# Patient Record
Sex: FEMALE | Race: WHITE | Employment: FULL TIME | URBAN - METROPOLITAN AREA
[De-identification: names, ages, dates, MRNs, and addresses within clinical notes are randomized per-mention and may not be internally consistent; named-entity substitution may affect disease eponyms.]

---

## 2017-03-01 ENCOUNTER — OFFICE VISIT (OUTPATIENT)
Dept: INTERNAL MEDICINE | Facility: CLINIC | Age: 62
End: 2017-03-01
Payer: COMMERCIAL

## 2017-03-01 VITALS
TEMPERATURE: 97.6 F | WEIGHT: 185 LBS | DIASTOLIC BLOOD PRESSURE: 62 MMHG | OXYGEN SATURATION: 94 % | HEART RATE: 68 BPM | RESPIRATION RATE: 12 BRPM | BODY MASS INDEX: 37.29 KG/M2 | SYSTOLIC BLOOD PRESSURE: 108 MMHG | HEIGHT: 59 IN

## 2017-03-01 DIAGNOSIS — E03.9 HYPOTHYROIDISM, UNSPECIFIED TYPE: ICD-10-CM

## 2017-03-01 DIAGNOSIS — E66.9 DIABETES MELLITUS TYPE 2 IN OBESE: Primary | ICD-10-CM

## 2017-03-01 DIAGNOSIS — E11.69 DIABETES MELLITUS TYPE 2 IN OBESE: Primary | ICD-10-CM

## 2017-03-01 DIAGNOSIS — Z12.11 SPECIAL SCREENING FOR MALIGNANT NEOPLASMS, COLON: ICD-10-CM

## 2017-03-01 DIAGNOSIS — Z13.6 CARDIOVASCULAR SCREENING; LDL GOAL LESS THAN 70: ICD-10-CM

## 2017-03-01 DIAGNOSIS — M54.9 UPPER BACK PAIN: ICD-10-CM

## 2017-03-01 DIAGNOSIS — D50.9 IRON DEFICIENCY ANEMIA, UNSPECIFIED IRON DEFICIENCY ANEMIA TYPE: ICD-10-CM

## 2017-03-01 DIAGNOSIS — Z11.59 NEED FOR HEPATITIS C SCREENING TEST: ICD-10-CM

## 2017-03-01 LAB
ALBUMIN SERPL-MCNC: 3.4 G/DL (ref 3.4–5)
ALP SERPL-CCNC: 95 U/L (ref 40–150)
ALT SERPL W P-5'-P-CCNC: 29 U/L (ref 0–50)
ANION GAP SERPL CALCULATED.3IONS-SCNC: 9 MMOL/L (ref 3–14)
AST SERPL W P-5'-P-CCNC: 24 U/L (ref 0–45)
BASOPHILS # BLD AUTO: 0 10E9/L (ref 0–0.2)
BASOPHILS NFR BLD AUTO: 0.2 %
BILIRUB SERPL-MCNC: 0.3 MG/DL (ref 0.2–1.3)
BUN SERPL-MCNC: 15 MG/DL (ref 7–30)
CALCIUM SERPL-MCNC: 8.5 MG/DL (ref 8.5–10.1)
CHLORIDE SERPL-SCNC: 106 MMOL/L (ref 94–109)
CHOLEST SERPL-MCNC: 165 MG/DL
CO2 SERPL-SCNC: 27 MMOL/L (ref 20–32)
CREAT SERPL-MCNC: 0.45 MG/DL (ref 0.52–1.04)
CREAT UR-MCNC: 93 MG/DL
DIFFERENTIAL METHOD BLD: ABNORMAL
EOSINOPHIL # BLD AUTO: 0.2 10E9/L (ref 0–0.7)
EOSINOPHIL NFR BLD AUTO: 2.6 %
ERYTHROCYTE [DISTWIDTH] IN BLOOD BY AUTOMATED COUNT: 15.2 % (ref 10–15)
FERRITIN SERPL-MCNC: 14 NG/ML (ref 8–252)
GFR SERPL CREATININE-BSD FRML MDRD: ABNORMAL ML/MIN/1.7M2
GLUCOSE SERPL-MCNC: 112 MG/DL (ref 70–99)
HBA1C MFR BLD: 5.9 % (ref 4.3–6)
HCT VFR BLD AUTO: 37.1 % (ref 35–47)
HCV AB SERPL QL IA: NORMAL
HDLC SERPL-MCNC: 42 MG/DL
HGB BLD-MCNC: 11.6 G/DL (ref 11.7–15.7)
IRON SATN MFR SERPL: 11 % (ref 15–46)
IRON SERPL-MCNC: 42 UG/DL (ref 35–180)
LDLC SERPL CALC-MCNC: 72 MG/DL
LYMPHOCYTES # BLD AUTO: 2.2 10E9/L (ref 0.8–5.3)
LYMPHOCYTES NFR BLD AUTO: 25.6 %
MCH RBC QN AUTO: 27.2 PG (ref 26.5–33)
MCHC RBC AUTO-ENTMCNC: 31.3 G/DL (ref 31.5–36.5)
MCV RBC AUTO: 87 FL (ref 78–100)
MICROALBUMIN UR-MCNC: 7 MG/L
MICROALBUMIN/CREAT UR: 7.32 MG/G CR (ref 0–25)
MONOCYTES # BLD AUTO: 0.7 10E9/L (ref 0–1.3)
MONOCYTES NFR BLD AUTO: 7.8 %
NEUTROPHILS # BLD AUTO: 5.5 10E9/L (ref 1.6–8.3)
NEUTROPHILS NFR BLD AUTO: 63.8 %
NONHDLC SERPL-MCNC: 123 MG/DL
PLATELET # BLD AUTO: 228 10E9/L (ref 150–450)
POTASSIUM SERPL-SCNC: 4 MMOL/L (ref 3.4–5.3)
PROT SERPL-MCNC: 7.7 G/DL (ref 6.8–8.8)
RBC # BLD AUTO: 4.27 10E12/L (ref 3.8–5.2)
SODIUM SERPL-SCNC: 142 MMOL/L (ref 133–144)
T4 FREE SERPL-MCNC: 1.27 NG/DL (ref 0.76–1.46)
TIBC SERPL-MCNC: 392 UG/DL (ref 240–430)
TRIGL SERPL-MCNC: 255 MG/DL
TSH SERPL DL<=0.005 MIU/L-ACNC: 7.59 MU/L (ref 0.4–4)
WBC # BLD AUTO: 8.6 10E9/L (ref 4–11)

## 2017-03-01 PROCEDURE — 86803 HEPATITIS C AB TEST: CPT | Performed by: NURSE PRACTITIONER

## 2017-03-01 PROCEDURE — 83540 ASSAY OF IRON: CPT | Performed by: NURSE PRACTITIONER

## 2017-03-01 PROCEDURE — 83036 HEMOGLOBIN GLYCOSYLATED A1C: CPT | Performed by: NURSE PRACTITIONER

## 2017-03-01 PROCEDURE — 83550 IRON BINDING TEST: CPT | Performed by: NURSE PRACTITIONER

## 2017-03-01 PROCEDURE — 80061 LIPID PANEL: CPT | Performed by: NURSE PRACTITIONER

## 2017-03-01 PROCEDURE — 80050 GENERAL HEALTH PANEL: CPT | Performed by: NURSE PRACTITIONER

## 2017-03-01 PROCEDURE — 99214 OFFICE O/P EST MOD 30 MIN: CPT | Performed by: NURSE PRACTITIONER

## 2017-03-01 PROCEDURE — 36415 COLL VENOUS BLD VENIPUNCTURE: CPT | Performed by: NURSE PRACTITIONER

## 2017-03-01 PROCEDURE — 82043 UR ALBUMIN QUANTITATIVE: CPT | Performed by: NURSE PRACTITIONER

## 2017-03-01 PROCEDURE — 82728 ASSAY OF FERRITIN: CPT | Performed by: NURSE PRACTITIONER

## 2017-03-01 PROCEDURE — 84439 ASSAY OF FREE THYROXINE: CPT | Performed by: NURSE PRACTITIONER

## 2017-03-01 RX ORDER — LEVOTHYROXINE SODIUM 100 UG/1
100 TABLET ORAL DAILY
Qty: 90 TABLET | Refills: 1 | Status: SHIPPED | OUTPATIENT
Start: 2017-03-01 | End: 2017-06-06

## 2017-03-01 RX ORDER — ATORVASTATIN CALCIUM 20 MG/1
20 TABLET, FILM COATED ORAL DAILY
Qty: 90 TABLET | Refills: 3 | Status: SHIPPED | OUTPATIENT
Start: 2017-03-01 | End: 2017-04-19

## 2017-03-01 RX ORDER — METFORMIN HCL 500 MG
500 TABLET, EXTENDED RELEASE 24 HR ORAL
Qty: 90 TABLET | Refills: 1 | Status: SHIPPED | OUTPATIENT
Start: 2017-03-01 | End: 2017-04-19

## 2017-03-01 RX ORDER — FERROUS SULFATE 325(65) MG
325 TABLET ORAL
Qty: 30 TABLET | Refills: 6 | Status: SHIPPED | OUTPATIENT
Start: 2017-03-01 | End: 2018-06-06

## 2017-03-01 RX ORDER — LISINOPRIL 2.5 MG/1
2.5 TABLET ORAL DAILY
Qty: 90 TABLET | Refills: 3 | Status: SHIPPED | OUTPATIENT
Start: 2017-03-01 | End: 2017-04-19

## 2017-03-01 RX ORDER — LEVOTHYROXINE SODIUM 88 UG/1
88 TABLET ORAL DAILY
Qty: 90 TABLET | Refills: 1 | Status: SHIPPED | OUTPATIENT
Start: 2017-03-01 | End: 2017-03-01

## 2017-03-01 NOTE — MR AVS SNAPSHOT
After Visit Summary   3/1/2017    Jennifer Fallon    MRN: [id number]           Patient Information     Date Of Birth          1955        Visit Information        Provider Department      3/1/2017 7:00 AM Kesha Rodas APRN CNP; [employer] [facility]        Today's Diagnoses     Diabetes mellitus type 2 in obese (H)    -  1    Hypothyroidism, unspecified type        CARDIOVASCULAR SCREENING; LDL GOAL LESS THAN 70        Iron deficiency anemia, unspecified iron deficiency anemia type        Upper back pain          Care Instructions    Labs in [address]    Refill on medication      May use WHEY powder to add to shakes       For back pain     Apply ice for 15-20 minutes intermittently as needed and especially after any offending activity; a bag of frozen vegetables works well for a cold pack.    May use heat alternating with cold if that helps it feel better    Warm moist heat, such as a shower or bath, may help relax muscles    Ibuprofen 400-600 mg up to every 8 hours  Take with food. Maximum dose 2400 mg a day.     Daily stretching as tolerated    As pain recedes, begin normal activities slowly as tolerated.      Consider Physical Therapy if symptoms not better with symptomatic care.    Follow up with worsening symptoms, failure to improve, or with any questions or concerns.               Follow-ups after your visit        Who to contact     If you have questions or need follow up information about today's clinic visit or your schedule please contact [facility] directly at [phone].  Normal or non-critical lab and imaging results will be communicated to you by MyChart, letter or phone within 4 business days after the clinic has received the results. If you do not hear from us within 7 days, please contact the clinic through MyChart or phone. If you have a critical or abnormal lab result, we will notify you by phone as soon as  "possible.  Submit refill requests through [employer] or call your pharmacy and they will forward the refill request to us. Please allow 3 business days for your refill to be completed.          Additional Information About Your Visit        [employer] Information     [employer] lets you send messages to your doctor, view your test results, renew your prescriptions, schedule appointments and more. To sign up, go to [email or web address] . Click on \"Log in\" on the left side of the screen, which will take you to the Welcome page. Then click on \"Sign up Now\" on the right side of the page.     You will be asked to enter the access code listed below, as well as some personal information. Please follow the directions to create your username and password.     Your access code is: [email or web address]  Expires: 2017  7:49 AM     Your access code will  in 90 days. If you need help or a new code, please call your [facility] clinic or [phone].        Care EveryWhere ID     This is your Care EveryWhere ID. This could be used by other organizations to access your [facility] medical records  [id number]        Your Vitals Were     Pulse Temperature Respirations Height Pulse Oximetry BMI (Body Mass Index)    68 97.6  F (36.4  C) (Oral) 12 4' 11\" (1.499 m) 94% 37.37 kg/m2       Blood Pressure from Last 3 Encounters:   17 108/62   16 96/62   16 102/70    Weight from Last 3 Encounters:   17 185 lb (83.9 kg)   16 179 lb 3.2 oz (81.3 kg)   16 197 lb (89.4 kg)              We Performed the Following     CBC with platelets differential     Comprehensive metabolic panel     Ferritin     Hemoglobin A1c     Iron and iron binding capacity     Lipid panel reflex to direct LDL     TSH with free T4 reflex          Where to get your medicines      These medications were sent to [facility] [address] [place], [place] - [address] AT [facility] [address]  [address], [facility] " [id number]     Phone:  [phone]     atorvastatin 20 MG tablet    blood glucose monitoring test strip    levothyroxine 88 MCG tablet    lisinopril 2.5 MG tablet    metFORMIN 500 MG 24 hr tablet    order for DME          Primary Care Provider Office Phone # Fax #    ARINA Dee [phone]       [facility] [address] [facility] [address]        Thank you!     Thank you for choosing [facility]  for your care. Our goal is always to provide you with excellent care. Hearing back from our patients is one way we can continue to improve our services. Please take a few minutes to complete the written survey that you may receive in the mail after your visit with us. Thank you!             Your Updated Medication List - Protect others around you: Learn how to safely use, store and throw away your medicines at www.disposemymeds.org.          This list is accurate as of: 3/1/17  7:49 AM.  Always use your most recent med list.                   Brand Name Dispense Instructions for use    atorvastatin 20 MG tablet    LIPITOR    90 tablet    Take 1 tablet (20 mg) by mouth daily       blood glucose monitoring lancets     1 Box    Use to test blood sugar 1 times daily or as directed.       blood glucose monitoring meter device kit     1 kit    Use to test blood sugars 1 times daily or as directed.       blood glucose monitoring test strip    ACCU-CHEK ARGENIS    3 Box    Use to test blood sugars 1 times daily or as directed.       Calcium + D3 600-200 MG-UNIT Tabs      Take by mouth 2 times daily       ferrous sulfate 325 (65 FE) MG tablet    IRON    30 tablet    Take 1 tablet (325 mg) by mouth daily (with breakfast)       levothyroxine 88 MCG tablet    SYNTHROID/LEVOTHROID    90 tablet    Take 1 tablet (88 mcg) by mouth daily       lisinopril 2.5 MG tablet    PRINIVIL/Zestril    90 tablet    Take 1 tablet (2.5 mg) by mouth daily       metFORMIN 500 MG 24 hr tablet     GLUCOPHAGE-XR    90 tablet    Take 1 tablet (500 mg) by mouth daily (with dinner)       order for DME     3 Month    Equipment being ordered: All DM testing supplies including test strips, lancets, for testing 2 times per day lambert mott

## 2017-03-01 NOTE — PATIENT INSTRUCTIONS
Labs in [address]    Refill on medication      May use WHEY powder to add to shakes       For back pain     Apply ice for 15-20 minutes intermittently as needed and especially after any offending activity; a bag of frozen vegetables works well for a cold pack.    May use heat alternating with cold if that helps it feel better    Warm moist heat, such as a shower or bath, may help relax muscles    Ibuprofen 400-600 mg up to every 8 hours  Take with food. Maximum dose 2400 mg a day.     Daily stretching as tolerated    As pain recedes, begin normal activities slowly as tolerated.      Consider Physical Therapy if symptoms not better with symptomatic care.    Follow up with worsening symptoms, failure to improve, or with any questions or concerns.

## 2017-03-01 NOTE — NURSING NOTE
"Chief Complaint   Patient presents with     Diabetes       Initial /62 (BP Location: Left arm, Patient Position: Chair, Cuff Size: Adult Large)  Pulse 68  Temp 97.6  F (36.4  C) (Oral)  Resp 12  Ht 4' 11\" (1.499 m)  Wt 185 lb (83.9 kg)  SpO2 94%  BMI 37.37 kg/m2 Estimated body mass index is 37.37 kg/(m^2) as calculated from the following:    Height as of this encounter: 4' 11\" (1.499 m).    Weight as of this encounter: 185 lb (83.9 kg).  Medication Reconciliation: complete     MILES Solano      "

## 2017-03-01 NOTE — PROGRESS NOTES
SUBJECTIVE:                                                    Jennifer Fallon is a 61 year old female who presents to clinic today for the following health issues:      Diabetes Follow-up    Patient is checking blood sugars: 1 or 2 times daily.    Blood sugar testing frequency justification: 1 time per day   Results are as follows:         am -     Diabetic concerns: None     Symptoms of hypoglycemia (low blood sugar): none     Paresthesias (numbness or burning in feet) or sores: No     Date of last diabetic eye exam: 2 years ago     Hypertension Follow-up      Outpatient blood pressures are not being checked.    Low Salt Diet: no added salt       Amount of exercise or physical activity: Active at work    Problems taking medications regularly: No    Medication side effects: none  Diet: low salt and diabetic      Upper back pain   Cleans office        Problem list and histories reviewed & adjusted, as indicated.  Additional history: as documented    Patient Active Problem List   Diagnosis     Advanced directives, counseling/discussion     CARDIOVASCULAR SCREENING; LDL GOAL LESS THAN 70     Postmenopausal status     Bilateral ankle pain     Dermatophytosis of nail     Diabetes mellitus type 2 in obese (H)     Hypothyroidism, unspecified type     Iron deficiency anemia, unspecified iron deficiency anemia type     Past Surgical History   Procedure Laterality Date     Tubal ligation  1987     Gallbladder surgery  2004       Social History   Substance Use Topics     Smoking status: Never Smoker     Smokeless tobacco: Never Used     Alcohol use 0.0 oz/week     0 Standard drinks or equivalent per week      Comment: Socially     Family History   Problem Relation Age of Onset     DIABETES Brother      DIABETES Brother      Coronary Artery Disease Mother      Breast Cancer No family hx of      Colon Cancer No family hx of      Prostate Cancer No family hx of      Other Cancer No family hx of            Reviewed and  "updated as needed this visit by clinical staff  Tobacco  Allergies  Meds  Soc Hx      Reviewed and updated as needed this visit by Provider  Allergies         ROS:  Constitutional, HEENT, cardiovascular, pulmonary, GI, , musculoskeletal, neuro, skin, endocrine and psych systems are negative, except as otherwise noted.    OBJECTIVE:                                                    /62 (BP Location: Left arm, Patient Position: Chair, Cuff Size: Adult Large)  Pulse 68  Temp 97.6  F (36.4  C) (Oral)  Resp 12  Ht 4' 11\" (1.499 m)  Wt 185 lb (83.9 kg)  SpO2 94%  BMI 37.37 kg/m2  Body mass index is 37.37 kg/(m^2).  GENERAL:  alert and no distress; seen with   RESP: lungs clear to auscultation - no rales, rhonchi or wheezes  CV: regular rate and rhythm, normal S1 S2, no S3 or S4, no murmur, click or rub, no peripheral edema   ABDOMEN: soft, nontender, no hepatosplenomegaly, no masses and bowel sounds normal  MS: no gross musculoskeletal defects noted, no edema  Has some tight trapezius muscle bilaterally   NEURO: Normal strength and tone, mentation intact and speech normal  PSYCH: mentation appears normal, affect normal/bright    Diagnostic Test Results:  Labs      ASSESSMENT/PLAN:                                                              ICD-10-CM    1. Diabetes mellitus type 2 in obese (H) E11.9 metFORMIN (GLUCOPHAGE-XR) 500 MG 24 hr tablet    E66.9 blood glucose monitoring (ACCU-CHEK ARGENIS) test strip     order for DME     lisinopril (PRINIVIL/ZESTRIL) 2.5 MG tablet     Lipid panel reflex to direct LDL     Hemoglobin A1c     TSH with free T4 reflex     CBC with platelets differential     Comprehensive metabolic panel     Albumin Random Urine Quantitative   2. Hypothyroidism, unspecified type E03.9 levothyroxine (SYNTHROID/LEVOTHROID) 88 MCG tablet     TSH with free T4 reflex   3. CARDIOVASCULAR SCREENING; LDL GOAL LESS THAN 70 Z13.6 atorvastatin (LIPITOR) 20 MG tablet     Lipid panel reflex to " direct LDL     Comprehensive metabolic panel   4. Iron deficiency anemia, unspecified iron deficiency anemia type D50.9 CBC with platelets differential     Iron and iron binding capacity     Ferritin   5. Upper back pain M54.9    6. Need for hepatitis C screening test Z11.59 **Hepatitis C Screen Reflex to RNA FUTURE anytime   7. Special screening for malignant neoplasms, colon Z12.11 Fecal colorectal cancer screen FIT       Patient Instructions   Labs in [address]    Refill on medication      May use WHEY powder to add to shakes       For back pain     Apply ice for 15-20 minutes intermittently as needed and especially after any offending activity; a bag of frozen vegetables works well for a cold pack.    May use heat alternating with cold if that helps it feel better    Warm moist heat, such as a shower or bath, may help relax muscles    Ibuprofen 400-600 mg up to every 8 hours  Take with food. Maximum dose 2400 mg a day.     Daily stretching as tolerated    As pain recedes, begin normal activities slowly as tolerated.      Consider Physical Therapy if symptoms not better with symptomatic care.    Follow up with worsening symptoms, failure to improve, or with any questions or concerns.             ARINA Dee [facility]

## 2017-03-01 NOTE — LETTER
[facility]  [address], [address]  [place] [address]  [phone]        March 16, 2017    Jennifer Fallon  [address]   [facility] [address]            Dear Ms. Jennifer Fallon:    Iron still a little low   Would continue the iron once daily with meals   Recheck labs in 4 months     Thyroid off   Will increase to 100 mcg daily   Recheck labs in 4 months       HGB A1C excellent!!    Sincerely,        [facility]

## 2017-03-02 DIAGNOSIS — Z12.11 SPECIAL SCREENING FOR MALIGNANT NEOPLASMS, COLON: ICD-10-CM

## 2017-03-02 PROCEDURE — 82274 ASSAY TEST FOR BLOOD FECAL: CPT | Performed by: NURSE PRACTITIONER

## 2017-03-03 LAB — HEMOCCULT STL QL IA: NEGATIVE

## 2017-04-19 ENCOUNTER — OFFICE VISIT (OUTPATIENT)
Dept: INTERNAL MEDICINE | Facility: CLINIC | Age: 62
End: 2017-04-19
Payer: COMMERCIAL

## 2017-04-19 VITALS
DIASTOLIC BLOOD PRESSURE: 70 MMHG | BODY MASS INDEX: 37.09 KG/M2 | RESPIRATION RATE: 12 BRPM | SYSTOLIC BLOOD PRESSURE: 124 MMHG | HEIGHT: 59 IN | WEIGHT: 184 LBS | OXYGEN SATURATION: 97 % | TEMPERATURE: 97.6 F | HEART RATE: 60 BPM

## 2017-04-19 DIAGNOSIS — E03.9 HYPOTHYROIDISM, UNSPECIFIED TYPE: ICD-10-CM

## 2017-04-19 DIAGNOSIS — R07.9 LEFT SIDED CHEST PAIN: ICD-10-CM

## 2017-04-19 DIAGNOSIS — E11.69 DIABETES MELLITUS TYPE 2 IN OBESE: ICD-10-CM

## 2017-04-19 DIAGNOSIS — Z13.6 CARDIOVASCULAR SCREENING; LDL GOAL LESS THAN 70: ICD-10-CM

## 2017-04-19 DIAGNOSIS — M54.9 UPPER BACK PAIN: Primary | ICD-10-CM

## 2017-04-19 DIAGNOSIS — D50.9 IRON DEFICIENCY ANEMIA, UNSPECIFIED IRON DEFICIENCY ANEMIA TYPE: ICD-10-CM

## 2017-04-19 DIAGNOSIS — E66.9 DIABETES MELLITUS TYPE 2 IN OBESE: ICD-10-CM

## 2017-04-19 PROCEDURE — 93000 ELECTROCARDIOGRAM COMPLETE: CPT | Performed by: NURSE PRACTITIONER

## 2017-04-19 PROCEDURE — 84439 ASSAY OF FREE THYROXINE: CPT | Performed by: NURSE PRACTITIONER

## 2017-04-19 PROCEDURE — 84443 ASSAY THYROID STIM HORMONE: CPT | Performed by: NURSE PRACTITIONER

## 2017-04-19 PROCEDURE — 36415 COLL VENOUS BLD VENIPUNCTURE: CPT | Performed by: NURSE PRACTITIONER

## 2017-04-19 PROCEDURE — 99215 OFFICE O/P EST HI 40 MIN: CPT | Performed by: NURSE PRACTITIONER

## 2017-04-19 RX ORDER — CYCLOBENZAPRINE HCL 10 MG
5-10 TABLET ORAL 3 TIMES DAILY PRN
Qty: 30 TABLET | Refills: 1 | Status: SHIPPED | OUTPATIENT
Start: 2017-04-19 | End: 2017-12-06

## 2017-04-19 RX ORDER — IBUPROFEN 600 MG/1
600 TABLET, FILM COATED ORAL 2 TIMES DAILY PRN
Qty: 60 TABLET | Refills: 1 | Status: SHIPPED | OUTPATIENT
Start: 2017-04-19 | End: 2021-06-01

## 2017-04-19 NOTE — NURSING NOTE
"Chief Complaint   Patient presents with     Pain     Bilateral shoulder and arm pain x over 2 weeks. Has been seen for this in the past. Also, pt c/o pain under left breast last week lasting only about 20 minutes. Still a feeling of soreness in the same area but not sharp pain like at first.      Medication Problem     Pt has not been taking any of her meds since January with the exception of her Thyroid med. Blood sugars have been pretety good. 99 yesterday.       Initial /70 (BP Location: Left arm, Patient Position: Chair, Cuff Size: Adult Large)  Pulse 60  Temp 97.6  F (36.4  C) (Oral)  Resp 12  Ht 4' 11\" (1.499 m)  Wt 184 lb (83.5 kg)  SpO2 97%  BMI 37.16 kg/m2 Estimated body mass index is 37.16 kg/(m^2) as calculated from the following:    Height as of this encounter: 4' 11\" (1.499 m).    Weight as of this encounter: 184 lb (83.5 kg).  Medication Reconciliation: complete     MILES Solano      "

## 2017-04-19 NOTE — PATIENT INSTRUCTIONS
Labs in [address]- thyroid     Labs fasting on 3 months time        May use WHEY powder to add to shakes       Rest the affected painful area as much as possible.     Apply ice for 15-20 minutes intermittently as needed and especially after any offending activity; a bag of frozen vegetables works well for a cold pack.    May use heat alternating with cold if that helps it feel better    Warm moist heat, such as a shower or bath, may help relax muscles    Ibuprofen 600 mg twice daily for 7-10 days. Take with food. Maximum dose 2400 mg a day. Then as needed for pain    Flexeril 1/2 -1 tab at bedtime for muscle relaxer      Daily stretching as tolerated  If not improving will do physical therapy     Follow up with worsening symptoms, failure to improve, or with any questions or concerns.

## 2017-04-19 NOTE — PROGRESS NOTES
SUBJECTIVE:                                                    Jennifer Fallon is a 61 year old female who presents to clinic today for the following health issues:    Chief Complaint   Patient presents with     Pain     Bilateral shoulder and arm pain x over 2 weeks. Has been seen for this in the past. Also, pt c/o pain under left breast last week lasting only about 20 minutes. Still a feeling of soreness in the same area but not sharp pain like at first.     Seen in past and RICE recommended and if not improving would consider physical therapy    Upper back shoulder and down arm to elbow   Not taking ibuprofen   Discomfort always there      Medication Problem     Pt has not been taking any of her meds since January with the exception of her Thyroid med. Blood sugars have been pretty good. 99 yesterday.  Not taking metformin   Trying to eat better with diet - lynette fang   Took metformin and her glucose went up - took 4 days and stopped     Since January not taking any medication including the blood pressure medication                  Problem list and histories reviewed & adjusted, as indicated.  Additional history: as documented    Patient Active Problem List   Diagnosis     Advanced directives, counseling/discussion     CARDIOVASCULAR SCREENING; LDL GOAL LESS THAN 70     Postmenopausal status     Bilateral ankle pain     Dermatophytosis of nail     Diabetes mellitus type 2 in obese (H)     Hypothyroidism, unspecified type     Iron deficiency anemia, unspecified iron deficiency anemia type     Past Surgical History:   Procedure Laterality Date     GALLBLADDER SURGERY  2004     TUBAL LIGATION  1987       Social History   Substance Use Topics     Smoking status: Never Smoker     Smokeless tobacco: Never Used     Alcohol use 0.0 oz/week     0 Standard drinks or equivalent per week      Comment: Socially     Family History   Problem Relation Age of Onset     DIABETES Brother      DIABETES Brother      Coronary  "Artery Disease Mother      Breast Cancer No family hx of      Colon Cancer No family hx of      Prostate Cancer No family hx of      Other Cancer No family hx of            Reviewed and updated as needed this visit by clinical staff  Tobacco  Allergies  Meds  Soc Hx      Reviewed and updated as needed this visit by Provider         ROS:  Constitutional, HEENT, cardiovascular, pulmonary, GI, , musculoskeletal, neuro, skin, endocrine and psych systems are negative, except as otherwise noted.    OBJECTIVE:                                                    /70 (BP Location: Left arm, Patient Position: Chair, Cuff Size: Adult Large)  Pulse 60  Temp 97.6  F (36.4  C) (Oral)  Resp 12  Ht 4' 11\" (1.499 m)  Wt 184 lb (83.5 kg)  SpO2 97%  BMI 37.16 kg/m2  Body mass index is 37.16 kg/(m^2).  GENERAL: alert and no distress; seen with   HENT: ear canals and TM's normal, nose and mouth without ulcers or lesions  RESP: lungs clear to auscultation - no rales, rhonchi or wheezes  CV: regular rate and rhythm, normal S1 S2, no S3 or S4, no murmur, click or rub, no peripheral edema   ABDOMEN: soft, nontender, and bowel sounds normal  MS: no gross musculoskeletal defects noted, tight muscle at base of neck and upper shoulder   NEURO: Normal strength and tone, mentation intact and speech normal  PSYCH: mentation appears normal, affect normal/bright    Diagnostic Test Results:  Labs   EKG: NSR Bradycardia      ASSESSMENT/PLAN:                                                            1. Upper back pain  She did not do the suggested things from last visit ; will do now   - ibuprofen (ADVIL/MOTRIN) 600 MG tablet; Take 1 tablet (600 mg) by mouth 2 times daily as needed for moderate pain  Dispense: 60 tablet; Refill: 1  - cyclobenzaprine (FLEXERIL) 10 MG tablet; Take 0.5-1 tablets (5-10 mg) by mouth 3 times daily as needed for muscle spasms  Dispense: 30 tablet; Refill: 1    2. Hypothyroidism, unspecified " type  Needs recheck as dose increased last time   - TSH with free T4 reflex  - cyclobenzaprine (FLEXERIL) 10 MG tablet; Take 0.5-1 tablets (5-10 mg) by mouth 3 times daily as needed for muscle spasms  Dispense: 30 tablet; Refill: 1    3. Left sided chest pain  Pinpoint pain under left breast - sharp pain - went away without any issues - no nausea sweating, or other issues with this pain   - EKG 12-lead complete w/read - Clinics    4. CARDIOVASCULAR SCREENING; LDL GOAL LESS THAN 70  Will repeat labs in 2-3 months   She has not been taking medication since January     5. Diabetes mellitus type 2 in obese (H)  She has not taken medication since January   Her glucoses have been around 100  Wants to do diet   Will recheck labs in 2-3 months       Patient Instructions   Labs in [address]- thyroid     Labs fasting on 3 months time        May use WHEY powder to add to shakes       Rest the affected painful area as much as possible.     Apply ice for 15-20 minutes intermittently as needed and especially after any offending activity; a bag of frozen vegetables works well for a cold pack.    May use heat alternating with cold if that helps it feel better    Warm moist heat, such as a shower or bath, may help relax muscles    Ibuprofen 600 mg twice daily for 7-10 days. Take with food. Maximum dose 2400 mg a day. Then as needed for pain    Flexeril 1/2 -1 tab at bedtime for muscle relaxer      Daily stretching as tolerated  If not improving will do physical therapy     Follow up with worsening symptoms, failure to improve, or with any questions or concerns.             ARINA Dee [facility]

## 2017-04-19 NOTE — MR AVS SNAPSHOT
After Visit Summary   4/19/2017    Jennifer Fallon    MRN: [id number]           Patient Information     Date Of Birth          1955        Visit Information        Provider Department      4/19/2017 8:15 AM Wilfredo Caba Lori Marie, APRN CNP [facility]        Today's Diagnoses     Upper back pain    -  1    Hypothyroidism, unspecified type          Care Instructions    Labs in [address]- thyroid        May use WHEY powder to add to shakes       Rest the affected painful area as much as possible.     Apply ice for 15-20 minutes intermittently as needed and especially after any offending activity; a bag of frozen vegetables works well for a cold pack.    May use heat alternating with cold if that helps it feel better    Warm moist heat, such as a shower or bath, may help relax muscles    Ibuprofen 600 mg twice daily for 7-10 days. Take with food. Maximum dose 2400 mg a day. Then as needed for pain    Flexeril 1/2 -1 tab at bedtime for muscle relaxer      Daily stretching as tolerated  If not improving will do physical therapy     Follow up with worsening symptoms, failure to improve, or with any questions or concerns.               Follow-ups after your visit        Who to contact     If you have questions or need follow up information about today's clinic visit or your schedule please contact [facility] directly at [phone].  Normal or non-critical lab and imaging results will be communicated to you by MyChart, letter or phone within 4 business days after the clinic has received the results. If you do not hear from us within 7 days, please contact the clinic through MyChart or phone. If you have a critical or abnormal lab result, we will notify you by phone as soon as possible.  Submit refill requests through [employer] or call your pharmacy and they will forward the refill request to us. Please allow 3 business days for your refill to be  "completed.          Additional Information About Your Visit        [employer] Information     [employer] lets you send messages to your doctor, view your test results, renew your prescriptions, schedule appointments and more. To sign up, go to [email or web address] . Click on \"Log in\" on the left side of the screen, which will take you to the Welcome page. Then click on \"Sign up Now\" on the right side of the page.     You will be asked to enter the access code listed below, as well as some personal information. Please follow the directions to create your username and password.     Your access code is: [email or web address]  Expires: 2017  8:49 AM     Your access code will  in 90 days. If you need help or a new code, please call your [place] clinic or [phone].        Care EveryWhere ID     This is your Care EveryWhere ID. This could be used by other organizations to access your [place] medical records  [id number]        Your Vitals Were     Pulse Temperature Respirations Height Pulse Oximetry BMI (Body Mass Index)    60 97.6  F (36.4  C) (Oral) 12 4' 11\" (1.499 m) 97% 37.16 kg/m2       Blood Pressure from Last 3 Encounters:   17 124/70   17 108/62   16 96/62    Weight from Last 3 Encounters:   17 184 lb (83.5 kg)   17 185 lb (83.9 kg)   16 179 lb 3.2 oz (81.3 kg)              We Performed the Following     TSH with free T4 reflex          Today's Medication Changes          These changes are accurate as of: 17  9:07 AM.  If you have any questions, ask your nurse or doctor.               Start taking these medicines.        Dose/Directions    cyclobenzaprine 10 MG tablet   Commonly known as:  FLEXERIL   Used for:  Hypothyroidism, unspecified type   Started by:  Kesha Rodas APRN CNP        Dose:  5-10 mg   Take 0.5-1 tablets (5-10 mg) by mouth 3 times daily as needed for muscle spasms   Quantity:  30 tablet   Refills:  1       ibuprofen 600 MG tablet   Commonly " known as:  ADVIL/MOTRIN   Used for:  Upper back pain   Started by:  Kesha Rodas APRN CNP        Dose:  600 mg   Take 1 tablet (600 mg) by mouth 2 times daily as needed for moderate pain   Quantity:  60 tablet   Refills:  1         Stop taking these medicines if you haven't already. Please contact your care team if you have questions.     atorvastatin 20 MG tablet   Commonly known as:  LIPITOR   Stopped by:  Kesha Rodas APRN CNP           lisinopril 2.5 MG tablet   Commonly known as:  PRINIVIL/Zestril   Stopped by:  Kesha Rodas APRN CNP           metFORMIN 500 MG 24 hr tablet   Commonly known as:  GLUCOPHAGE-XR   Stopped by:  Kesha Rodas APRN CNP                Where to get your medicines      These medications were sent to [facility] [address] [place] [address] [address] [facility] [address]     Phone:  [phone]     cyclobenzaprine 10 MG tablet    ibuprofen 600 MG tablet                Primary Care Provider Office Phone # Fax #    ARINA Dee [phone]       [facility] [address] [facility] [address]        Thank you!     Thank you for choosing [facility]  for your care. Our goal is always to provide you with excellent care. Hearing back from our patients is one way we can continue to improve our services. Please take a few minutes to complete the written survey that you may receive in the mail after your visit with us. Thank you!             Your Updated Medication List - Protect others around you: Learn how to safely use, store and throw away your medicines at www.disposemymeds.org.          This list is accurate as of: 4/19/17  9:07 AM.  Always use your most recent med list.                   Brand Name Dispense Instructions for use    blood glucose monitoring lancets     1 Box    Use to test blood sugar 1 times daily or as  directed.       blood glucose monitoring meter device kit     1 kit    Use to test blood sugars 1 times daily or as directed.       blood glucose monitoring test strip    ACCU-CHEK ARGEINS    3 Box    Use to test blood sugars 1 times daily or as directed.       Calcium + D3 600-200 MG-UNIT Tabs      Take by mouth 2 times daily       cyclobenzaprine 10 MG tablet    FLEXERIL    30 tablet    Take 0.5-1 tablets (5-10 mg) by mouth 3 times daily as needed for muscle spasms       ferrous sulfate 325 (65 FE) MG tablet    IRON    30 tablet    Take 1 tablet (325 mg) by mouth daily (with breakfast)       ibuprofen 600 MG tablet    ADVIL/MOTRIN    60 tablet    Take 1 tablet (600 mg) by mouth 2 times daily as needed for moderate pain       levothyroxine 100 MCG tablet    SYNTHROID/LEVOTHROID    90 tablet    Take 1 tablet (100 mcg) by mouth daily       order for DME     3 Month    Equipment being ordered: All DM testing supplies including test strips, lancets, for testing 2 times per day [employer]

## 2017-04-20 DIAGNOSIS — E03.9 HYPOTHYROIDISM, UNSPECIFIED TYPE: Primary | ICD-10-CM

## 2017-04-20 LAB
T4 FREE SERPL-MCNC: 1.4 NG/DL (ref 0.76–1.46)
TSH SERPL DL<=0.005 MIU/L-ACNC: 4.26 MU/L (ref 0.4–4)

## 2017-04-20 RX ORDER — LEVOTHYROXINE SODIUM 112 UG/1
112 TABLET ORAL DAILY
Qty: 90 TABLET | Refills: 1 | Status: SHIPPED | OUTPATIENT
Start: 2017-04-20 | End: 2017-10-31

## 2017-06-12 DIAGNOSIS — E03.9 HYPOTHYROIDISM, UNSPECIFIED TYPE: ICD-10-CM

## 2017-06-12 DIAGNOSIS — Z13.6 CARDIOVASCULAR SCREENING; LDL GOAL LESS THAN 70: ICD-10-CM

## 2017-06-12 DIAGNOSIS — E11.69 DIABETES MELLITUS TYPE 2 IN OBESE: ICD-10-CM

## 2017-06-12 DIAGNOSIS — R07.9 LEFT SIDED CHEST PAIN: ICD-10-CM

## 2017-06-12 DIAGNOSIS — D50.9 IRON DEFICIENCY ANEMIA, UNSPECIFIED IRON DEFICIENCY ANEMIA TYPE: ICD-10-CM

## 2017-06-12 DIAGNOSIS — M54.9 UPPER BACK PAIN: ICD-10-CM

## 2017-06-12 DIAGNOSIS — E66.9 DIABETES MELLITUS TYPE 2 IN OBESE: ICD-10-CM

## 2017-06-12 LAB
ALBUMIN SERPL-MCNC: 3.1 G/DL (ref 3.4–5)
ALP SERPL-CCNC: 95 U/L (ref 40–150)
ALT SERPL W P-5'-P-CCNC: 25 U/L (ref 0–50)
ANION GAP SERPL CALCULATED.3IONS-SCNC: 7 MMOL/L (ref 3–14)
AST SERPL W P-5'-P-CCNC: 18 U/L (ref 0–45)
BASOPHILS # BLD AUTO: 0 10E9/L (ref 0–0.2)
BASOPHILS NFR BLD AUTO: 0.2 %
BILIRUB SERPL-MCNC: 0.4 MG/DL (ref 0.2–1.3)
BUN SERPL-MCNC: 17 MG/DL (ref 7–30)
CALCIUM SERPL-MCNC: 8 MG/DL (ref 8.5–10.1)
CHLORIDE SERPL-SCNC: 111 MMOL/L (ref 94–109)
CHOLEST SERPL-MCNC: 144 MG/DL
CO2 SERPL-SCNC: 25 MMOL/L (ref 20–32)
CREAT SERPL-MCNC: 0.44 MG/DL (ref 0.52–1.04)
CREAT UR-MCNC: 151 MG/DL
DEPRECATED CALCIDIOL+CALCIFEROL SERPL-MC: 19 UG/L (ref 20–75)
DIFFERENTIAL METHOD BLD: ABNORMAL
EOSINOPHIL # BLD AUTO: 0.2 10E9/L (ref 0–0.7)
EOSINOPHIL NFR BLD AUTO: 1.9 %
ERYTHROCYTE [DISTWIDTH] IN BLOOD BY AUTOMATED COUNT: 15.4 % (ref 10–15)
FERRITIN SERPL-MCNC: 22 NG/ML (ref 8–252)
GFR SERPL CREATININE-BSD FRML MDRD: ABNORMAL ML/MIN/1.7M2
GLUCOSE SERPL-MCNC: 117 MG/DL (ref 70–99)
HCT VFR BLD AUTO: 37.1 % (ref 35–47)
HDLC SERPL-MCNC: 32 MG/DL
HGB BLD-MCNC: 11.6 G/DL (ref 11.7–15.7)
IRON SATN MFR SERPL: 15 % (ref 15–46)
IRON SERPL-MCNC: 55 UG/DL (ref 35–180)
LDLC SERPL CALC-MCNC: 72 MG/DL
LYMPHOCYTES # BLD AUTO: 2.3 10E9/L (ref 0.8–5.3)
LYMPHOCYTES NFR BLD AUTO: 24.9 %
MCH RBC QN AUTO: 27.9 PG (ref 26.5–33)
MCHC RBC AUTO-ENTMCNC: 31.3 G/DL (ref 31.5–36.5)
MCV RBC AUTO: 89 FL (ref 78–100)
MICROALBUMIN UR-MCNC: 11 MG/L
MICROALBUMIN/CREAT UR: 7.28 MG/G CR (ref 0–25)
MONOCYTES # BLD AUTO: 0.6 10E9/L (ref 0–1.3)
MONOCYTES NFR BLD AUTO: 6.2 %
NEUTROPHILS # BLD AUTO: 6.2 10E9/L (ref 1.6–8.3)
NEUTROPHILS NFR BLD AUTO: 66.8 %
NONHDLC SERPL-MCNC: 112 MG/DL
PLATELET # BLD AUTO: 219 10E9/L (ref 150–450)
POTASSIUM SERPL-SCNC: 3.8 MMOL/L (ref 3.4–5.3)
PROT SERPL-MCNC: 7.1 G/DL (ref 6.8–8.8)
RBC # BLD AUTO: 4.16 10E12/L (ref 3.8–5.2)
SODIUM SERPL-SCNC: 143 MMOL/L (ref 133–144)
TIBC SERPL-MCNC: 368 UG/DL (ref 240–430)
TRIGL SERPL-MCNC: 199 MG/DL
TSH SERPL DL<=0.005 MIU/L-ACNC: 3.34 MU/L (ref 0.4–4)
WBC # BLD AUTO: 9.3 10E9/L (ref 4–11)

## 2017-06-12 PROCEDURE — 80050 GENERAL HEALTH PANEL: CPT | Performed by: NURSE PRACTITIONER

## 2017-06-12 PROCEDURE — 82043 UR ALBUMIN QUANTITATIVE: CPT | Performed by: NURSE PRACTITIONER

## 2017-06-12 PROCEDURE — 83550 IRON BINDING TEST: CPT | Performed by: NURSE PRACTITIONER

## 2017-06-12 PROCEDURE — 82306 VITAMIN D 25 HYDROXY: CPT | Performed by: NURSE PRACTITIONER

## 2017-06-12 PROCEDURE — 83540 ASSAY OF IRON: CPT | Performed by: NURSE PRACTITIONER

## 2017-06-12 PROCEDURE — 36415 COLL VENOUS BLD VENIPUNCTURE: CPT | Performed by: NURSE PRACTITIONER

## 2017-06-12 PROCEDURE — 82728 ASSAY OF FERRITIN: CPT | Performed by: NURSE PRACTITIONER

## 2017-06-12 PROCEDURE — 80061 LIPID PANEL: CPT | Performed by: NURSE PRACTITIONER

## 2017-06-13 PROBLEM — E55.9 VITAMIN D DEFICIENCY: Status: ACTIVE | Noted: 2017-06-13

## 2017-06-14 ENCOUNTER — OFFICE VISIT (OUTPATIENT)
Dept: INTERNAL MEDICINE | Facility: CLINIC | Age: 62
End: 2017-06-14
Payer: COMMERCIAL

## 2017-06-14 VITALS
TEMPERATURE: 98 F | DIASTOLIC BLOOD PRESSURE: 60 MMHG | RESPIRATION RATE: 12 BRPM | OXYGEN SATURATION: 96 % | HEART RATE: 60 BPM | HEIGHT: 59 IN | SYSTOLIC BLOOD PRESSURE: 110 MMHG | BODY MASS INDEX: 38.51 KG/M2 | WEIGHT: 191 LBS

## 2017-06-14 DIAGNOSIS — E55.9 VITAMIN D DEFICIENCY: ICD-10-CM

## 2017-06-14 DIAGNOSIS — Z13.6 CARDIOVASCULAR SCREENING; LDL GOAL LESS THAN 70: ICD-10-CM

## 2017-06-14 DIAGNOSIS — Z12.31 ENCOUNTER FOR SCREENING MAMMOGRAM FOR BREAST CANCER: Primary | ICD-10-CM

## 2017-06-14 DIAGNOSIS — D50.9 IRON DEFICIENCY ANEMIA, UNSPECIFIED IRON DEFICIENCY ANEMIA TYPE: ICD-10-CM

## 2017-06-14 DIAGNOSIS — E11.69 DIABETES MELLITUS TYPE 2 IN OBESE: ICD-10-CM

## 2017-06-14 DIAGNOSIS — E03.9 HYPOTHYROIDISM, UNSPECIFIED TYPE: ICD-10-CM

## 2017-06-14 DIAGNOSIS — E66.9 DIABETES MELLITUS TYPE 2 IN OBESE: ICD-10-CM

## 2017-06-14 PROCEDURE — 99214 OFFICE O/P EST MOD 30 MIN: CPT | Performed by: NURSE PRACTITIONER

## 2017-06-14 PROCEDURE — T1013 SIGN LANG/ORAL INTERPRETER: HCPCS | Mod: U3 | Performed by: NURSE PRACTITIONER

## 2017-06-14 NOTE — MR AVS SNAPSHOT
After Visit Summary   6/14/2017    Jennifer Fallon    MRN: [id number]           Patient Information     Date Of Birth          1955        Visit Information        Provider Department      6/14/2017 8:15 AM Wilfredo Caba Lori Marie, APRN [facility]        Today's Diagnoses     Encounter for screening mammogram for breast cancer    -  1    Diabetes mellitus type 2 in obese (H)          Care Instructions    Vitamin D once weekly for 8 week   Then 2000 units vitamin D a day  After 8 weeks completed     Calcium supplement once daily     Labs in 6 months     N: [facility] [place]  [phone]  [email or web address]          Follow-ups after your visit        Additional Services     OPHTHALMOLOGY ADULT REFERRAL       Your provider has referred you to: N: [facility] [place]  [phone]  [email or web address]    Please be aware that coverage of these services is subject to the terms and limitations of your health insurance plan.  Call member services at your health plan with any benefit or coverage questions.      Please bring the following with you to your appointment:    (1) Any X-Rays, CTs or MRIs which have been performed.  Contact the facility where they were done to arrange for  prior to your scheduled appointment.    (2) List of current medications  (3) This referral request   (4) Any documents/labs given to you for this referral                  Your next 10 appointments already scheduled     Jun 23, 2017   Procedure with Chang Beasley MD   [facility] ([facility])    [address] [place] [id number] [phone]           [facility] is located at [address] [place]              Future tests that were ordered for you today     Open Future Orders        Priority Expected Expires Ordered  "   *MA Screening Digital Bilateral Routine  2018            Who to contact     If you have questions or need follow up information about today's clinic visit or your schedule please contact [facility] directly at [phone].  Normal or non-critical lab and imaging results will be communicated to you by MyChart, letter or phone within 4 business days after the clinic has received the results. If you do not hear from us within 7 days, please contact the clinic through [employer] or phone. If you have a critical or abnormal lab result, we will notify you by phone as soon as possible.  Submit refill requests through [employer] or call your pharmacy and they will forward the refill request to us. Please allow 3 business days for your refill to be completed.          Additional Information About Your Visit        [employer] Information     [employer] lets you send messages to your doctor, view your test results, renew your prescriptions, schedule appointments and more. To sign up, go to [email or web address] . Click on \"Log in\" on the left side of the screen, which will take you to the Welcome page. Then click on \"Sign up Now\" on the right side of the page.     You will be asked to enter the access code listed below, as well as some personal information. Please follow the directions to create your username and password.     Your access code is: [email or web address]  Expires: 2017  9:12 AM     Your access code will  in 90 days. If you need help or a new code, please call your [facility] or [phone].        Care EveryWhere ID     This is your Care EveryWhere ID. This could be used by other organizations to access your [place] medical records  [id number]        Your Vitals Were     Pulse Temperature Respirations Height Pulse Oximetry BMI (Body Mass Index)    60 98  F (36.7  C) (Oral) 12 4' 11\" (1.499 m) 96% 38.58 kg/m2       Blood Pressure from Last 3 Encounters:   17 110/60 "   04/19/17 124/70   03/01/17 108/62    Weight from Last 3 Encounters:   06/14/17 191 lb (86.6 kg)   04/19/17 184 lb (83.5 kg)   03/01/17 185 lb (83.9 kg)              We Performed the Following     OPHTHALMOLOGY ADULT REFERRAL          Today's Medication Changes          These changes are accurate as of: 6/14/17  9:12 AM.  If you have any questions, ask your nurse or doctor.               Stop taking these medicines if you haven't already. Please contact your care team if you have questions.     vitamin D 05266 UNIT capsule   Commonly known as:  ERGOCALCIFEROL   Stopped by:  Kesha Rodas APRN CNP                    Primary Care Provider Office Phone # Fax #    ARINA Dee [phone]       [facility] [address] [address]        Thank you!     Thank you for choosing [facility]  for your care. Our goal is always to provide you with excellent care. Hearing back from our patients is one way we can continue to improve our services. Please take a few minutes to complete the written survey that you may receive in the mail after your visit with us. Thank you!             Your Updated Medication List - Protect others around you: Learn how to safely use, store and throw away your medicines at www.disposemymeds.org.          This list is accurate as of: 6/14/17  9:12 AM.  Always use your most recent med list.                   Brand Name Dispense Instructions for use    blood glucose monitoring lancets     1 Box    Use to test blood sugar 1 times daily or as directed.       blood glucose monitoring meter device kit     1 kit    Use to test blood sugars 1 times daily or as directed.       blood glucose monitoring test strip    ACCU-CHEK ARGENIS    3 Box    Use to test blood sugars 1 times daily or as directed.       cyclobenzaprine 10 MG tablet    FLEXERIL    30 tablet    Take 0.5-1 tablets (5-10 mg) by mouth 3 times daily as needed for muscle  spasms       ferrous sulfate 325 (65 FE) MG tablet    IRON    30 tablet    Take 1 tablet (325 mg) by mouth daily (with breakfast)       ibuprofen 600 MG tablet    ADVIL/MOTRIN    60 tablet    Take 1 tablet (600 mg) by mouth 2 times daily as needed for moderate pain       levothyroxine 112 MCG tablet    SYNTHROID/LEVOTHROID    90 tablet    Take 1 tablet (112 mcg) by mouth daily       order for DME     3 Month    Equipment being ordered: All DM testing supplies including test strips, lancets, for testing 2 times per day lambert mott       vitamin D 2000 UNITS tablet     100 tablet    Take 2,000 Units by mouth daily

## 2017-06-14 NOTE — NURSING NOTE
"Chief Complaint   Patient presents with     RECHECK     Lab results. Was to repeat iron and other labs.        Initial /60 (BP Location: Left arm, Patient Position: Chair, Cuff Size: Adult Large)  Pulse 60  Temp 98  F (36.7  C) (Oral)  Resp 12  Ht 4' 11\" (1.499 m)  Wt 191 lb (86.6 kg)  SpO2 96%  BMI 38.58 kg/m2 Estimated body mass index is 38.58 kg/(m^2) as calculated from the following:    Height as of this encounter: 4' 11\" (1.499 m).    Weight as of this encounter: 191 lb (86.6 kg).  Medication Reconciliation: complete     MILES Solano      "

## 2017-06-14 NOTE — PATIENT INSTRUCTIONS
Vitamin D once weekly for 8 week   Then 2000 units vitamin D a day  After 8 weeks completed     Calcium supplement once daily     Labs in 6 months     N: [facility] [place]  [phone]  [email or web address]

## 2017-06-14 NOTE — PROGRESS NOTES
SUBJECTIVE:                                                    Jennifer Fallon is a 62 year old female who presents to clinic today for the following health issues:  Chief Complaint   Patient presents with     RECHECK     Lab results. Was to repeat iron and other labs.   Reviewed lab and recommendations with patient through     Colonoscopy on the 23 rd            Problem list and histories reviewed & adjusted, as indicated.  Additional history: as documented    Patient Active Problem List   Diagnosis     Advanced directives, counseling/discussion     CARDIOVASCULAR SCREENING; LDL GOAL LESS THAN 70     Postmenopausal status     Bilateral ankle pain     Dermatophytosis of nail     Diabetes mellitus type 2 in obese (H)     Hypothyroidism, unspecified type     Iron deficiency anemia, unspecified iron deficiency anemia type     Vitamin D deficiency     Past Surgical History:   Procedure Laterality Date     CHOLECYSTECTOMY  2004     TUBAL LIGATION  1987       Social History   Substance Use Topics     Smoking status: Never Smoker     Smokeless tobacco: Never Used     Alcohol use 0.0 oz/week     0 Standard drinks or equivalent per week      Comment: Socially     Family History   Problem Relation Age of Onset     DIABETES Brother      DIABETES Brother      Coronary Artery Disease Mother      Breast Cancer No family hx of      Colon Cancer No family hx of      Prostate Cancer No family hx of      Other Cancer No family hx of            Reviewed and updated as needed this visit by clinical staff  Tobacco  Allergies  Meds  Soc Hx      Reviewed and updated as needed this visit by Provider         ROS:  Constitutional, HEENT, cardiovascular, pulmonary, GI, , musculoskeletal, neuro, skin, endocrine and psych systems are negative, except as otherwise noted.    OBJECTIVE:                                                    /60 (BP Location: Left arm, Patient Position: Chair, Cuff Size: Adult Large)  Pulse  "60  Temp 98  F (36.7  C) (Oral)  Resp 12  Ht 4' 11\" (1.499 m)  Wt 191 lb (86.6 kg)  SpO2 96%  BMI 38.58 kg/m2  Body mass index is 38.58 kg/(m^2).  GENERAL:  alert and no distress; seen with   RESP: lungs clear to auscultation - no rales, rhonchi or wheezes  CV: regular rate and rhythm  ABDOMEN: soft, nontender, and bowel sounds normal  MS: no gross musculoskeletal defects noted, no edema  NEURO: Normal strength and tone, mentation intact and speech normal  PSYCH: mentation appears normal, affect normal/bright    Diagnostic Test Results:  Reviewed labs      ASSESSMENT/PLAN:                                                      (Z12.31) Encounter for screening mammogram for breast cancer  (primary encounter diagnosis)  Comment:   Plan: *MA Screening Digital Bilateral            (E11.9,  E66.9) Diabetes mellitus type 2 in obese (H)  Comment: in good control with diet; will recheck labs in 6 months    Plan: OPHTHALMOLOGY ADULT REFERRAL            (Z13.6) CARDIOVASCULAR SCREENING; LDL GOAL LESS THAN 70  Comment: fasting labs in 6 months - does not want cholesterol medication and cholesterol numbres improved   Plan: as above     (E03.9) Hypothyroidism, unspecified type  Comment: in good range   Plan: no change in medication     (D50.9) Iron deficiency anemia, unspecified iron deficiency anemia type  Comment: would continue medication   Plan: no change in medication     (E55.9) Vitamin D deficiency  Comment: needs increase dose   Plan: reviewed with patient           Patient Instructions   Vitamin D once weekly for 8 week   Then 2000 units vitamin D a day  After 8 weeks completed     Calcium supplement once daily     Labs in 6 months     FHN: [facility] [place]  [phone]  [email or web address]      ARINA Dee [facility]    "

## 2017-06-16 ENCOUNTER — ALLIED HEALTH/NURSE VISIT (OUTPATIENT)
Dept: NURSING | Facility: CLINIC | Age: 62
End: 2017-06-16

## 2017-06-16 DIAGNOSIS — Z53.9 ERRONEOUS ENCOUNTER--DISREGARD: Primary | ICD-10-CM

## 2017-06-16 NOTE — MR AVS SNAPSHOT
After Visit Summary   6/16/2017    Jennifer Fallon    MRN: [id number]           Patient Information     Date Of Birth          1955        Visit Information        Provider Department      6/16/2017 11:15 AM RI IM NURSE [facility]        Today's Diagnoses     ERRONEOUS ENCOUNTER--DISREGARD    -  1      Care Instructions    Error            Follow-ups after your visit        Your next 10 appointments already scheduled     Jun 16, 2017 11:15 AM CDT   Nurse Only with RI IM NURSE   [facility] ([facility])    [address]  [facility] [id number] [phone]            Jun 19, 2017  8:15 AM CDT   MA SCREENING DIGITAL BILATERAL with RHBCMA2   [facility] ([facility])    [address], [address]  [facility] [id number] [phone]           Do not use any powder, lotion or deodorant under your arms or on your breast. If you do, we will ask you to remove it before your exam.  Wear comfortable, two-piece clothing.  If you have any allergies, tell your care team.  Bring any previous mammograms from other facilities or have them mailed to the breast center. This mammogram location, [facility], now offers 3D mammography. It doesn't replace a screening mammogram and can be done with a regular screening mammogram. It is optional and not all insurances will pay for it. 3D mammography is a special kind of mammogram that produces a three-dimensional image of the breast by using low dose-xrays. 3D allows the radiologist to see the breast tissue differently from 2D, which reduces the chance of repeat testing due to overlapping breast tissue. If you are interested in have a 3D mammogram, please check with your insurance before you arrive for your exam. On the day of your exam you will be asked if you would like 3D imaging.            Jun 23, 2017   Procedure with Chang eBasley MD    [facility] ([facility])    [address]  [facility] [address]   [phone]           [facility] is located at [address] [place]            Nov 30, 2017  8:00 AM CST   LAB with RI LAB   [facility] ([facility])    [address]  [facility] [id number] [phone]           Patient must bring picture ID.  Patient should be prepared to give a urine specimen  Please do not eat 10-12 hours before your appointment if you are coming in fasting for labs on lipids, cholesterol, or glucose (sugar).  Pregnant women should follow their Care Team instructions. Water with medications is okay. Do not drink coffee or other fluids.   If you have concerns about taking  your medications, please ask at office or if scheduling via Merc[employer], send a message by clicking on Secure Messaging, Message Your Care Team.            Dec 06, 2017  7:00 AM CST   SHORT with ARINA Dee CNP   [facility] ([facility])    [address]  [facility] [id number] [phone]              Who to contact     If you have questions or need follow up information about today's clinic visit or your schedule please contact [facility] directly at [phone].  Normal or non-critical lab and imaging results will be communicated to you by MyChart, letter or phone within 4 business days after the clinic has received the results. If you do not hear from us within 7 days, please contact the clinic through MyChart or phone. If you have a critical or abnormal lab result, we will notify you by phone as soon as possible.  Submit refill requests through [employer]aux or call your pharmacy and they will forward the refill request to us. Please allow 3 business days for your refill to be completed.          Additional Information About Your Visit        Mercaux Information     Mercaux lets  "you send messages to your doctor, view your test results, renew your prescriptions, schedule appointments and more. To sign up, go to [email or web address] . Click on \"Log in\" on the left side of the screen, which will take you to the Welcome page. Then click on \"Sign up Now\" on the right side of the page.     You will be asked to enter the access code listed below, as well as some personal information. Please follow the directions to create your username and password.     Your access code is: [email or web address]  Expires: 2017  9:12 AM     Your access code will  in 90 days. If you need help or a new code, please call your [place] clinic or [phone].        Care EveryWhere ID     This is your Care EveryWhere ID. This could be used by other organizations to access your [place] medical records  [id number]         Blood Pressure from Last 3 Encounters:   17 110/60   17 124/70   17 108/62    Weight from Last 3 Encounters:   17 191 lb (86.6 kg)   17 184 lb (83.5 kg)   17 185 lb (83.9 kg)              Today, you had the following     No orders found for display       Primary Care Provider Office Phone # Fax #    Kesha Coyle ARINA Rodas [facility] [phone]       [facility] [address] [place] [address]        Thank you!     Thank you for choosing [facility]  for your care. Our goal is always to provide you with excellent care. Hearing back from our patients is one way we can continue to improve our services. Please take a few minutes to complete the written survey that you may receive in the mail after your visit with us. Thank you!             Your Updated Medication List - Protect others around you: Learn how to safely use, store and throw away your medicines at www.disposemymeds.org.          This list is accurate as of: 17  9:47 AM.  Always use your most recent med list.                   Brand Name Dispense " Instructions for use    blood glucose monitoring lancets     1 Box    Use to test blood sugar 1 times daily or as directed.       blood glucose monitoring meter device kit     1 kit    Use to test blood sugars 1 times daily or as directed.       blood glucose monitoring test strip    ACCU-CHEK ARGENIS    3 Box    Use to test blood sugars 1 times daily or as directed.       cyclobenzaprine 10 MG tablet    FLEXERIL    30 tablet    Take 0.5-1 tablets (5-10 mg) by mouth 3 times daily as needed for muscle spasms       ferrous sulfate 325 (65 FE) MG tablet    IRON    30 tablet    Take 1 tablet (325 mg) by mouth daily (with breakfast)       ibuprofen 600 MG tablet    ADVIL/MOTRIN    60 tablet    Take 1 tablet (600 mg) by mouth 2 times daily as needed for moderate pain       levothyroxine 112 MCG tablet    SYNTHROID/LEVOTHROID    90 tablet    Take 1 tablet (112 mcg) by mouth daily       order for DME     3 Month    Equipment being ordered: All DM testing supplies including test strips, lancets, for testing 2 times per day accucheck argenis       vitamin D 2000 UNITS tablet     100 tablet    Take 2,000 Units by mouth daily

## 2017-06-19 ENCOUNTER — HOSPITAL ENCOUNTER (OUTPATIENT)
Dept: MAMMOGRAPHY | Facility: CLINIC | Age: 62
Discharge: HOME | End: 2017-06-19
Attending: NURSE PRACTITIONER | Admitting: NURSE PRACTITIONER
Payer: COMMERCIAL

## 2017-06-19 DIAGNOSIS — Z12.31 ENCOUNTER FOR SCREENING MAMMOGRAM FOR BREAST CANCER: ICD-10-CM

## 2017-06-19 PROCEDURE — G0202 SCR MAMMO BI INCL CAD: HCPCS

## 2017-06-19 PROCEDURE — T1013 SIGN LANG/ORAL INTERPRETER: HCPCS | Mod: U3

## 2017-06-23 ENCOUNTER — HOSPITAL ENCOUNTER (OUTPATIENT)
Facility: CLINIC | Age: 62
Discharge: HOME | End: 2017-06-23
Attending: INTERNAL MEDICINE | Admitting: INTERNAL MEDICINE
Payer: COMMERCIAL

## 2017-06-23 VITALS
HEART RATE: 58 BPM | RESPIRATION RATE: 12 BRPM | WEIGHT: 191 LBS | OXYGEN SATURATION: 94 % | HEIGHT: 59 IN | BODY MASS INDEX: 38.51 KG/M2 | DIASTOLIC BLOOD PRESSURE: 67 MMHG | SYSTOLIC BLOOD PRESSURE: 115 MMHG

## 2017-06-23 LAB
COLONOSCOPY: NORMAL
GLUCOSE BLDC GLUCOMTR-MCNC: 97 MG/DL (ref 70–99)

## 2017-06-23 PROCEDURE — 88305 TISSUE EXAM BY PATHOLOGIST: CPT | Performed by: INTERNAL MEDICINE

## 2017-06-23 PROCEDURE — 82962 GLUCOSE BLOOD TEST: CPT

## 2017-06-23 PROCEDURE — 25000128 H RX IP 250 OP 636: Performed by: INTERNAL MEDICINE

## 2017-06-23 PROCEDURE — G0500 MOD SEDAT ENDO SERVICE >5YRS: HCPCS | Performed by: INTERNAL MEDICINE

## 2017-06-23 PROCEDURE — 99152 MOD SED SAME PHYS/QHP 5/>YRS: CPT | Performed by: INTERNAL MEDICINE

## 2017-06-23 PROCEDURE — 45380 COLONOSCOPY AND BIOPSY: CPT | Performed by: INTERNAL MEDICINE

## 2017-06-23 PROCEDURE — 88305 TISSUE EXAM BY PATHOLOGIST: CPT | Mod: 26 | Performed by: INTERNAL MEDICINE

## 2017-06-23 RX ORDER — ONDANSETRON 2 MG/ML
4 INJECTION INTRAMUSCULAR; INTRAVENOUS
Status: DISCONTINUED | OUTPATIENT
Start: 2017-06-23 | End: 2017-06-23 | Stop reason: HOSPADM

## 2017-06-23 RX ORDER — FENTANYL CITRATE 50 UG/ML
INJECTION, SOLUTION INTRAMUSCULAR; INTRAVENOUS PRN
Status: DISCONTINUED | OUTPATIENT
Start: 2017-06-23 | End: 2017-06-23 | Stop reason: HOSPADM

## 2017-06-23 RX ORDER — ONDANSETRON 4 MG/1
4 TABLET, ORALLY DISINTEGRATING ORAL EVERY 6 HOURS PRN
Status: DISCONTINUED | OUTPATIENT
Start: 2017-06-23 | End: 2017-06-23 | Stop reason: HOSPADM

## 2017-06-23 RX ORDER — NALOXONE HYDROCHLORIDE 0.4 MG/ML
.1-.4 INJECTION, SOLUTION INTRAMUSCULAR; INTRAVENOUS; SUBCUTANEOUS
Status: DISCONTINUED | OUTPATIENT
Start: 2017-06-23 | End: 2017-06-23 | Stop reason: HOSPADM

## 2017-06-23 RX ORDER — LIDOCAINE 40 MG/G
CREAM TOPICAL
Status: DISCONTINUED | OUTPATIENT
Start: 2017-06-23 | End: 2017-06-23 | Stop reason: HOSPADM

## 2017-06-23 RX ORDER — FLUMAZENIL 0.1 MG/ML
0.2 INJECTION, SOLUTION INTRAVENOUS
Status: DISCONTINUED | OUTPATIENT
Start: 2017-06-23 | End: 2017-06-23 | Stop reason: HOSPADM

## 2017-06-23 RX ORDER — ONDANSETRON 2 MG/ML
4 INJECTION INTRAMUSCULAR; INTRAVENOUS EVERY 6 HOURS PRN
Status: DISCONTINUED | OUTPATIENT
Start: 2017-06-23 | End: 2017-06-23 | Stop reason: HOSPADM

## 2017-06-23 NOTE — IP AVS SNAPSHOT
[facility]    [address]    [facility] [address]    Phone:  [phone]    Fax:  [phone]                                       After Visit Summary   6/23/2017    Jennifer Fallon    MRN: [id number]           After Visit Summary Signature Page     I have received my discharge instructions, and my questions have been answered. I have discussed any challenges I see with this plan with the nurse or doctor.    ..........................................................................................................................................  Patient/Patient Representative Signature      ..........................................................................................................................................  Patient Representative Print Name and Relationship to Patient    ..................................................               ................................................  Date                                            Time    ..........................................................................................................................................  Reviewed by Signature/Title    ...................................................              ..............................................  Date                                                            Time

## 2017-06-23 NOTE — IP AVS SNAPSHOT
MRN:[id number]                      After Visit Summary   6/23/2017    Jennifer Fallon    MRN: [id number]           Thank you!     Thank you for choosing [facility] for your care. Our goal is always to provide you with excellent care. Hearing back from our patients is one way we can continue to improve our services. Please take a few minutes to complete the written survey that you may receive in the mail after you visit. If you would like to speak to someone directly about your visit please contact Patient Relations at [phone]. Thank you!          Patient Information     Date Of Birth          1955        About your hospital stay     You were admitted on:  June 23, 2017 You last received care in the:  [facility]    You were discharged on:  June 23, 2017       Who to Call     For medical emergencies, please call 911.  For non-urgent questions about your medical care, please call your primary care provider or clinic, [phone]  For questions related to your surgery, please call your surgery clinic        Attending Provider     Provider Specialty    Chang Beasley MD Gastroenterology       Primary Care Provider Office Phone # Fax #    Kesha SzymanskiARINA [facility] [phone]      Your next 10 appointments already scheduled     Nov 30, 2017  8:00 AM CST   LAB with RI LAB   [facility]    [address]  [facility] [address] [phone]           Patient must bring picture ID.  Patient should be prepared to give a urine specimen  Please do not eat 10-12 hours before your appointment if you are coming in fasting for labs on lipids, cholesterol, or glucose (sugar).  Pregnant women should follow their Care Team instructions. Water with medications is okay. Do not drink coffee or other fluids.   If you have concerns about taking  your medications, please ask at office or if  "scheduling via [employer], send a message by clicking on Secure Messaging, Message Your Care Team.            Dec 06, 2017  7:00 AM CST   SHORT with ARINA Dee CNP   [facility] ([facility])    [address]  [facility] [address]   [phone]              Pending Results     No orders found from 2017 to 2017.            Admission Information     Date & Time Provider Department Dept. Phone    2017 Chang Beasley MD [facility] [phone]      Your Vitals Were     Blood Pressure Pulse Respirations Height Weight Pulse Oximetry    111/46 58 15 1.499 m (4' 11\") 86.6 kg (191 lb) 98%    BMI (Body Mass Index)                   38.58 kg/m2           AirXpanders Information     [employer]ers lets you send messages to your doctor, view your test results, renew your prescriptions, schedule appointments and more. To sign up, go to [email or web address] . Click on \"Log in\" on the left side of the screen, which will take you to the Welcome page. Then click on \"Sign up Now\" on the right side of the page.     You will be asked to enter the access code listed below, as well as some personal information. Please follow the directions to create your username and password.     Your access code is: [email or web address]  Expires: 2017  9:12 AM     Your access code will  in 90 days. If you need help or a new code, please call your [facility] or [phone].        Care EveryWhere ID     This is your Care EveryWhere ID. This could be used by other organizations to access your [facility] medical records  [id number]        Equal Access to Services     MAME GARIBAY : Doreen Barbosa, waserenityda lujabier, qaybta kaalmada bobby, bonnie abdullahi. So [facility] [phone].    ATENCIÓN: Si habla español, tiene a vaughan disposición servicios gratuitos de asistencia lingüística. Llame al [phone].    We comply with " applicable federal civil rights laws and [place] laws. We do not discriminate on the basis of race, color, national origin, age, disability sex, sexual orientation or gender identity.               Review of your medicines      CONTINUE these medicines which have NOT CHANGED        Dose / Directions    blood glucose monitoring lancets   Used for:  Diabetes mellitus type 2 in obese (H)        Use to test blood sugar 1 times daily or as directed.   Quantity:  1 Box   Refills:  0       blood glucose monitoring meter device kit   Used for:  Diabetes mellitus type 2 in obese (H)        Use to test blood sugars 1 times daily or as directed.   Quantity:  1 kit   Refills:  0       blood glucose monitoring test strip   Commonly known as:  ACCU-CHEK ARGENIS   Used for:  Diabetes mellitus type 2 in obese (H)        Use to test blood sugars 1 times daily or as directed.   Quantity:  3 Box   Refills:  1       cyclobenzaprine 10 MG tablet   Commonly known as:  FLEXERIL   Used for:  Hypothyroidism, unspecified type        Dose:  5-10 mg   Take 0.5-1 tablets (5-10 mg) by mouth 3 times daily as needed for muscle spasms   Quantity:  30 tablet   Refills:  1       ferrous sulfate 325 (65 FE) MG tablet   Commonly known as:  IRON   Used for:  Iron deficiency anemia, unspecified iron deficiency anemia type        Dose:  325 mg   Take 1 tablet (325 mg) by mouth daily (with breakfast)   Quantity:  30 tablet   Refills:  6       ibuprofen 600 MG tablet   Commonly known as:  ADVIL/MOTRIN   Used for:  Upper back pain        Dose:  600 mg   Take 1 tablet (600 mg) by mouth 2 times daily as needed for moderate pain   Quantity:  60 tablet   Refills:  1       levothyroxine 112 MCG tablet   Commonly known as:  SYNTHROID/LEVOTHROID   Used for:  Hypothyroidism, unspecified type        Dose:  112 mcg   Take 1 tablet (112 mcg) by mouth daily   Quantity:  90 tablet   Refills:  1       order for DME   Used for:  Diabetes mellitus type 2 in obese (H)         Equipment being ordered: All DM testing supplies including test strips, lancets, for testing 2 times per day accucheck argenis   Quantity:  3 Month   Refills:  3       vitamin D 2000 UNITS tablet   Used for:  Vitamin D deficiency        Dose:  2000 Units   Take 2,000 Units by mouth daily   Quantity:  100 tablet   Refills:  3                Protect others around you: Learn how to safely use, store and throw away your medicines at www.disposemymeds.org.             Medication List: This is a list of all your medications and when to take them. Check marks below indicate your daily home schedule. Keep this list as a reference.      Medications           Morning Afternoon Evening Bedtime As Needed    blood glucose monitoring lancets   Use to test blood sugar 1 times daily or as directed.                                blood glucose monitoring meter device kit   Use to test blood sugars 1 times daily or as directed.                                blood glucose monitoring test strip   Commonly known as:  ACCU-CHEK ARGENIS   Use to test blood sugars 1 times daily or as directed.                                cyclobenzaprine 10 MG tablet   Commonly known as:  FLEXERIL   Take 0.5-1 tablets (5-10 mg) by mouth 3 times daily as needed for muscle spasms                                ferrous sulfate 325 (65 FE) MG tablet   Commonly known as:  IRON   Take 1 tablet (325 mg) by mouth daily (with breakfast)                                ibuprofen 600 MG tablet   Commonly known as:  ADVIL/MOTRIN   Take 1 tablet (600 mg) by mouth 2 times daily as needed for moderate pain                                levothyroxine 112 MCG tablet   Commonly known as:  SYNTHROID/LEVOTHROID   Take 1 tablet (112 mcg) by mouth daily                                order for DME   Equipment being ordered: All DM testing supplies including test strips, lancets, for testing 2 times per day accucheck argenis                                vitamin D 2000 UNITS  tablet   Take 2,000 Units by mouth daily                                          More Information        Los pólipos del colon y el recto    El colon (denominado también  intestino grueso ) es un conducto muscular de 4 a 6 pies (120 a 180 cm) de longitud que constituye la última parte del aparato digestivo, y tiene la función de absorber agua y almacenar los desechos de los alimentos. El recto es la porción terminal del colon, y mide unas 6 pulgadas (15 cm). El colon y el recto tienen un revestimiento interno liso que está compuesto de millones de células. Los cambios que experimentan estas células pueden producir unas masas en el colon que tienen el potencial de volverse cancerosas, y deben extirparse (sacarse).  Cuando cambia el revestimiento interno del colon  Los cambios que se producen en las células que revisten el colon o el recto pueden rohit lugar a unas masas denominadas pólipos que pueden volverse cancerosas con el paso de los años. Extirpar los pólipos temprano puede impedir que llegue a formarse el cáncer.  Pólipos  Los pólipos son masas carnosas de tejido que se anthony en el revestimiento interno del colon o el recto. Los pólipos pequeños suelen ser benignos (es decir, no cancerosos). Sin embargo, con el tiempo las células de un pólipo pueden transformarse y volverse cancerosas. Ciertos tipos de pólipos (denominados adenomatosos) son premalignos. El riesgo de cáncer invasivo aumenta según el tamaño del pólipo y ciertas características celulares y genéticas. [place] significa que se pueden volver cancerosos si se mc sin extirpar. Los pólipos hiperplásicos son benignos, ya que pueden agrandarse bastante sin tornarse cancerosos.  Cáncer  Mariajose todos los tumores cancerosos del colon y el recto comienzan cuando las células de un pólipo empiezan a crecer de forma anormal. A medida que va creciendo, el tumor canceroso puede afectar a judith porción cada vez mayor del colon o el recto. Con el tiempo, el cáncer  también puede extenderse más allá del colon o el recto, e invadir los órganos cercanos o unas glándulas llamadas  ganglios linfáticos . Las células también pueden propagarse a otras partes del cuerpo, en un proceso denominado  metástasis . Cuanto más pronto se extirpe un tumor canceroso, mejores serán las probabilidades de evitar que se disemine.    Date Last Reviewed: 3/20/2014    [id number] [employer] [address] [place] [id number]. Todos los derechos reservados. Esta información no pretende sustituir la atención médica profesional. Sólo vaughan médico puede diagnosticar y tratar un problema de morgan.

## 2017-06-23 NOTE — PROCEDURES
Pre-Endoscopy History and Physical     Jennifer Fallon MRN# [id number]   YOB: 1955 Age: 62 year old     Date of Procedure: 6/23/2017  Primary care provider: Kesha Rodas  Type of Endoscopy: colonoscopy  Reason for Procedure: screening  Type of Anesthesia Anticipated: Moderate (conscious) sedation    HPI:    Jennifer is a 62 year old female who will be undergoing the above procedure.      A history and physical has been performed. The patient's medications and allergies have been reviewed. The risks and benefits of the procedure and the sedation options and risks were discussed with the patient.  All questions were answered and informed consent was obtained.      No Known Allergies     Current Facility-Administered Medications   Medication     lidocaine 1 % 1 mL     lidocaine (LMX4) kit     sodium chloride (PF) 0.9% PF flush 3 mL     sodium chloride (PF) 0.9% PF flush 3 mL     0.9% sodium chloride BOLUS     sodium chloride (PF) 0.9% PF flush 3 mL     ondansetron (ZOFRAN) injection 4 mg       Patient Active Problem List   Diagnosis     Advanced directives, counseling/discussion     CARDIOVASCULAR SCREENING; LDL GOAL LESS THAN 70     Postmenopausal status     Bilateral ankle pain     Dermatophytosis of nail     Diabetes mellitus type 2 in obese (H)     Hypothyroidism, unspecified type     Iron deficiency anemia, unspecified iron deficiency anemia type     Vitamin D deficiency        Past Medical History:   Diagnosis Date     CARDIOVASCULAR SCREENING; LDL GOAL LESS THAN 130      Constipation      Diabetes (H) diagnosed 2016    controlled with diet only; no meds.     Diarrhea      Hypothyroidism         Past Surgical History:   Procedure Laterality Date     CHOLECYSTECTOMY  2004     TUBAL LIGATION  1987       Social History   Substance Use Topics     Smoking status: Never Smoker     Smokeless tobacco: Never Used     Alcohol use 0.0 oz/week     0 Standard drinks or equivalent per week      Comment:  "Socially       Family History   Problem Relation Age of Onset     DIABETES Brother      DIABETES Brother      Coronary Artery Disease Mother      Breast Cancer No family hx of      Colon Cancer No family hx of      Prostate Cancer No family hx of      Other Cancer No family hx of             Medications:     Prescriptions Prior to Admission   Medication Sig Dispense Refill Last Dose     Cholecalciferol (VITAMIN D) 2000 UNITS tablet Take 2,000 Units by mouth daily 100 tablet 3 6/22/2017     levothyroxine (SYNTHROID/LEVOTHROID) 112 MCG tablet Take 1 tablet (112 mcg) by mouth daily 90 tablet 1 6/23/2017     blood glucose monitoring (ACCU-CHEK ARGENIS) test strip Use to test blood sugars 1 times daily or as directed. 3 Box 1      order for DME Equipment being ordered: All DM testing supplies including test strips, lancets, for testing 2 times per day  accucheck argenis 3 Month 3      ferrous sulfate (IRON) 325 (65 FE) MG tablet Take 1 tablet (325 mg) by mouth daily (with breakfast) 30 tablet 6 6/19/2017     blood glucose monitoring (ACCU-CHEK ARGENIS PLUS) meter device kit Use to test blood sugars 1 times daily or as directed. 1 kit 0      blood glucose monitoring (ACCU-CHEK MULTICLIX) lancets Use to test blood sugar 1 times daily or as directed. 1 Box 0 Taking     ibuprofen (ADVIL/MOTRIN) 600 MG tablet Take 1 tablet (600 mg) by mouth 2 times daily as needed for moderate pain 60 tablet 1 More than a month     cyclobenzaprine (FLEXERIL) 10 MG tablet Take 0.5-1 tablets (5-10 mg) by mouth 3 times daily as needed for muscle spasms 30 tablet 1 More than a month       Scheduled Medications:    sodium chloride (PF)  3 mL Intravenous Q8H     sodium chloride 0.9%  500 mL Intravenous Once       PRN:  lidocaine, lidocaine 4%, sodium chloride (PF), sodium chloride (PF), ondansetron    PHYSICAL EXAM:   /57  Pulse 58  Resp 12  Ht 1.499 m (4' 11\")  Wt 86.6 kg (191 lb)  SpO2 98%  BMI 38.58 kg/m2 Estimated body mass index is 38.58 " "kg/(m^2) as calculated from the following:    Height as of this encounter: 1.499 m (4' 11\").    Weight as of this encounter: 86.6 kg (191 lb).   RESP: lungs clear to auscultation - no rales, rhonchi or wheezes  CV: regular rates and rhythm    IMPRESSION   ASA Class 2 - Mild systemic disease      Signed Electronically by: Chang Beasley MD  June 23, 2017    .            "

## 2017-06-26 LAB — COPATH REPORT: NORMAL

## 2017-10-30 ENCOUNTER — THERAPY VISIT (OUTPATIENT)
Dept: PHYSICAL THERAPY | Facility: CLINIC | Age: 62
End: 2017-10-30
Payer: COMMERCIAL

## 2017-10-30 DIAGNOSIS — M76.31 ILIOTIBIAL BAND SYNDROME OF RIGHT SIDE: Primary | ICD-10-CM

## 2017-10-30 PROCEDURE — 97162 PT EVAL MOD COMPLEX 30 MIN: CPT | Mod: GP | Performed by: PHYSICAL THERAPIST

## 2017-10-30 PROCEDURE — 97110 THERAPEUTIC EXERCISES: CPT | Mod: GP | Performed by: PHYSICAL THERAPIST

## 2017-10-30 NOTE — LETTER
SEJAL OLVERA BURNSLEILA PT  [address]  [facility] [address]  [phone]    2017    Re: Jennifer Fallon   :   1955  MRN:  [id number]   REFERRING PHYSICIAN:   Jo-Ann JULIEN PT  Date of Initial Evaluation:  10-30-17  Visits:  Rxs Used: 1  Reason for Referral:  Iliotibial band syndrome of right side    EVALUATION SUMMARY    Subjective:    Patient is a 62 year old female presenting with rehab right knee hpi. The history is provided by the patient. A  was used.  Jennifer Fallon is a 62 year old female with a right knee condition.    Condition occurred: at work.  This is a new condition  Patient fell at work on 10/05/17. Was just walking and not sure how she fell but landed on R side. Hurt R wrist/R shoulder, given splint. Also hurts along entire R lateral side of body from hip/knee/ankle/foot. X-rays negative. WORSE with initial walking/first few steps, end of the day/nighttime, stairs one at a time, position changes. BETTER with rest. Improving slowly and is back to work now. .      Pain is described as aching and is constant and reported as 4/10.   Pain is worse in the P.M..     Since onset symptoms are gradually improving.  Special tests:  X-ray.      General health as reported by patient is good.  Pertinent medical history includes:  Diabetes, overweight and thyroid problems.  Medical allergies: no.  Other surgeries include:  Other (gall bladder, tubal ligation).  Current medications:  Thyroid medication (ibuprofen prn).  Current occupation is cleaning.               Objective:    Standing Alignment:    Lumbar:  Lordosis decr  Pelvic:  Normal  Gait:    Gait Type:  Antalgic     Deviations:  Hip:  Trendelenberg LGeneral Deviations:  Stance time decr  Flexibility/Screens:   Lower Extremity:  Decreased right lower extremity flexibility:  Hip Flexors; IT Band and Gastroc       Lumbar/SI Evaluation  ROM:  AROM Lumbar: normal  Strength: B  bridge fair control and painfree. R glute medius strength 3-/5, L 4/5. Fair TA set in hooklying  Lumbar Myotomes:  normal  Lumbar Dermtomes:  normal  Neural Tension/Mobility:  Lumbar:  Normal             Re: Jennifer Fallon   :   1955      Hip Evaluation  Hip PROM:  Hip PROM:  Left Hip:    Normal  Right Hip:  Normal    Hip Special Testing:    Left hip negative for the following special tests:  Peyton; Fadir/Labrum or Napoleon's  Right hip negative for the following special tests:  Peyton; Fadir/Labrum or Napoleon's    Hip Palpation:    Right hip tenderness present at:  Greater Trachanter and IT Band                                  Musculoskeletal:        Legs:    Assessment/Plan:      Patient is a 62 year old female with right side hip, right side knee and right side ankle complaints.    Patient has the following significant findings with corresponding treatment plan.                Diagnosis 1:  R leg pain  Pain -  hot/cold therapy, manual therapy, splint/taping/bracing/orthotics, self management, education and home program  Decreased ROM/flexibility - manual therapy and therapeutic exercise  Decreased strength - therapeutic exercise and therapeutic activities  Decreased proprioception - neuro re-education and therapeutic activities  Inflammation - cold therapy and self management/home program  Impaired gait - gait training  Impaired muscle performance - neuro re-education  Decreased function - therapeutic activities  Impaired posture - neuro re-education    Therapy Evaluation Codes:   1) History comprised of:   Personal factors that impact the plan of care:      None.    Comorbidity factors that impact the plan of care are:      Diabetes.     Medications impacting care: ibuprofen prn.      Re: Jennifer Fallon   :   1955      2) Examination of Body Systems comprised of:   Body structures and functions that impact the plan of care:      Hip, Knee, Lumbar spine and Pelvis.   Activity limitations that  impact the plan of care are:      Stairs, Walking and Working.  3) Clinical presentation characteristics are:   Evolving/Changing.  4) Decision-Making    Moderate complexity using standardized patient assessment instrument and/or measureable assessment of functional outcome.  Cumulative Therapy Evaluation is: Moderate complexity.    Previous and current functional limitations:  (See Goal Flow Sheet for this information)    Short term and Long term goals: (See Goal Flow Sheet for this information)     Communication ability:  Patient appears to be able to clearly communicate and understand verbal and written communication and follow directions correctly.  Treatment Explanation - The following has been discussed with the patient:   RX ordered/plan of care  Anticipated outcomes  Possible risks and side effects  This patient would benefit from PT intervention to resume normal activities.   Rehab potential is good.    Frequency:  1 X week, once daily  Duration:  for 6 weeks  Discharge Plan:  Achieve all LTG.  Independent in home treatment program.  Reach maximal therapeutic benefit.    Thank you for your referral.    INQUIRIES  Therapist: Willa Addison, MS, PT, OCS  [facility]  [id number] [place] [address]  Phone: [phone]  Fax: [phone]

## 2017-10-30 NOTE — PROGRESS NOTES
Subjective:    Patient is a 62 year old female presenting with rehab right knee hpi. The history is provided by the patient. A  was used.   Jennifer Fallon is a 62 year old female with a right knee condition.    Condition occurred: at work.  This is a new condition  Patient fell at work on 10/05/17. Was just walking and not sure how she fell but landed on R side. Hurt R wrist/R shoulder, given splint. Also hurts along entire R lateral side of body from hip/knee/ankle/foot. X-rays negative. WORSE with initial walking/first few steps, end of the day/nighttime, stairs one at a time, position changes. BETTER with rest. Improving slowly and is back to work now. .        Pain is described as aching and is constant and reported as 4/10.   Pain is worse in the P.M..     Since onset symptoms are gradually improving.  Special tests:  X-ray.      General health as reported by patient is good.  Pertinent medical history includes:  Diabetes, overweight and thyroid problems.  Medical allergies: no.  Other surgeries include:  Other (gall bladder, tubal ligation).  Current medications:  Thyroid medication (ibuprofen prn).  Current occupation is cleaning.                                    Objective:    Standing Alignment:        Lumbar:  Lordosis decr  Pelvic:  Normal          Gait:    Gait Type:  Antalgic     Deviations:  Hip:  Trendelenberg LGeneral Deviations:  Stance time decr    Flexibility/Screens:       Lower Extremity:      Decreased right lower extremity flexibility:  Hip Flexors; IT Band and Gastroc               Lumbar/SI Evaluation  ROM:  AROM Lumbar: normal    Strength: B bridge fair control and painfree. R glute medius strength 3-/5, L 4/5. Fair TA set in hooklying  Lumbar Myotomes:  normal                Lumbar Dermtomes:  normal                Neural Tension/Mobility:  Lumbar:  Normal                                                  Hip Evaluation  Hip PROM:  Hip PROM:  Left Hip:     Normal  Right Hip:  Normal                            Hip Special Testing:      Left hip negative for the following special tests:  Peyton; Fadir/Labrum or Napoleon's  Right hip negative for the following special tests:  Peyton; Fadir/Labrum or Napoleon's    Hip Palpation:      Right hip tenderness present at:  Greater Trachanter and IT Band                                                  Musculoskeletal:        Legs:      ROS    Assessment/Plan:      Patient is a 62 year old female with right side hip, right side knee and right side ankle complaints.    Patient has the following significant findings with corresponding treatment plan.                Diagnosis 1:  R leg pain  Pain -  hot/cold therapy, manual therapy, splint/taping/bracing/orthotics, self management, education and home program  Decreased ROM/flexibility - manual therapy and therapeutic exercise  Decreased strength - therapeutic exercise and therapeutic activities  Decreased proprioception - neuro re-education and therapeutic activities  Inflammation - cold therapy and self management/home program  Impaired gait - gait training  Impaired muscle performance - neuro re-education  Decreased function - therapeutic activities  Impaired posture - neuro re-education    Therapy Evaluation Codes:   1) History comprised of:   Personal factors that impact the plan of care:      None.    Comorbidity factors that impact the plan of care are:      Diabetes.     Medications impacting care: ibuprofen prn.  2) Examination of Body Systems comprised of:   Body structures and functions that impact the plan of care:      Hip, Knee, Lumbar spine and Pelvis.   Activity limitations that impact the plan of care are:      Stairs, Walking and Working.  3) Clinical presentation characteristics are:   Evolving/Changing.  4) Decision-Making    Moderate complexity using standardized patient assessment instrument and/or measureable assessment of functional outcome.  Cumulative Therapy  Evaluation is: Moderate complexity.    Previous and current functional limitations:  (See Goal Flow Sheet for this information)    Short term and Long term goals: (See Goal Flow Sheet for this information)     Communication ability:  Patient appears to be able to clearly communicate and understand verbal and written communication and follow directions correctly.  Treatment Explanation - The following has been discussed with the patient:   RX ordered/plan of care  Anticipated outcomes  Possible risks and side effects  This patient would benefit from PT intervention to resume normal activities.   Rehab potential is good.    Frequency:  1 X week, once daily  Duration:  for 6 weeks  Discharge Plan:  Achieve all LTG.  Independent in home treatment program.  Reach maximal therapeutic benefit.    Please refer to the daily flowsheet for treatment today, total treatment time and time spent performing 1:1 timed codes.

## 2017-10-30 NOTE — MR AVS SNAPSHOT
After Visit Summary   10/30/2017    Jennifer Fallon    MRN: [id number]           Patient Information     Date Of Birth          1955        Visit Information        Provider Department      10/30/2017 9:05 AM Willa Addison, PT; [employer] [facility] PT        Today's Diagnoses     Iliotibial band syndrome of right side    -  1       Follow-ups after your visit        Your next 10 appointments already scheduled     Nov 06, 2017  7:00 AM CST   SEJAL Extremity with LOUANN Mas PT ([facility]  )    [address] [place] [address]   [phone]            Nov 13, 2017  7:00 AM CST   SEJAL Extremity with LOUANN Mas PT ([facility]  )    [id number] [place] [address]   [phone]            Nov 24, 2017  8:20 AM CST   SEJAL Extremity with LOUANN Mas PT ([facility]  )    [address] [place] [address]   [phone]            Nov 30, 2017  8:00 AM CST   LAB with [facility] ([facility])    [address] [facility] [id number] [phone]           Please do not eat 10-12 hours before your appointment if you are coming in fasting for labs on lipids, cholesterol, or glucose (sugar). This does not apply to pregnant women. Water, hot tea and black coffee (with nothing added) are okay. Do not drink other fluids, diet soda or chew gum.            Dec 06, 2017  7:00 AM CST   SHORT with ARINA Dee [facility] ([facility])    [address] [place]  [facility] [id number] [phone]              Who to contact     If you have questions or need follow up information about today's clinic visit or your schedule please contact SEJAL JULIEN PT directly at [phone].  Normal or non-critical lab and imaging  "results will be communicated to you by MyChart, letter or phone within 4 business days after the clinic has received the results. If you do not hear from us within 7 days, please contact the clinic through [email or web address] or phone. If you have a critical or abnormal lab result, we will notify you by phone as soon as possible.  Submit refill requests through [employer] or call your pharmacy and they will forward the refill request to us. Please allow 3 business days for your refill to be completed.          Additional Information About Your Visit        [employer] Information     [employer] lets you send messages to your doctor, view your test results, renew your prescriptions, schedule appointments and more. To sign up, go to [email or web address] . Click on \"Log in\" on the left side of the screen, which will take you to the Welcome page. Then click on \"Sign up Now\" on the right side of the page.     You will be asked to enter the access code listed below, as well as some personal information. Please follow the directions to create your username and password.     Your access code is: [email or web address]  Expires: 2018  8:41 AM     Your access code will  in 90 days. If you need help or a new code, please call your [place] clinic or [phone].        Care EveryWhere ID     This is your Care EveryWhere ID. This could be used by other organizations to access your [place] medical records  [id number]         Blood Pressure from Last 3 Encounters:   17 115/67   17 110/60   17 124/70    Weight from Last 3 Encounters:   17 86.6 kg (191 lb)   17 86.6 kg (191 lb)   17 83.5 kg (184 lb)              We Performed the Following     HC PT EVAL, MODERATE COMPLEXITY     SEJAL INITIAL EVAL REPORT     THERAPEUTIC EXERCISES        Primary Care Provider Office Phone # Fax #    ARINA Dee [phone]       [address] [facility] [address]        Equal Access to Services "     [facility]: Hadii aad ku edis Soconnerali, waaxda luqadaha, qaybta kaalmada adelindsey, bonnie delmi kamaljitjesica phamjacob arevalo hansel . So [facility] [phone].    ATENCIÓN: Si habla español, tiene a vaughan disposición servicios gratuitos de asistencia lingüística. Llame al [phone].    We comply with applicable federal civil rights laws and [place] laws. We do not discriminate on the basis of race, color, national origin, age, disability, sex, sexual orientation, or gender identity.            Thank you!     Thank you for choosing SEJAL JULIEN PT  for your care. Our goal is always to provide you with excellent care. Hearing back from our patients is one way we can continue to improve our services. Please take a few minutes to complete the written survey that you may receive in the mail after your visit with us. Thank you!             Your Updated Medication List - Protect others around you: Learn how to safely use, store and throw away your medicines at www.disposemymeds.org.          This list is accurate as of: 10/30/17 11:59 PM.  Always use your most recent med list.                   Brand Name Dispense Instructions for use Diagnosis    blood glucose monitoring lancets     1 Box    Use to test blood sugar 1 times daily or as directed.    Diabetes mellitus type 2 in obese (H)       blood glucose monitoring meter device kit     1 kit    Use to test blood sugars 1 times daily or as directed.    Diabetes mellitus type 2 in obese (H)       blood glucose monitoring test strip    ACCU-CHEK ARGENIS    3 Box    Use to test blood sugars 1 times daily or as directed.    Diabetes mellitus type 2 in obese (H)       cyclobenzaprine 10 MG tablet    FLEXERIL    30 tablet    Take 0.5-1 tablets (5-10 mg) by mouth 3 times daily as needed for muscle spasms    Hypothyroidism, unspecified type       ferrous sulfate 325 (65 FE) MG tablet    IRON    30 tablet    Take 1 tablet (325 mg) by mouth daily (with breakfast)    Iron  deficiency anemia, unspecified iron deficiency anemia type       ibuprofen 600 MG tablet    ADVIL/MOTRIN    60 tablet    Take 1 tablet (600 mg) by mouth 2 times daily as needed for moderate pain    Upper back pain       levothyroxine 112 MCG tablet    SYNTHROID/LEVOTHROID    90 tablet    Take 1 tablet (112 mcg) by mouth daily    Hypothyroidism, unspecified type       order for DME     3 Month    Equipment being ordered: All DM testing supplies including test strips, lancets, for testing 2 times per day accuchlilian mott    Diabetes mellitus type 2 in obese (H)       vitamin D 2000 UNITS tablet     100 tablet    Take 2,000 Units by mouth daily    Vitamin D deficiency

## 2017-10-31 DIAGNOSIS — E03.9 HYPOTHYROIDISM, UNSPECIFIED TYPE: ICD-10-CM

## 2017-10-31 PROBLEM — M76.31 ILIOTIBIAL BAND SYNDROME OF RIGHT SIDE: Status: ACTIVE | Noted: 2017-10-31

## 2017-10-31 RX ORDER — LEVOTHYROXINE SODIUM 112 UG/1
112 TABLET ORAL DAILY
Qty: 90 TABLET | Refills: 1 | Status: SHIPPED | OUTPATIENT
Start: 2017-10-31 | End: 2018-05-03

## 2017-11-06 ENCOUNTER — THERAPY VISIT (OUTPATIENT)
Dept: PHYSICAL THERAPY | Facility: CLINIC | Age: 62
End: 2017-11-06
Payer: COMMERCIAL

## 2017-11-06 DIAGNOSIS — M76.31 ILIOTIBIAL BAND SYNDROME OF RIGHT SIDE: ICD-10-CM

## 2017-11-06 PROCEDURE — 97140 MANUAL THERAPY 1/> REGIONS: CPT | Mod: GP | Performed by: PHYSICAL THERAPY ASSISTANT

## 2017-11-06 PROCEDURE — 97110 THERAPEUTIC EXERCISES: CPT | Mod: GP | Performed by: PHYSICAL THERAPY ASSISTANT

## 2017-11-13 ENCOUNTER — THERAPY VISIT (OUTPATIENT)
Dept: PHYSICAL THERAPY | Facility: CLINIC | Age: 62
End: 2017-11-13

## 2017-11-13 DIAGNOSIS — M76.31 ILIOTIBIAL BAND SYNDROME OF RIGHT SIDE: ICD-10-CM

## 2017-11-13 PROCEDURE — 97110 THERAPEUTIC EXERCISES: CPT | Mod: GP | Performed by: PHYSICAL THERAPY ASSISTANT

## 2017-11-13 PROCEDURE — 97140 MANUAL THERAPY 1/> REGIONS: CPT | Mod: GP | Performed by: PHYSICAL THERAPY ASSISTANT

## 2017-11-20 ENCOUNTER — THERAPY VISIT (OUTPATIENT)
Dept: PHYSICAL THERAPY | Facility: CLINIC | Age: 62
End: 2017-11-20

## 2017-11-20 DIAGNOSIS — M76.31 ILIOTIBIAL BAND SYNDROME OF RIGHT SIDE: ICD-10-CM

## 2017-11-20 PROCEDURE — 97112 NEUROMUSCULAR REEDUCATION: CPT | Mod: GP | Performed by: PHYSICAL THERAPY ASSISTANT

## 2017-11-20 PROCEDURE — 97110 THERAPEUTIC EXERCISES: CPT | Mod: GP | Performed by: PHYSICAL THERAPY ASSISTANT

## 2017-11-20 ASSESSMENT — ACTIVITIES OF DAILY LIVING (ADL)
HOW_WOULD_YOU_RATE_THE_CURRENT_FUNCTION_OF_YOUR_KNEE_DURING_YOUR_USUAL_DAILY_ACTIVITIES_ON_A_SCALE_FROM_0_TO_100_WITH_100_BEING_YOUR_LEVEL_OF_KNEE_FUNCTION_PRIOR_TO_YOUR_INJURY_AND_0_BEING_THE_INABILITY_TO_PERFORM_ANY_OF_YOUR_USUAL_DAILY_ACTIVITIES?: 3
RISE FROM A CHAIR: ACTIVITY IS MINIMALLY DIFFICULT
GIVING WAY, BUCKLING OR SHIFTING OF KNEE: I HAVE THE SYMPTOM BUT IT DOES NOT AFFECT MY ACTIVITY
SIT WITH YOUR KNEE BENT: ACTIVITY IS MINIMALLY DIFFICULT
SQUAT: ACTIVITY IS MINIMALLY DIFFICULT
GO DOWN STAIRS: ACTIVITY IS NOT DIFFICULT
KNEEL ON THE FRONT OF YOUR KNEE: ACTIVITY IS MINIMALLY DIFFICULT
PAIN: I HAVE THE SYMPTOM BUT IT DOES NOT AFFECT MY ACTIVITY
STIFFNESS: I HAVE THE SYMPTOM BUT IT DOES NOT AFFECT MY ACTIVITY
WEAKNESS: I HAVE THE SYMPTOM BUT IT DOES NOT AFFECT MY ACTIVITY
SWELLING: I DO NOT HAVE THE SYMPTOM
GO UP STAIRS: ACTIVITY IS NOT DIFFICULT
WALK: ACTIVITY IS NOT DIFFICULT
HOW_WOULD_YOU_RATE_THE_OVERALL_FUNCTION_OF_YOUR_KNEE_DURING_YOUR_USUAL_DAILY_ACTIVITIES?: NEARLY NORMAL
LIMPING: I HAVE THE SYMPTOM BUT IT DOES NOT AFFECT MY ACTIVITY

## 2017-11-20 NOTE — MR AVS SNAPSHOT
After Visit Summary   11/20/2017    Jennifer Fallon    MRN: [id number]           Patient Information     Date Of Birth          1955        Visit Information        Provider Department      11/20/2017 7:30 AM Celsa Alamo PTA; [employer] SE[facility]AL JULIEN PT        Today's Diagnoses     Iliotibial band syndrome of right side           Follow-ups after your visit        Your next 10 appointments already scheduled     Nov 30, 2017  8:00 AM CST   LAB with [facility]   [facility] ([facility])    [address] [place]  [facility] [address]   [phone]           Please do not eat 10-12 hours before your appointment if you are coming in fasting for labs on lipids, cholesterol, or glucose (sugar). This does not apply to pregnant women. Water, hot tea and black coffee (with nothing added) are okay. Do not drink other fluids, diet soda or chew gum.            Dec 06, 2017  7:00 AM CST   SHORT with ARINA Dee CNP   [facility] ([facility])    [address]  [facility] [address]   [phone]              Who to contact     If you have questions or need follow up information about today's clinic visit or your schedule please contact SEJAL JULIEN PT directly at [phone].  Normal or non-critical lab and imaging results will be communicated to you by MyChart, letter or phone within 4 business days after the clinic has received the results. If you do not hear from us within 7 days, please contact the clinic through MyChart or phone. If you have a critical or abnormal lab result, we will notify you by phone as soon as possible.  Submit refill requests through [employer] or call your pharmacy and they will forward the refill request to us. Please allow 3 business days for your refill to be completed.          Additional Information About Your Visit        MyChart Information     [employer]  "lets you send messages to your doctor, view your test results, renew your prescriptions, schedule appointments and more. To sign up, go to [email or web address] . Click on \"Log in\" on the left side of the screen, which will take you to the Welcome page. Then click on \"Sign up Now\" on the right side of the page.     You will be asked to enter the access code listed below, as well as some personal information. Please follow the directions to create your username and password.     Your access code is: [email or web address]  Expires: 2018  7:41 AM     Your access code will  in 90 days. If you need help or a new code, please call your [place] clinic or [phone].        Care EveryWhere ID     This is your Care EveryWhere ID. This could be used by other organizations to access your [place] medical records  [id number]         Blood Pressure from Last 3 Encounters:   17 115/67   17 110/60   17 124/70    Weight from Last 3 Encounters:   17 86.6 kg (191 lb)   17 86.6 kg (191 lb)   17 83.5 kg (184 lb)              We Performed the Following     Neuromuscular Re-Education     Therapeutic Exercises        Primary Care Provider Office Phone # Fax #    Kesha ARINA De La Torre [facility] [phone]       [address] [facility] [address]        Equal Access to Services     [facility]: Hadii aad ku hadasho Soomaali, waaxda luqadaha, qaybta kaalmada adeegyada, bonnie hamm . So [facility] [phone].    ATENCIÓN: Si habla español, tiene a vaughan disposición servicios gratuitos de asistencia lingüística. Barbi al [phone].    We comply with applicable federal civil rights laws and [place] laws. We do not discriminate on the basis of race, color, national origin, age, disability, sex, sexual orientation, or gender identity.            Thank you!     Thank you for choosing [facility] PT  for your care. Our goal is always to provide you " with excellent care. Hearing back from our patients is one way we can continue to improve our services. Please take a few minutes to complete the written survey that you may receive in the mail after your visit with us. Thank you!             Your Updated Medication List - Protect others around you: Learn how to safely use, store and throw away your medicines at www.disposemymeds.org.          This list is accurate as of: 11/20/17  8:55 AM.  Always use your most recent med list.                   Brand Name Dispense Instructions for use Diagnosis    blood glucose monitoring lancets     1 Box    Use to test blood sugar 1 times daily or as directed.    Diabetes mellitus type 2 in obese (H)       blood glucose monitoring meter device kit     1 kit    Use to test blood sugars 1 times daily or as directed.    Diabetes mellitus type 2 in obese (H)       blood glucose monitoring test strip    ACCU-CHEK ARGENIS    3 Box    Use to test blood sugars 1 times daily or as directed.    Diabetes mellitus type 2 in obese (H)       cyclobenzaprine 10 MG tablet    FLEXERIL    30 tablet    Take 0.5-1 tablets (5-10 mg) by mouth 3 times daily as needed for muscle spasms    Hypothyroidism, unspecified type       ferrous sulfate 325 (65 FE) MG tablet    IRON    30 tablet    Take 1 tablet (325 mg) by mouth daily (with breakfast)    Iron deficiency anemia, unspecified iron deficiency anemia type       ibuprofen 600 MG tablet    ADVIL/MOTRIN    60 tablet    Take 1 tablet (600 mg) by mouth 2 times daily as needed for moderate pain    Upper back pain       levothyroxine 112 MCG tablet    SYNTHROID/LEVOTHROID    90 tablet    Take 1 tablet (112 mcg) by mouth daily    Hypothyroidism, unspecified type       order for DME     3 Month    Equipment being ordered: All DM testing supplies including test strips, lancets, for testing 2 times per day accucheck argenis    Diabetes mellitus type 2 in obese (H)       vitamin D 2000 UNITS tablet     100 tablet     Take 2,000 Units by mouth daily    Vitamin D deficiency

## 2017-11-20 NOTE — PROGRESS NOTES
Subjective:    HPI                    Objective:    System    Physical Exam    General     ROS    Assessment/Plan:      ASSESSMENT/PLAN  Updated problem list and treatment plan:   STG/LTGs have been met:  Yes (See Goal flow sheet completed today.)  Progress toward STG/LTGs have been made:  Yes (See Goal flow sheet completed today.)  Assessment of Progress: The patient's condition is improving.  Self Management Plans:  Patient is independent in a home treatment program.  Patient is independent in self management of symptoms.    Jennifer continues to require the following intervention to meet STG and LT's:  PT intervention is no longer required to meet STG/LTG.    Recommendations:  This patient is ready to be discharged from therapy and continue their home treatment program.    Please refer to the daily flowsheet for treatment today, total treatment time and time spent performing 1:1 timed codes.

## 2017-11-20 NOTE — LETTER
SEJAL JULIEN PT  [address] [facility]  [address]  [facility] [address]  [phone]    2017    Re: Jennifer Fallon   :   1955  MRN:  [id number]   REFERRING PHYSICIAN:   Jo-Ann JULIEN PT    Date of Initial Evaluation:  10/30/2017  Visits:  Rxs Used: 4  Reason for Referral:  Iliotibial band syndrome of right side    DISCHARGE REPORT    Progress reporting period is from 10/20/17 to 17.      SUBJECTIVE  Subjective changes noted by patient:  Patient reports that stairs are better, Low pain over the last week.  Patient feels that she is 85% better. Still having some difficulty with getting out of the chair  Current pain level is .  Current Pain level: 0/10 (low pain during the week)    Previous pain level was:       Changes in function:  Yes (See Goal flowsheet attached for changes in current functional level)     Adverse reaction to treatment or activity: None     OBJECTIVE  Changes noted in objective findings:    Objective: Progressing with exercises today.  Patient does walk the stairs with a reciprocal pattern and does not need the HR. Slight upper body counterbalance with walking.  Right knee heelslides with knee ROM of 0-0-135.    ASSESSMENT/PLAN  Updated problem list and treatment plan:   STG/LTGs have been met:  Yes (See Goal flow sheet completed today.)  Progress toward STG/LTGs have been made:  Yes (See Goal flow sheet completed today.)  Assessment of Progress: The patient's condition is improving.  Self Management Plans:  Patient is independent in a home treatment program.  Patient is independent in self management of symptoms.    Jennifer continues to require the following intervention to meet STG and LT's:  PT intervention is no longer required to meet STG/LTG.    Recommendations:  This patient is ready to be discharged from therapy and continue their home treatment program.      Thank you for your referral.    INQUIRIES  Therapist: Celsa Alamo  PTA   Willa Addison, MS, PT, OCS  [facility] [place] [facility]  [address] [id number]  Phone: [phone] / Fax: [phone]

## 2017-11-20 NOTE — PROGRESS NOTES
Subjective:    HPI                    Objective:    System    Physical Exam    General     ROS    Assessment/Plan:      DISCHARGE REPORT    Progress reporting period is from 10/20/17 to 11/20/17.      SUBJECTIVE  Subjective changes noted by patient:  Patient reports that stairs are better, Low pain over the last week.  Patient feels that she is 85% better. Still having some difficulty with getting out of the chair  Current pain level is .  Current Pain level: 0/10 (low pain during the week)    Previous pain level was:       Changes in function:  Yes (See Goal flowsheet attached for changes in current functional level)     Adverse reaction to treatment or activity: None     OBJECTIVE  Changes noted in objective findings:    Objective: Progressing with exercises today.  Patient does walk the stairs with a reciprocal pattern and does not need the HR. Slight upper body counterbalance with walking.  Right knee heelslides with knee ROM of 0-0-135.

## 2017-11-30 DIAGNOSIS — D50.9 IRON DEFICIENCY ANEMIA, UNSPECIFIED IRON DEFICIENCY ANEMIA TYPE: ICD-10-CM

## 2017-11-30 DIAGNOSIS — E11.69 DIABETES MELLITUS TYPE 2 IN OBESE: ICD-10-CM

## 2017-11-30 DIAGNOSIS — E55.9 VITAMIN D DEFICIENCY: Primary | ICD-10-CM

## 2017-11-30 DIAGNOSIS — E55.9 VITAMIN D DEFICIENCY: ICD-10-CM

## 2017-11-30 DIAGNOSIS — Z13.6 CARDIOVASCULAR SCREENING; LDL GOAL LESS THAN 70: ICD-10-CM

## 2017-11-30 DIAGNOSIS — E66.9 DIABETES MELLITUS TYPE 2 IN OBESE: ICD-10-CM

## 2017-11-30 DIAGNOSIS — E03.9 HYPOTHYROIDISM, UNSPECIFIED TYPE: ICD-10-CM

## 2017-11-30 LAB
ALBUMIN SERPL-MCNC: 3.3 G/DL (ref 3.4–5)
ALP SERPL-CCNC: 108 U/L (ref 40–150)
ALT SERPL W P-5'-P-CCNC: 34 U/L (ref 0–50)
ANION GAP SERPL CALCULATED.3IONS-SCNC: 7 MMOL/L (ref 3–14)
AST SERPL W P-5'-P-CCNC: 17 U/L (ref 0–45)
BASOPHILS # BLD AUTO: 0 10E9/L (ref 0–0.2)
BASOPHILS NFR BLD AUTO: 0.1 %
BILIRUB SERPL-MCNC: 0.4 MG/DL (ref 0.2–1.3)
BUN SERPL-MCNC: 19 MG/DL (ref 7–30)
CALCIUM SERPL-MCNC: 8.9 MG/DL (ref 8.5–10.1)
CHLORIDE SERPL-SCNC: 106 MMOL/L (ref 94–109)
CHOLEST SERPL-MCNC: 162 MG/DL
CO2 SERPL-SCNC: 25 MMOL/L (ref 20–32)
CREAT SERPL-MCNC: 0.49 MG/DL (ref 0.52–1.04)
CREAT UR-MCNC: 88 MG/DL
DEPRECATED CALCIDIOL+CALCIFEROL SERPL-MC: 15 UG/L (ref 20–75)
DIFFERENTIAL METHOD BLD: ABNORMAL
EOSINOPHIL # BLD AUTO: 0.2 10E9/L (ref 0–0.7)
EOSINOPHIL NFR BLD AUTO: 1.7 %
ERYTHROCYTE [DISTWIDTH] IN BLOOD BY AUTOMATED COUNT: 14.4 % (ref 10–15)
FERRITIN SERPL-MCNC: 17 NG/ML (ref 8–252)
GFR SERPL CREATININE-BSD FRML MDRD: >90 ML/MIN/1.7M2
GLUCOSE SERPL-MCNC: 101 MG/DL (ref 70–99)
HBA1C MFR BLD: 6.2 % (ref 4.3–6)
HCT VFR BLD AUTO: 37.3 % (ref 35–47)
HDLC SERPL-MCNC: 43 MG/DL
HGB BLD-MCNC: 11.7 G/DL (ref 11.7–15.7)
IRON SATN MFR SERPL: 13 % (ref 15–46)
IRON SERPL-MCNC: 50 UG/DL (ref 35–180)
LDLC SERPL CALC-MCNC: 86 MG/DL
LYMPHOCYTES # BLD AUTO: 2.1 10E9/L (ref 0.8–5.3)
LYMPHOCYTES NFR BLD AUTO: 20.4 %
MCH RBC QN AUTO: 28 PG (ref 26.5–33)
MCHC RBC AUTO-ENTMCNC: 31.4 G/DL (ref 31.5–36.5)
MCV RBC AUTO: 89 FL (ref 78–100)
MICROALBUMIN UR-MCNC: 8 MG/L
MICROALBUMIN/CREAT UR: 9.39 MG/G CR (ref 0–25)
MONOCYTES # BLD AUTO: 0.7 10E9/L (ref 0–1.3)
MONOCYTES NFR BLD AUTO: 6.6 %
NEUTROPHILS # BLD AUTO: 7.2 10E9/L (ref 1.6–8.3)
NEUTROPHILS NFR BLD AUTO: 71.2 %
NONHDLC SERPL-MCNC: 119 MG/DL
PLATELET # BLD AUTO: 238 10E9/L (ref 150–450)
POTASSIUM SERPL-SCNC: 3.6 MMOL/L (ref 3.4–5.3)
PROT SERPL-MCNC: 7.5 G/DL (ref 6.8–8.8)
RBC # BLD AUTO: 4.18 10E12/L (ref 3.8–5.2)
SODIUM SERPL-SCNC: 138 MMOL/L (ref 133–144)
T4 FREE SERPL-MCNC: 1.26 NG/DL (ref 0.76–1.46)
TIBC SERPL-MCNC: 390 UG/DL (ref 240–430)
TRIGL SERPL-MCNC: 165 MG/DL
TSH SERPL DL<=0.005 MIU/L-ACNC: 5.15 MU/L (ref 0.4–4)
WBC # BLD AUTO: 10.1 10E9/L (ref 4–11)

## 2017-11-30 PROCEDURE — 83550 IRON BINDING TEST: CPT | Performed by: NURSE PRACTITIONER

## 2017-11-30 PROCEDURE — 82728 ASSAY OF FERRITIN: CPT | Performed by: NURSE PRACTITIONER

## 2017-11-30 PROCEDURE — 83540 ASSAY OF IRON: CPT | Performed by: NURSE PRACTITIONER

## 2017-11-30 PROCEDURE — 82043 UR ALBUMIN QUANTITATIVE: CPT | Performed by: NURSE PRACTITIONER

## 2017-11-30 PROCEDURE — 83036 HEMOGLOBIN GLYCOSYLATED A1C: CPT | Performed by: NURSE PRACTITIONER

## 2017-11-30 PROCEDURE — T1013 SIGN LANG/ORAL INTERPRETER: HCPCS | Mod: U3 | Performed by: NURSE PRACTITIONER

## 2017-11-30 PROCEDURE — 80050 GENERAL HEALTH PANEL: CPT | Performed by: NURSE PRACTITIONER

## 2017-11-30 PROCEDURE — 82306 VITAMIN D 25 HYDROXY: CPT | Performed by: NURSE PRACTITIONER

## 2017-11-30 PROCEDURE — 80061 LIPID PANEL: CPT | Performed by: NURSE PRACTITIONER

## 2017-11-30 PROCEDURE — 36415 COLL VENOUS BLD VENIPUNCTURE: CPT | Performed by: NURSE PRACTITIONER

## 2017-11-30 PROCEDURE — 84439 ASSAY OF FREE THYROXINE: CPT | Performed by: NURSE PRACTITIONER

## 2017-11-30 RX ORDER — ERGOCALCIFEROL 1.25 MG/1
50000 CAPSULE, LIQUID FILLED ORAL
Qty: 8 CAPSULE | Refills: 0 | Status: SHIPPED | OUTPATIENT
Start: 2017-11-30 | End: 2018-01-19

## 2017-12-06 ENCOUNTER — OFFICE VISIT (OUTPATIENT)
Dept: INTERNAL MEDICINE | Facility: CLINIC | Age: 62
End: 2017-12-06
Payer: COMMERCIAL

## 2017-12-06 VITALS
WEIGHT: 189.6 LBS | TEMPERATURE: 97.8 F | OXYGEN SATURATION: 98 % | DIASTOLIC BLOOD PRESSURE: 70 MMHG | HEART RATE: 76 BPM | BODY MASS INDEX: 38.22 KG/M2 | HEIGHT: 59 IN | SYSTOLIC BLOOD PRESSURE: 114 MMHG

## 2017-12-06 DIAGNOSIS — Z13.6 CARDIOVASCULAR SCREENING; LDL GOAL LESS THAN 70: ICD-10-CM

## 2017-12-06 DIAGNOSIS — E66.9 DIABETES MELLITUS TYPE 2 IN OBESE: ICD-10-CM

## 2017-12-06 DIAGNOSIS — R04.0 BLOODY NOSE: Primary | ICD-10-CM

## 2017-12-06 DIAGNOSIS — E03.9 HYPOTHYROIDISM, UNSPECIFIED TYPE: ICD-10-CM

## 2017-12-06 DIAGNOSIS — E55.9 VITAMIN D DEFICIENCY: ICD-10-CM

## 2017-12-06 DIAGNOSIS — D50.9 IRON DEFICIENCY ANEMIA, UNSPECIFIED IRON DEFICIENCY ANEMIA TYPE: ICD-10-CM

## 2017-12-06 DIAGNOSIS — E11.69 DIABETES MELLITUS TYPE 2 IN OBESE: ICD-10-CM

## 2017-12-06 DIAGNOSIS — Z23 NEED FOR PROPHYLACTIC VACCINATION AGAINST STREPTOCOCCUS PNEUMONIAE (PNEUMOCOCCUS): ICD-10-CM

## 2017-12-06 PROCEDURE — 90471 IMMUNIZATION ADMIN: CPT | Performed by: NURSE PRACTITIONER

## 2017-12-06 PROCEDURE — 90732 PPSV23 VACC 2 YRS+ SUBQ/IM: CPT | Performed by: NURSE PRACTITIONER

## 2017-12-06 PROCEDURE — 99215 OFFICE O/P EST HI 40 MIN: CPT | Mod: 25 | Performed by: NURSE PRACTITIONER

## 2017-12-06 ASSESSMENT — PATIENT HEALTH QUESTIONNAIRE - PHQ9: SUM OF ALL RESPONSES TO PHQ QUESTIONS 1-9: 3

## 2017-12-06 NOTE — NURSING NOTE
"Chief Complaint   Patient presents with     RECHECK     Interpretor in room.     Diabetes       Initial /70 (BP Location: Left arm, Patient Position: Chair, Cuff Size: Adult Large)  Pulse 76  Temp 97.8  F (36.6  C) (Oral)  Ht 4' 11\" (1.499 m)  Wt 189 lb 9.6 oz (86 kg)  SpO2 98%  BMI 38.29 kg/m2 Estimated body mass index is 38.29 kg/(m^2) as calculated from the following:    Height as of this encounter: 4' 11\" (1.499 m).    Weight as of this encounter: 189 lb 9.6 oz (86 kg).  Medication Reconciliation: complete    "

## 2017-12-06 NOTE — PROGRESS NOTES
SUBJECTIVE:   Jennifer Fallon is a 62 year old female who presents to clinic today for the following health issues:    Bloody nose every day x 4 days.   Started without anything ; left nare   Lasted about 8 hours off and on  - had some clots     Diabetes Follow-up      Patient is checking blood sugars: 2 times a week     Diabetic concerns: None     Symptoms of hypoglycemia (low blood sugar): none     Paresthesias (numbness or burning in feet) or sores: No     Date of last diabetic eye exam: never    BP Readings from Last 2 Encounters:   12/06/17 114/70   06/23/17 115/67     Hemoglobin A1C (%)   Date Value   11/30/2017 6.2 (H)   03/01/2017 5.9     LDL Cholesterol Calculated (mg/dL)   Date Value   11/30/2017 86   06/12/2017 72     Fell 2 months ago   Injured right arm and leg   physical therapy done   Gave her muscle relaxer for pain   Not taking now     4 teeth removed           Amount of exercise or physical activity: 2-3 days/week for an average of 30-45 minutes    Problems taking medications regularly: No    Medication side effects: none    Diet: diabetic            Problem list and histories reviewed & adjusted, as indicated.  Additional history: as documented    Patient Active Problem List   Diagnosis     Advanced directives, counseling/discussion     CARDIOVASCULAR SCREENING; LDL GOAL LESS THAN 70     Postmenopausal status     Bilateral ankle pain     Dermatophytosis of nail     Diabetes mellitus type 2 in obese (H)     Hypothyroidism, unspecified type     Iron deficiency anemia, unspecified iron deficiency anemia type     Vitamin D deficiency     Past Surgical History:   Procedure Laterality Date     CHOLECYSTECTOMY  2004     COLONOSCOPY Left 6/23/2017    Procedure: COMBINED COLONOSCOPY, SINGLE OR MULTIPLE BIOPSY/POLYPECTOMY BY BIOPSY;  COLONOSCOPY with polypectomy of colon polyp by cold biopsy  ;  Surgeon: Chang Beasley MD;  Location:  GI     TUBAL LIGATION  1987       Social History  "  Substance Use Topics     Smoking status: Never Smoker     Smokeless tobacco: Never Used     Alcohol use 0.0 oz/week     0 Standard drinks or equivalent per week      Comment: Socially     Family History   Problem Relation Age of Onset     DIABETES Brother      DIABETES Brother      Coronary Artery Disease Mother      Breast Cancer No family hx of      Colon Cancer No family hx of      Prostate Cancer No family hx of      Other Cancer No family hx of              Reviewed and updated as needed this visit by clinical staffTobacco  Allergies  Meds  Med Hx  Surg Hx  Fam Hx  Soc Hx      Reviewed and updated as needed this visit by Provider  Allergies         ROS:  Constitutional, HEENT, cardiovascular, pulmonary, GI, , musculoskeletal, neuro, skin, endocrine and psych systems are negative, except as otherwise noted.      OBJECTIVE:   /70 (BP Location: Left arm, Patient Position: Chair, Cuff Size: Adult Large)  Pulse 76  Temp 97.8  F (36.6  C) (Oral)  Ht 4' 11\" (1.499 m)  Wt 189 lb 9.6 oz (86 kg)  SpO2 98%  BMI 38.29 kg/m2  Body mass index is 38.29 kg/(m^2).  GENERAL:  alert and no distress; here with    RESP: lungs clear to auscultation - no rales, rhonchi or wheezes  CV: regular rate and rhythm, normal S1 S2, no S3 or S4, no murmur, click or rub, no peripheral edema  ABDOMEN: soft, nontender, nand bowel sounds normal  MS: no gross musculoskeletal defects noted, no edema  NEURO: Normal strength and tone, mentation intact and speech normal  PSYCH: mentation appears normal, affect normal/bright    Diagnostic Test Results:  Reviewed labs     ASSESSMENT/PLAN:             1. Bloody nose  Needs eval for length of bleeding when occurring   Did not examine nose as do not want to restart bleeding and want to have ENT eval   - OTOLARYNGOLOGY REFERRAL    2. Need for prophylactic vaccination against Streptococcus pneumoniae (pneumococcus)    - PNEUMOCOCCAL VACCINE,ADULT,SQ OR IM    3. CARDIOVASCULAR " SCREENING; LDL GOAL LESS THAN 70      4. Diabetes mellitus type 2 in obese (H)  In good control with diet and exercise     5. Hypothyroidism, unspecified type  Lab stable     6. Iron deficiency anemia, unspecified iron deficiency anemia type  Stable     7. Vitamin D deficiency  Needs to take supplement       Patient Instructions   FHN: [facility] [phone]   [email or web address]  Call for an appointment     Take vitamin D 50,000 units WEEKLY for 8 weeks and then vitamin D 2000 units daily   Recheck labs in 4 months     Try vaseline to nose     6 month follow up if stable          [facility]

## 2017-12-06 NOTE — MR AVS SNAPSHOT
After Visit Summary   12/6/2017    Jennifer Fallon    MRN: [id number]           Patient Information     Date Of Birth          1955        Visit Information        Provider Department      12/6/2017 7:00 AM Kesha Rodas APRN CNP; [employer] [facility]        Today's Diagnoses     Bloody nose    -  1      Care Instructions    N: [facility] [phone]   [email or web address]  Call for an appointment     Take vitamin D 50,000 units WEEKLY for 8 weeks and then vitamin D 2000 units daily   Recheck labs in 4 months     Try vaseline to nose             Follow-ups after your visit        Additional Services     OTOLARYNGOLOGY REFERRAL       Your provider has referred you to: N: [facility] [phone]   [email or web address]    Please be aware that coverage of these services is subject to the terms and limitations of your health insurance plan.  Call member services at your health plan with any benefit or coverage questions.      Please bring the following with you to your appointment:    (1) Any X-Rays, CTs or MRIs which have been performed.  Contact the facility where they were done to arrange for  prior to your scheduled appointment.   (2) List of current medications  (3) This referral request   (4) Any documents/labs given to you for this referral                  Who to contact     If you have questions or need follow up information about today's clinic visit or your schedule please contact [facility] directly at [phone].  Normal or non-critical lab and imaging results will be communicated to you by MyChart, letter or phone within 4 business days after the clinic has received the results. If you do not hear from us within 7 days, please contact the clinic through MyChart or phone. If you have a critical or abnormal lab result,  "we will notify you by phone as soon as possible.  Submit refill requests through Asana or call your pharmacy and they will forward the refill request to us. Please allow 3 business days for your refill to be completed.          Additional Information About Your Visit        FanMobhart Information     Asana lets you send messages to your doctor, view your test results, renew your prescriptions, schedule appointments and more. To sign up, go to [email or web address] . Click on \"Log in\" on the left side of the screen, which will take you to the Welcome page. Then click on \"Sign up Now\" on the right side of the page.     You will be asked to enter the access code listed below, as well as some personal information. Please follow the directions to create your username and password.     Your access code is: [email or web address]  Expires: 2018  7:41 AM     Your access code will  in 90 days. If you need help or a new code, please call your [facility] clinic or [phone].        Care EveryWhere ID     This is your Care EveryWhere ID. This could be used by other organizations to access your [facility] medical records  [id number]        Your Vitals Were     Pulse Temperature Height Pulse Oximetry BMI (Body Mass Index)       76 97.8  F (36.6  C) (Oral) 4' 11\" (1.499 m) 98% 38.29 kg/m2        Blood Pressure from Last 3 Encounters:   17 114/70   17 115/67   17 110/60    Weight from Last 3 Encounters:   17 189 lb 9.6 oz (86 kg)   17 191 lb (86.6 kg)   17 191 lb (86.6 kg)              We Performed the Following     OTOLARYNGOLOGY REFERRAL        Primary Care Provider Office Phone # Fax #    ARINA Dee [phone]       [address] [facility] [address]        Equal Access to Services     MARYELLEN GARIBAY AH: Doreen Barbosa, adele moscoso, bonnie high la'aan ah. [facility] [phone].    ATENCIÓN: " Si habla cirspin, tiene a vaughan disposición servicios gratuitos de asistencia lingüística. Barbi collazo [phone].    We comply with applicable federal civil rights laws and [place] laws. We do not discriminate on the basis of race, color, national origin, age, disability, sex, sexual orientation, or gender identity.            Thank you!     Thank you for choosing [facility]  for your care. Our goal is always to provide you with excellent care. Hearing back from our patients is one way we can continue to improve our services. Please take a few minutes to complete the written survey that you may receive in the mail after your visit with us. Thank you!             Your Updated Medication List - Protect others around you: Learn how to safely use, store and throw away your medicines at www.disposemymeds.org.          This list is accurate as of: 12/6/17  7:38 AM.  Always use your most recent med list.                   Brand Name Dispense Instructions for use Diagnosis    blood glucose monitoring lancets     1 Box    Use to test blood sugar 1 times daily or as directed.    Diabetes mellitus type 2 in obese (H)       blood glucose monitoring meter device kit     1 kit    Use to test blood sugars 1 times daily or as directed.    Diabetes mellitus type 2 in obese (H)       blood glucose monitoring test strip    ACCU-CHEK ARGENIS    3 Box    Use to test blood sugars 1 times daily or as directed.    Diabetes mellitus type 2 in obese (H)       ferrous sulfate 325 (65 FE) MG tablet    IRON    30 tablet    Take 1 tablet (325 mg) by mouth daily (with breakfast)    Iron deficiency anemia, unspecified iron deficiency anemia type       ibuprofen 600 MG tablet    ADVIL/MOTRIN    60 tablet    Take 1 tablet (600 mg) by mouth 2 times daily as needed for moderate pain    Upper back pain       levothyroxine 112 MCG tablet    SYNTHROID/LEVOTHROID    90 tablet    Take 1 tablet (112 mcg) by mouth daily    Hypothyroidism,  unspecified type       order for DME     3 Month    Equipment being ordered: All DM testing supplies including test strips, lancets, for testing 2 times per day lambert mott    Diabetes mellitus type 2 in obese (H)       vitamin D 2000 UNITS tablet     100 tablet    Take 2,000 Units by mouth daily    Vitamin D deficiency       vitamin D 43264 UNIT capsule    ERGOCALCIFEROL    8 capsule    Take 1 capsule (50,000 Units) by mouth every 7 days for 8 doses    Vitamin D deficiency

## 2017-12-06 NOTE — PATIENT INSTRUCTIONS
FHN: [facility] [phone]   [email or web address]  Call for an appointment     Take vitamin D 50,000 units WEEKLY for 8 weeks and then vitamin D 2000 units daily   Recheck labs in 4 months     Try vaseline to nose     6 month follow up if stable

## 2018-04-06 DIAGNOSIS — E55.9 VITAMIN D DEFICIENCY: ICD-10-CM

## 2018-04-06 LAB — DEPRECATED CALCIDIOL+CALCIFEROL SERPL-MC: 14 UG/L (ref 20–75)

## 2018-04-06 PROCEDURE — 82306 VITAMIN D 25 HYDROXY: CPT | Performed by: NURSE PRACTITIONER

## 2018-04-06 PROCEDURE — 36415 COLL VENOUS BLD VENIPUNCTURE: CPT | Performed by: NURSE PRACTITIONER

## 2018-04-06 PROCEDURE — T1013 SIGN LANG/ORAL INTERPRETER: HCPCS | Mod: U3 | Performed by: NURSE PRACTITIONER

## 2018-04-10 DIAGNOSIS — E55.9 VITAMIN D DEFICIENCY: Primary | ICD-10-CM

## 2018-04-10 RX ORDER — ERGOCALCIFEROL 1.25 MG/1
50000 CAPSULE, LIQUID FILLED ORAL
Qty: 8 CAPSULE | Refills: 0 | Status: SHIPPED | OUTPATIENT
Start: 2018-04-10 | End: 2018-05-30

## 2018-05-03 DIAGNOSIS — E03.9 HYPOTHYROIDISM, UNSPECIFIED TYPE: ICD-10-CM

## 2018-05-03 RX ORDER — LEVOTHYROXINE SODIUM 112 UG/1
112 TABLET ORAL DAILY
Qty: 90 TABLET | Refills: 3 | Status: SHIPPED | OUTPATIENT
Start: 2018-05-03 | End: 2019-05-07

## 2018-05-03 NOTE — TELEPHONE ENCOUNTER
Patient calling via .  Requesting Levothyroxine 112 mcg refill as she only has 1 pill left.  Last TSH elevated, last office visit 12/6/17     TSH   Date Value Ref Range Status   11/30/2017 5.15 (H) 0.40 - 4.00 mU/L Final   06/12/2017 3.34 0.40 - 4.00 mU/L Final   04/19/2017 4.26 (H) 0.40 - 4.00 mU/L Final   03/01/2017 7.59 (H) 0.40 - 4.00 mU/L Final   12/01/2016 8.11 (H) 0.40 - 4.00 mU/L Final     No need to call pt back unless there is a problem.  She will check with pharmacy later this afternoon.  ELVER Perdomo R.N.

## 2018-06-06 ENCOUNTER — OFFICE VISIT (OUTPATIENT)
Dept: INTERNAL MEDICINE | Facility: CLINIC | Age: 63
End: 2018-06-06
Payer: COMMERCIAL

## 2018-06-06 VITALS
DIASTOLIC BLOOD PRESSURE: 72 MMHG | HEIGHT: 59 IN | TEMPERATURE: 98.3 F | WEIGHT: 204 LBS | RESPIRATION RATE: 16 BRPM | SYSTOLIC BLOOD PRESSURE: 128 MMHG | BODY MASS INDEX: 41.12 KG/M2 | OXYGEN SATURATION: 96 % | HEART RATE: 69 BPM

## 2018-06-06 DIAGNOSIS — E11.69 DIABETES MELLITUS TYPE 2 IN OBESE: ICD-10-CM

## 2018-06-06 DIAGNOSIS — E03.9 HYPOTHYROIDISM, UNSPECIFIED TYPE: ICD-10-CM

## 2018-06-06 DIAGNOSIS — E66.9 DIABETES MELLITUS TYPE 2 IN OBESE: ICD-10-CM

## 2018-06-06 DIAGNOSIS — D50.9 IRON DEFICIENCY ANEMIA, UNSPECIFIED IRON DEFICIENCY ANEMIA TYPE: ICD-10-CM

## 2018-06-06 DIAGNOSIS — E55.9 VITAMIN D DEFICIENCY: ICD-10-CM

## 2018-06-06 DIAGNOSIS — M25.561 RIGHT KNEE PAIN, UNSPECIFIED CHRONICITY: Primary | ICD-10-CM

## 2018-06-06 LAB
BASOPHILS # BLD AUTO: 0 10E9/L (ref 0–0.2)
BASOPHILS NFR BLD AUTO: 0.2 %
DIFFERENTIAL METHOD BLD: ABNORMAL
EOSINOPHIL # BLD AUTO: 0.2 10E9/L (ref 0–0.7)
EOSINOPHIL NFR BLD AUTO: 2.3 %
ERYTHROCYTE [DISTWIDTH] IN BLOOD BY AUTOMATED COUNT: 15.4 % (ref 10–15)
HBA1C MFR BLD: 5.8 % (ref 0–5.6)
HCT VFR BLD AUTO: 37.8 % (ref 35–47)
HGB BLD-MCNC: 11.9 G/DL (ref 11.7–15.7)
LYMPHOCYTES # BLD AUTO: 2 10E9/L (ref 0.8–5.3)
LYMPHOCYTES NFR BLD AUTO: 23 %
MCH RBC QN AUTO: 27.2 PG (ref 26.5–33)
MCHC RBC AUTO-ENTMCNC: 31.5 G/DL (ref 31.5–36.5)
MCV RBC AUTO: 87 FL (ref 78–100)
MONOCYTES # BLD AUTO: 0.6 10E9/L (ref 0–1.3)
MONOCYTES NFR BLD AUTO: 7.5 %
NEUTROPHILS # BLD AUTO: 5.7 10E9/L (ref 1.6–8.3)
NEUTROPHILS NFR BLD AUTO: 67 %
PLATELET # BLD AUTO: 239 10E9/L (ref 150–450)
RBC # BLD AUTO: 4.37 10E12/L (ref 3.8–5.2)
WBC # BLD AUTO: 8.5 10E9/L (ref 4–11)

## 2018-06-06 PROCEDURE — 36415 COLL VENOUS BLD VENIPUNCTURE: CPT | Performed by: NURSE PRACTITIONER

## 2018-06-06 PROCEDURE — 84443 ASSAY THYROID STIM HORMONE: CPT | Performed by: NURSE PRACTITIONER

## 2018-06-06 PROCEDURE — 80053 COMPREHEN METABOLIC PANEL: CPT | Performed by: NURSE PRACTITIONER

## 2018-06-06 PROCEDURE — 99214 OFFICE O/P EST MOD 30 MIN: CPT | Performed by: NURSE PRACTITIONER

## 2018-06-06 PROCEDURE — 85025 COMPLETE CBC W/AUTO DIFF WBC: CPT | Performed by: NURSE PRACTITIONER

## 2018-06-06 PROCEDURE — 83036 HEMOGLOBIN GLYCOSYLATED A1C: CPT | Performed by: NURSE PRACTITIONER

## 2018-06-06 PROCEDURE — T1013 SIGN LANG/ORAL INTERPRETER: HCPCS | Mod: U3 | Performed by: NURSE PRACTITIONER

## 2018-06-06 PROCEDURE — 82306 VITAMIN D 25 HYDROXY: CPT | Performed by: NURSE PRACTITIONER

## 2018-06-06 NOTE — MR AVS SNAPSHOT
After Visit Summary   6/6/2018    Jennifer Fallon    MRN: [id number]           Patient Information     Date Of Birth          1955        Visit Information        Provider Department      6/6/2018 8:15 AM Wilfredo Caba Lori Marie, APRN CNP [facility]        Today's Diagnoses     Right knee pain, unspecified chronicity    -  1    Diabetes mellitus type 2 in obese (H)        Hypothyroidism, unspecified type        Vitamin D deficiency        Iron deficiency anemia, unspecified iron deficiency anemia type          Care Instructions    Lab in [address]    Ultrasound right leg  [address]   [address]  6/7/2018  130 pm   Arrive 115 pm           Follow-ups after your visit        Your next 10 appointments already scheduled     Jun 07, 2018  1:30 PM CDT   US LOWER EXTREMITY VENOUS DUPLEX RIGHT with [id number]   [facility] ([facility])    [address]  [facility] [address] [phone]           Please bring a list of your medicines (including vitamins, minerals and over-the-counter drugs). Also, tell your doctor about any allergies you may have. Wear comfortable clothes and leave your valuables at home.  You do not need to do anything special to prepare for your exam.  Please call the Imaging Department at your exam site with any questions.              Future tests that were ordered for you today     Open Future Orders        Priority Expected Expires Ordered    US Lower Extremity Venous Duplex Right Routine  6/6/2019 6/6/2018            Who to contact     If you have questions or need follow up information about today's clinic visit or your schedule please contact [facility] directly at [phone].  Normal or non-critical lab and imaging results will be communicated to you by MyChart, letter or phone within 4 business days after the clinic has received the results. If  "you do not hear from us within 7 days, please contact the clinic through introNetworks or phone. If you have a critical or abnormal lab result, we will notify you by phone as soon as possible.  Submit refill requests through introNetworks or call your pharmacy and they will forward the refill request to us. Please allow 3 business days for your refill to be completed.          Additional Information About Your Visit        VistronixharMamaBear App Information     introNetworks lets you send messages to your doctor, view your test results, renew your prescriptions, schedule appointments and more. To sign up, go to [email or web address] . Click on \"Log in\" on the left side of the screen, which will take you to the Welcome page. Then click on \"Sign up Now\" on the right side of the page.     You will be asked to enter the access code listed below, as well as some personal information. Please follow the directions to create your username and password.     Your access code is: [email or web address]  Expires: 2018  9:00 AM     Your access code will  in 90 days. If you need help or a new code, please call your [facility] clinic or [phone].        Care EveryWhere ID     This is your Care EveryWhere ID. This could be used by other organizations to access your [facility] medical records  [id number]        Your Vitals Were     Pulse Temperature Respirations Height Pulse Oximetry BMI (Body Mass Index)    69 98.3  F (36.8  C) (Oral) 16 4' 11\" (1.499 m) 96% 41.2 kg/m2       Blood Pressure from Last 3 Encounters:   18 128/72   17 114/70   17 115/67    Weight from Last 3 Encounters:   18 204 lb (92.5 kg)   17 189 lb 9.6 oz (86 kg)   17 191 lb (86.6 kg)              We Performed the Following     CBC with platelets differential     Comprehensive metabolic panel     Hemoglobin A1c     TSH with free T4 reflex     Vitamin D Deficiency          Where to get your medicines      These medications were sent to [facility] " [id number] - [place], [place] - [address]  [address], [facility] [address]     Phone:  [phone]     blood glucose monitoring test strip          Primary Care Provider Office Phone # Fax #    ARINA Dee [phone]       [address] [facility] [address]        Equal Access to Services     MARYELLEN GARIBAY : Hadii aad ku hadasho Soomaali, waaxda luqadaha, qaybta kaalmada adeegyada, waxay idiin hayaan adeeg kharash la'radha . So [facility] [phone].    ATENCIÓN: Si habla español, tiene a vaughan disposición servicios gratuitos de asistencia lingüística. Llame al [phone].    We comply with applicable federal civil rights laws and [place] laws. We do not discriminate on the basis of race, color, national origin, age, disability, sex, sexual orientation, or gender identity.            Thank you!     Thank you for choosing [facility]  for your care. Our goal is always to provide you with excellent care. Hearing back from our patients is one way we can continue to improve our services. Please take a few minutes to complete the written survey that you may receive in the mail after your visit with us. Thank you!             Your Updated Medication List - Protect others around you: Learn how to safely use, store and throw away your medicines at www.disposemymeds.org.          This list is accurate as of 6/6/18  9:00 AM.  Always use your most recent med list.                   Brand Name Dispense Instructions for use Diagnosis    blood glucose monitoring lancets     1 Box    Use to test blood sugar 1 times daily or as directed.    Diabetes mellitus type 2 in obese (H)       blood glucose monitoring meter device kit     1 kit    Use to test blood sugars 1 times daily or as directed.    Diabetes mellitus type 2 in obese (H)       blood glucose monitoring test strip    ACCU-CHEK ARGENIS    3 Box    Use to test blood sugars 1 times  daily or as directed.    Diabetes mellitus type 2 in obese (H)       ibuprofen 600 MG tablet    ADVIL/MOTRIN    60 tablet    Take 1 tablet (600 mg) by mouth 2 times daily as needed for moderate pain    Upper back pain       levothyroxine 112 MCG tablet    SYNTHROID/LEVOTHROID    90 tablet    Take 1 tablet (112 mcg) by mouth daily    Hypothyroidism, unspecified type       vitamin D 2000 units tablet     100 tablet    Take 2,000 Units by mouth daily    Vitamin D deficiency

## 2018-06-06 NOTE — PROGRESS NOTES
.  SUBJECTIVE:   Jennifer Fallon is a 62 year old female who presents to clinic today for the following health issues:      Diabetes Follow-up      Patient is checking blood sugars: 2-3  times a week highest 110- usually less than 100      Diabetic concerns: None     Symptoms of hypoglycemia (low blood sugar): none     Paresthesias (numbness or burning in feet) or sores: Yes some pain in rt leg     Date of last diabetic eye exam: more than 3 years.pt appointment on 27 th of this month     BP Readings from Last 2 Encounters:   06/06/18 128/72   12/06/17 114/70     Hemoglobin A1C (%)   Date Value   06/06/2018 5.8 (H)   11/30/2017 6.2 (H)     LDL Cholesterol Calculated (mg/dL)   Date Value   11/30/2017 86   06/12/2017 72     Hypothyroidism Follow-up      Since last visit, patient describes the following symptoms: weight gain, constipation and fatigue      Amount of exercise or physical activity: None    Problems taking medications regularly: No    Medication side effects: none    Diet: low fat/cholesterol and diabetic        Right knee pain behind knee   After dancing 2 weeks ago     Ibuprofen helped - morning and night -200 mg dose   Exercise in [employer] and pool     Could not even walk well since pain start     Knee pain is not present   Feels like lump upper inner right thigh   Massage and it went away     Worse pain in back of knee   Stretching and feels tight behind knee         Problem list and histories reviewed & adjusted, as indicated.  Additional history: as documented    Patient Active Problem List   Diagnosis     Advanced directives, counseling/discussion     CARDIOVASCULAR SCREENING; LDL GOAL LESS THAN 70     Postmenopausal status     Bilateral ankle pain     Dermatophytosis of nail     Diabetes mellitus type 2 in obese (H)     Hypothyroidism, unspecified type     Iron deficiency anemia, unspecified iron deficiency anemia type     Vitamin D deficiency     Past Surgical History:   Procedure Laterality  "Date     CHOLECYSTECTOMY  2004     COLONOSCOPY Left 6/23/2017    Procedure: COMBINED COLONOSCOPY, SINGLE OR MULTIPLE BIOPSY/POLYPECTOMY BY BIOPSY;  COLONOSCOPY with polypectomy of colon polyp by cold biopsy  ;  Surgeon: Chang Beasley MD;  Location:  GI     TUBAL LIGATION  1987       Social History   Substance Use Topics     Smoking status: Never Smoker     Smokeless tobacco: Never Used     Alcohol use 0.0 oz/week     0 Standard drinks or equivalent per week      Comment: Socially     Family History   Problem Relation Age of Onset     DIABETES Brother      DIABETES Brother      Coronary Artery Disease Mother      Breast Cancer No family hx of      Colon Cancer No family hx of      Prostate Cancer No family hx of      Other Cancer No family hx of            Reviewed and updated as needed this visit by clinical staff  Tobacco  Allergies  Meds  Med Hx  Surg Hx  Fam Hx  Soc Hx      Reviewed and updated as needed this visit by Provider         ROS:  Constitutional, HEENT, cardiovascular, pulmonary, gi and gu systems are negative, except as otherwise noted.    OBJECTIVE:     /72 (BP Location: Left arm, Patient Position: Sitting, Cuff Size: Adult Large)  Pulse 69  Temp 98.3  F (36.8  C) (Oral)  Resp 16  Ht 4' 11\" (1.499 m)  Wt 204 lb (92.5 kg)  SpO2 96%  BMI 41.2 kg/m2  Body mass index is 41.2 kg/(m^2).  GENERAL:  alert and no distress  RESP: lungs clear to auscultation - no rales, rhonchi or wheezes  CV: regular rate and rhythm  ABDOMEN: soft, nontender,  and bowel sounds normal  MS: some fullness behind right leg which causes pain- possible baker cyst   NEURO: Normal strength and tone, mentation intact and speech normal  PSYCH: mentation appears normal, affect normal/bright    Diagnostic Test Results:  Labs     ASSESSMENT/PLAN:             1. Diabetes mellitus type 2 in obese (H)  In good range   - blood glucose monitoring (ACCU-CHEK ARGENIS) test strip; Use to test blood sugars 1 times daily or " as directed.  Dispense: 3 Box; Refill: 1  - Hemoglobin A1c  - Comprehensive metabolic panel    2. Right knee pain, unspecified chronicity  Probable bakers cyst  - US Lower Extremity Venous Duplex Right; Future  - ORTHO  REFERRAL    3. Hypothyroidism, unspecified type    - TSH with free T4 reflex    4. Vitamin D deficiency    - Vitamin D Deficiency    5. Iron deficiency anemia, unspecified iron deficiency anemia type    - CBC with platelets differential    Patient Instructions   Lab in [address]    Ultrasound right leg  [address]   [address]  6/7/2018  130 pm   Arrive 115 pm       ARINA Dee CNP  [facility]

## 2018-06-06 NOTE — NURSING NOTE
"Chief Complaint   Patient presents with     Diabetes     fasting     initial /72 (BP Location: Left arm, Patient Position: Sitting, Cuff Size: Adult Large)  Pulse 69  Temp 98.3  F (36.8  C) (Oral)  Resp 16  Ht 4' 11\" (1.499 m)  Wt 204 lb (92.5 kg)  SpO2 96%  BMI 41.2 kg/m2 Estimated body mass index is 41.2 kg/(m^2) as calculated from the following:    Height as of this encounter: 4' 11\" (1.499 m).    Weight as of this encounter: 204 lb (92.5 kg)..  bp completed using cuff size large  GADIEL VERDE LPN  "

## 2018-06-06 NOTE — PATIENT INSTRUCTIONS
Lab in [address]    Ultrasound right leg  [address] [facility]   [address]  6/7/2018  130 pm   Arrive 115 pm

## 2018-06-07 ENCOUNTER — HOSPITAL ENCOUNTER (OUTPATIENT)
Dept: ULTRASOUND IMAGING | Facility: CLINIC | Age: 63
Discharge: HOME | End: 2018-06-07
Attending: NURSE PRACTITIONER | Admitting: NURSE PRACTITIONER
Payer: COMMERCIAL

## 2018-06-07 DIAGNOSIS — M25.561 RIGHT KNEE PAIN, UNSPECIFIED CHRONICITY: ICD-10-CM

## 2018-06-07 LAB
ALBUMIN SERPL-MCNC: 3.7 G/DL (ref 3.4–5)
ALP SERPL-CCNC: 101 U/L (ref 40–150)
ALT SERPL W P-5'-P-CCNC: 27 U/L (ref 0–50)
ANION GAP SERPL CALCULATED.3IONS-SCNC: 9 MMOL/L (ref 3–14)
AST SERPL W P-5'-P-CCNC: 21 U/L (ref 0–45)
BILIRUB SERPL-MCNC: 0.4 MG/DL (ref 0.2–1.3)
BUN SERPL-MCNC: 20 MG/DL (ref 7–30)
CALCIUM SERPL-MCNC: 8.8 MG/DL (ref 8.5–10.1)
CHLORIDE SERPL-SCNC: 107 MMOL/L (ref 94–109)
CO2 SERPL-SCNC: 25 MMOL/L (ref 20–32)
CREAT SERPL-MCNC: 0.44 MG/DL (ref 0.52–1.04)
DEPRECATED CALCIDIOL+CALCIFEROL SERPL-MC: 22 UG/L (ref 20–75)
GFR SERPL CREATININE-BSD FRML MDRD: >90 ML/MIN/1.7M2
GLUCOSE SERPL-MCNC: 105 MG/DL (ref 70–99)
POTASSIUM SERPL-SCNC: 4.4 MMOL/L (ref 3.4–5.3)
PROT SERPL-MCNC: 7.9 G/DL (ref 6.8–8.8)
SODIUM SERPL-SCNC: 141 MMOL/L (ref 133–144)
TSH SERPL DL<=0.005 MIU/L-ACNC: 3.66 MU/L (ref 0.4–4)

## 2018-06-07 PROCEDURE — 93971 EXTREMITY STUDY: CPT | Mod: RT

## 2018-10-18 ENCOUNTER — HOSPITAL ENCOUNTER (OUTPATIENT)
Dept: MAMMOGRAPHY | Facility: CLINIC | Age: 63
Discharge: HOME | End: 2018-10-18
Attending: NURSE PRACTITIONER | Admitting: NURSE PRACTITIONER
Payer: COMMERCIAL

## 2018-10-18 ENCOUNTER — ALLIED HEALTH/NURSE VISIT (OUTPATIENT)
Dept: NURSING | Facility: CLINIC | Age: 63
End: 2018-10-18
Payer: COMMERCIAL

## 2018-10-18 DIAGNOSIS — Z12.31 VISIT FOR SCREENING MAMMOGRAM: ICD-10-CM

## 2018-10-18 DIAGNOSIS — Z23 NEED FOR PROPHYLACTIC VACCINATION AND INOCULATION AGAINST INFLUENZA: Primary | ICD-10-CM

## 2018-10-18 PROCEDURE — 77067 SCR MAMMO BI INCL CAD: CPT

## 2018-10-18 PROCEDURE — 90682 RIV4 VACC RECOMBINANT DNA IM: CPT

## 2018-10-18 PROCEDURE — 90471 IMMUNIZATION ADMIN: CPT

## 2018-10-18 NOTE — PROGRESS NOTES

## 2018-10-18 NOTE — MR AVS SNAPSHOT
After Visit Summary   10/18/2018    Jennifer Fallon    MRN: [id number]           Patient Information     Date Of Birth          1955        Visit Information        Provider Department      10/18/2018 10:30 AM RI IM NURSE [facility]        Today's Diagnoses     Need for prophylactic vaccination and inoculation against influenza    -  1       Follow-ups after your visit        Your next 10 appointments already scheduled     Oct 18, 2018 10:30 AM CDT   Nurse Only with [facility]    [address]  [facility] [id number] [phone]            Oct 31, 2018  9:15 AM CDT   Office Visit with Sudhakar Silver MD   [facility]    [address]  [address]  [facility] [address] [phone]           Bring a current list of meds and any records pertaining to this visit. For Physicals, please bring immunization records and any forms needing to be filled out. Please arrive 10 minutes early to complete paperwork.              Who to contact     If you have questions or need follow up information about today's clinic visit or your schedule please contact [facility] directly at [phone].  Normal or non-critical lab and imaging results will be communicated to you by MyChart, letter or phone within 4 business days after the clinic has received the results. If you do not hear from us within 7 days, please contact the clinic through [email or web address] or phone. If you have a critical or abnormal lab result, we will notify you by phone as soon as possible.  Submit refill requests through [employer] or call your pharmacy and they will forward the refill request to us. Please allow 3 business days for your refill to be completed.          Additional Information About Your Visit        [employer] Information     [employer] lets you send messages to your  "doctor, view your test results, renew your prescriptions, schedule appointments and more. To sign up, go to [email or web address] . Click on \"Log in\" on the left side of the screen, which will take you to the Welcome page. Then click on \"Sign up Now\" on the right side of the page.     You will be asked to enter the access code listed below, as well as some personal information. Please follow the directions to create your username and password.     Your access code is: [email or web address]  Expires: 2019 10:14 AM     Your access code will  in 90 days. If you need help or a new code, please call your [place] clinic or [phone].        Care EveryWhere ID     This is your Care EveryWhere ID. This could be used by other organizations to access your [place] medical records  [id number]         Blood Pressure from Last 3 Encounters:   18 128/72   17 114/70   17 115/67    Weight from Last 3 Encounters:   18 204 lb (92.5 kg)   17 189 lb 9.6 oz (86 kg)   17 191 lb (86.6 kg)              We Performed the Following     FLU VAC, QUADRIVALENT (RIV4) RECOMBINANT DNA, IM     Vaccine Administration, Initial [[id number]]        Primary Care Provider Office Phone # Fax #    ARINA Dee [facility] [phone]       [address] [facility] [address] [facility] [address]        Equal Access to Services     [facility] AH: Hadii aad ku hadasho Soomaali, waaxda luqadaha, qaybta kaalmada adeegyada, waxay delmi hamm . So [facility] [phone].    ATENCIÓN: Si shamikala crispin, tiene a vaughan disposición servicios gratuitos de asistencia lingüística. Llame al [phone].    We comply with applicable federal civil rights laws and [place] laws. We do not discriminate on the basis of race, color, national origin, age, disability, sex, sexual orientation, or gender identity.            Thank you!     Thank you for choosing [facility]  for your care. Our goal is " always to provide you with excellent care. Hearing back from our patients is one way we can continue to improve our services. Please take a few minutes to complete the written survey that you may receive in the mail after your visit with us. Thank you!             Your Updated Medication List - Protect others around you: Learn how to safely use, store and throw away your medicines at www.disposemymeds.org.          This list is accurate as of 10/18/18 10:14 AM.  Always use your most recent med list.                   Brand Name Dispense Instructions for use Diagnosis    blood glucose monitoring lancets     1 Box    Use to test blood sugar 1 times daily or as directed.    Diabetes mellitus type 2 in obese (H)       blood glucose monitoring meter device kit     1 kit    Use to test blood sugars 1 times daily or as directed.    Diabetes mellitus type 2 in obese (H)       blood glucose monitoring test strip    ACCU-CHEK ARGENIS    3 Box    Use to test blood sugars 1 times daily or as directed.    Diabetes mellitus type 2 in obese (H)       ibuprofen 600 MG tablet    ADVIL/MOTRIN    60 tablet    Take 1 tablet (600 mg) by mouth 2 times daily as needed for moderate pain    Upper back pain       levothyroxine 112 MCG tablet    SYNTHROID/LEVOTHROID    90 tablet    Take 1 tablet (112 mcg) by mouth daily    Hypothyroidism, unspecified type       vitamin D 2000 units tablet     100 tablet    Take 2,000 Units by mouth daily    Vitamin D deficiency

## 2018-10-31 ENCOUNTER — OFFICE VISIT (OUTPATIENT)
Dept: OBGYN | Facility: CLINIC | Age: 63
End: 2018-10-31
Payer: COMMERCIAL

## 2018-10-31 VITALS
BODY MASS INDEX: 41.37 KG/M2 | HEIGHT: 59 IN | SYSTOLIC BLOOD PRESSURE: 116 MMHG | HEART RATE: 76 BPM | DIASTOLIC BLOOD PRESSURE: 70 MMHG | WEIGHT: 205.2 LBS

## 2018-10-31 DIAGNOSIS — Z12.4 SCREENING FOR CERVICAL CANCER: ICD-10-CM

## 2018-10-31 DIAGNOSIS — E66.01 MORBID OBESITY (H): ICD-10-CM

## 2018-10-31 DIAGNOSIS — Z12.31 VISIT FOR SCREENING MAMMOGRAM: ICD-10-CM

## 2018-10-31 DIAGNOSIS — Z01.419 ENCOUNTER FOR GYNECOLOGICAL EXAMINATION WITHOUT ABNORMAL FINDING: Primary | ICD-10-CM

## 2018-10-31 PROCEDURE — 87624 HPV HI-RISK TYP POOLED RSLT: CPT | Performed by: OBSTETRICS & GYNECOLOGY

## 2018-10-31 PROCEDURE — G0145 SCR C/V CYTO,THINLAYER,RESCR: HCPCS | Performed by: OBSTETRICS & GYNECOLOGY

## 2018-10-31 PROCEDURE — 99386 PREV VISIT NEW AGE 40-64: CPT | Performed by: OBSTETRICS & GYNECOLOGY

## 2018-10-31 NOTE — MR AVS SNAPSHOT
"              After Visit Summary   10/31/2018    Jennifer Fallon    MRN: [id number]           Patient Information     Date Of Birth          1955        Visit Information        Provider Department      10/31/2018 9:00 AM Sudhakar Silver MD; [employer] [facility]        Today's Diagnoses     Encounter for gynecological examination without abnormal finding    -  1    Screening for cervical cancer        Visit for screening mammogram        Morbid obesity (H)           Follow-ups after your visit        Future tests that were ordered for you today     Open Future Orders        Priority Expected Expires Ordered    *MA Screening Digital Bilateral Routine  10/31/2019 10/31/2018            Who to contact     If you have questions or need follow up information about today's clinic visit or your schedule please contact [facility] directly at [phone].  Normal or non-critical lab and imaging results will be communicated to you by MyChart, letter or phone within 4 business days after the clinic has received the results. If you do not hear from us within 7 days, please contact the clinic through MyChart or phone. If you have a critical or abnormal lab result, we will notify you by phone as soon as possible.  Submit refill requests through [employer] or call your pharmacy and they will forward the refill request to us. Please allow 3 business days for your refill to be completed.          Additional Information About Your Visit        [employer] Information     [employer] lets you send messages to your doctor, view your test results, renew your prescriptions, schedule appointments and more. To sign up, go to [email or web address] . Click on \"Log in\" on the left side of the screen, which will take you to the Welcome page. Then click on \"Sign up Now\" on the right side of the page.     You will be asked to enter the access code listed below, as well as some " "personal information. Please follow the directions to create your username and password.     Your access code is: [email or web address]  Expires: 2019 10:14 AM     Your access code will  in 90 days. If you need help or a new code, please call your [place] clinic or [phone].        Care EveryWhere ID     This is your Care EveryWhere ID. This could be used by other organizations to access your [place] medical records  [id number]        Your Vitals Were     Pulse Height BMI (Body Mass Index)             76 4' 10.75\" (1.492 m) 41.8 kg/m2          Blood Pressure from Last 3 Encounters:   10/31/18 116/70   18 128/72   17 114/70    Weight from Last 3 Encounters:   10/31/18 205 lb 3.2 oz (93.1 kg)   18 204 lb (92.5 kg)   17 189 lb 9.6 oz (86 kg)              We Performed the Following     HPV High Risk Types DNA Cervical     Pap imaged thin layer screen with HPV - recommended age 30 - 65 years (select HPV order below)        Primary Care Provider Office Phone # Fax #    ARINA Dee [facility] [phone]       [address] [facility] [address]        Equal Access to Services     MARYELLEN GARIBAY : Hadii sally ku hadasho Soomaali, waaxda luqadaha, qaybta kaalmada adeegyada, bonnie awad hayradha anderson khstephania hamm . So [facility] [phone].    ATENCIÓN: Si habla español, tiene a vaughan disposición servicios gratuitos de asistencia lingüística. Llame al [phone].    We comply with applicable federal civil rights laws and [place] laws. We do not discriminate on the basis of race, color, national origin, age, disability, sex, sexual orientation, or gender identity.            Thank you!     Thank you for choosing [facility]  for your care. Our goal is always to provide you with excellent care. Hearing back from our patients is one way we can continue to improve our services. Please take a few minutes to complete the written survey that you may receive in the " mail after your visit with us. Thank you!             Your Updated Medication List - Protect others around you: Learn how to safely use, store and throw away your medicines at www.disposemymeds.org.          This list is accurate as of 10/31/18  2:28 PM.  Always use your most recent med list.                   Brand Name Dispense Instructions for use Diagnosis    blood glucose monitoring lancets     1 Box    Use to test blood sugar 1 times daily or as directed.    Diabetes mellitus type 2 in obese (H)       blood glucose monitoring meter device kit     1 kit    Use to test blood sugars 1 times daily or as directed.    Diabetes mellitus type 2 in obese (H)       blood glucose monitoring test strip    ACCU-CHEK ARGENIS    3 Box    Use to test blood sugars 1 times daily or as directed.    Diabetes mellitus type 2 in obese (H)       ibuprofen 600 MG tablet    ADVIL/MOTRIN    60 tablet    Take 1 tablet (600 mg) by mouth 2 times daily as needed for moderate pain    Upper back pain       levothyroxine 112 MCG tablet    SYNTHROID/LEVOTHROID    90 tablet    Take 1 tablet (112 mcg) by mouth daily    Hypothyroidism, unspecified type       vitamin D 2000 units tablet     100 tablet    Take 2,000 Units by mouth daily    Vitamin D deficiency

## 2018-10-31 NOTE — NURSING NOTE
"Chief Complaint   Patient presents with     Physical   No concerns  Here with     Initial /70  Pulse 76  Ht 4' 10.75\" (1.492 m)  Wt 205 lb 3.2 oz (93.1 kg)  BMI 41.8 kg/m2 Estimated body mass index is 41.8 kg/(m^2) as calculated from the following:    Height as of this encounter: 4' 10.75\" (1.492 m).    Weight as of this encounter: 205 lb 3.2 oz (93.1 kg).  BP completed using cuff size: regular    Questioned patient about current smoking habits.  Pt. has never smoked.          The following HM Due: mammogram  pap smear      Isela Stokes CMA                                   "

## 2018-10-31 NOTE — LETTER
November 9, 2018    Jennifer Fallon  [address]    [facility] [address]    Dear Jennifer,  We are happy to inform you that your PAP smear result from 10/31/18 is normal.  We are now able to do a follow up test on PAP smears. The DNA test is for HPV (Human Papilloma Virus). Cervical cancer is closely linked with certain types of HPV. Your results showed no evidence of high risk HPV.  Therefore we recommend you return in 3 years for your next pap smear and HPV test.  You will still need to return to the clinic every year for an annual exam and other preventive tests.  If you have additional questions regarding this result, please call our registered nurse, Viktoriya at [phone].  Sincerely,    Sudhakar Silver MD/[facility]

## 2018-10-31 NOTE — PROGRESS NOTES
"Jennifer is a 63 year old  who presents for annual exam.  present for interview and exam.  Postmenopausal.  She is having no menopausal symptoms. No vaginal bleeding noted.     Besides routine health maintenance, she has no other health concerns today .  GYNECOLOGIC HISTORY:  She is not sexually active   History sexually transmitted infections:No STD history  Estrogen replacement therapy: No    History of abnormal Pap smear: Patient states approx 6 years ago she had abnormal Pap followed up with \"additional testing\" She does not endorse ablative therapy being performed.  Family history of breast CA: No  Family history of uterine/ovarian CA: No      HEALTH MAINTENANCE:  Reviewed.    HISTORY:  Obstetric History       T0      L4     SAB0   TAB0   Ectopic0   Multiple0   Live Births4       # Outcome Date GA Lbr Damon/2nd Weight Sex Delivery Anes PTL Lv   4          DARI   3          DARI   2          DARI   1          DARI        Past Medical History:   Diagnosis Date     CARDIOVASCULAR SCREENING; LDL GOAL LESS THAN 130      Constipation      Diabetes (H) diagnosed     controlled with diet only; no meds.     Diarrhea      Hypothyroidism      Past Surgical History:   Procedure Laterality Date     CHOLECYSTECTOMY  2004     COLONOSCOPY Left 2017    Procedure: COMBINED COLONOSCOPY, SINGLE OR MULTIPLE BIOPSY/POLYPECTOMY BY BIOPSY;  COLONOSCOPY with polypectomy of colon polyp by cold biopsy  ;  Surgeon: Chang Beasley MD;  Location: [facility]     TUBAL LIGATION       Family History   Problem Relation Age of Onset     Diabetes Brother      Diabetes Brother      Coronary Artery Disease Mother      Breast Cancer No family hx of      Colon Cancer No family hx of      Prostate Cancer No family hx of      Other Cancer No family hx of      Social History     Social History     Marital status:      Spouse name: N/A     Number of children: N/A     Years of " "education: N/A     Social History Main Topics     Smoking status: Never Smoker     Smokeless tobacco: Never Used     Alcohol use 0.0 oz/week     0 Standard drinks or equivalent per week      Comment: Socially     Drug use: No     Sexual activity: Yes     Other Topics Concern     None     Social History Narrative       Current Outpatient Prescriptions:      blood glucose monitoring (ACCU-CHEK ARGENIS PLUS) meter device kit, Use to test blood sugars 1 times daily or as directed., Disp: 1 kit, Rfl: 0     blood glucose monitoring (ACCU-CHEK ARGENIS) test strip, Use to test blood sugars 1 times daily or as directed., Disp: 3 Box, Rfl: 1     blood glucose monitoring (ACCU-CHEK MULTICLIX) lancets, Use to test blood sugar 1 times daily or as directed., Disp: 1 Box, Rfl: 0     ibuprofen (ADVIL/MOTRIN) 600 MG tablet, Take 1 tablet (600 mg) by mouth 2 times daily as needed for moderate pain, Disp: 60 tablet, Rfl: 1     levothyroxine (SYNTHROID/LEVOTHROID) 112 MCG tablet, Take 1 tablet (112 mcg) by mouth daily, Disp: 90 tablet, Rfl: 3     Cholecalciferol (VITAMIN D) 2000 UNITS tablet, Take 2,000 Units by mouth daily (Patient not taking: Reported on 10/31/2018), Disp: 100 tablet, Rfl: 3  No Known Allergies    Past medical, surgical, social and family history were reviewed and updated in EPIC.    ROS:  12 point review of systems negative other than symptoms noted below.      EXAM:  /70  Pulse 76  Ht 4' 10.75\" (1.492 m)  Wt 205 lb 3.2 oz (93.1 kg)  BMI 41.8 kg/m2   BMI: Body mass index is 41.8 kg/(m^2).  Constitutional: healthy, alert and no distress  Head: Normocephalic. No masses, lesions, tenderness or abnormalities  Neck: Neck supple. Trachea midline. No adenopathy. Thyroid symmetric, normal size.      Breast: No nodularity, asymmetry or nipple discharge bilaterally.  Gastrointestinal: Abdomen soft, non-tender, non-distended. No masses, organomegaly  Vulva:  No external lesions, normal female hair distribution, no " inguinal adenopathy.    Urethra:  Midline, non-tender, well supported, no discharge  Vagina:  Atrophic, no abnormal discharge, no lesions  Cervix: no lesions, no discharge and multiparous  Uterus:  anteverted, smooth contour, without enlargement, mobile, and without tenderness  Ovaries:  No masses appreciated, non-tender, mobile  Rectal Exam: deferred  Musculoskeletal: extremities normal  Skin: no suspicious lesions or rashes  Psychiatric: Affect appropriate, cooperative, mentation appears normal.     COUNSELING:   Reviewed preventive health counseling, as reflected in patient instructions   reports that she has never smoked. She has never used smokeless tobacco.        FRAX Risk Assessment  ASSESSMENT:  63 year old  with satisfactory annual exam  (Z12.4) Screening for cervical cancer  (primary encounter diagnosis)    Plan: Pap imaged thin layer screen with HPV -         recommended age 30 - 65 years (select HPV order        below), HPV High Risk Types DNA Cervical  Discussed signs and symptoms of pelvic relaxation and DONELL and management thereof.  Minimal symptoms presently        Arcadio Silver MD

## 2018-11-02 LAB
COPATH REPORT: NORMAL
PAP: NORMAL

## 2018-11-05 LAB
FINAL DIAGNOSIS: NORMAL
HPV HR 12 DNA CVX QL NAA+PROBE: NEGATIVE
HPV16 DNA SPEC QL NAA+PROBE: NEGATIVE
HPV18 DNA SPEC QL NAA+PROBE: NEGATIVE
SPECIMEN DESCRIPTION: NORMAL
SPECIMEN SOURCE CVX/VAG CYTO: NORMAL

## 2018-11-06 ENCOUNTER — OFFICE VISIT (OUTPATIENT)
Dept: INTERNAL MEDICINE | Facility: CLINIC | Age: 63
End: 2018-11-06
Payer: COMMERCIAL

## 2018-11-06 VITALS
OXYGEN SATURATION: 96 % | HEART RATE: 84 BPM | DIASTOLIC BLOOD PRESSURE: 82 MMHG | WEIGHT: 209.1 LBS | TEMPERATURE: 97.7 F | BODY MASS INDEX: 42.16 KG/M2 | RESPIRATION RATE: 16 BRPM | SYSTOLIC BLOOD PRESSURE: 128 MMHG | HEIGHT: 59 IN

## 2018-11-06 DIAGNOSIS — M77.8 DELTOID TENDINITIS OF RIGHT SHOULDER: Primary | ICD-10-CM

## 2018-11-06 PROCEDURE — T1013 SIGN LANG/ORAL INTERPRETER: HCPCS | Mod: U3

## 2018-11-06 PROCEDURE — 99213 OFFICE O/P EST LOW 20 MIN: CPT | Performed by: NURSE PRACTITIONER

## 2018-11-06 NOTE — MR AVS SNAPSHOT
"              After Visit Summary   2018    Jennifer Fallon    MRN: [id number]           Patient Information     Date Of Birth          1955        Visit Information        Provider Department      2018 7:45 AM Lizbet Caba; Shilpa Camejo, NP [facility]        Today's Diagnoses     Deltoid tendinitis of right shoulder    -  1       Follow-ups after your visit        Follow-up notes from your care team     Return if symptoms worsen or fail to improve.      Who to contact     If you have questions or need follow up information about today's clinic visit or your schedule please contact [facility] directly at [phone].  Normal or non-critical lab and imaging results will be communicated to you by MyChart, letter or phone within 4 business days after the clinic has received the results. If you do not hear from us within 7 days, please contact the clinic through MyChart or phone. If you have a critical or abnormal lab result, we will notify you by phone as soon as possible.  Submit refill requests through [facility] or call your pharmacy and they will forward the refill request to us. Please allow 3 business days for your refill to be completed.          Additional Information About Your Visit        MyChart Information     [facility] lets you send messages to your doctor, view your test results, renew your prescriptions, schedule appointments and more. To sign up, go to [email or web address] . Click on \"Log in\" on the left side of the screen, which will take you to the Welcome page. Then click on \"Sign up Now\" on the right side of the page.     You will be asked to enter the access code listed below, as well as some personal information. Please follow the directions to create your username and password.     Your access code is: [email or web address]  Expires: 2019  9:14 AM     Your access code will  in 90 days. If you need help or a new code, " "please call your [facility] clinic or [phone].        Care EveryWhere ID     This is your Care EveryWhere ID. This could be used by other organizations to access your [facility] medical records  [id number]        Your Vitals Were     Pulse Temperature Respirations Height Pulse Oximetry BMI (Body Mass Index)    84 97.7  F (36.5  C) (Oral) 16 4' 10.75\" (1.492 m) 96% 42.59 kg/m2       Blood Pressure from Last 3 Encounters:   11/06/18 128/82   10/31/18 116/70   06/06/18 128/72    Weight from Last 3 Encounters:   11/06/18 209 lb 1.6 oz (94.8 kg)   10/31/18 205 lb 3.2 oz (93.1 kg)   06/06/18 204 lb (92.5 kg)              Today, you had the following     No orders found for display       Primary Care Provider Office Phone # Fax #    ARINA Dee [facility] [phone]       [address] [facility] [address]        Equal Access to Services     [facility]: Hadii aad ku hadasho Solavelle, waaxda luqadaha, qaybta kaalmada adejacobyajessica, bonnie hamm . So [facility] [phone].    ATENCIÓN: Si habla español, tiene a vaughan disposición servicios gratuitos de asistencia lingüística. Llame al [phone].    We comply with applicable federal civil rights laws and [place] laws. We do not discriminate on the basis of race, color, national origin, age, disability, sex, sexual orientation, or gender identity.            Thank you!     Thank you for choosing [facility]  for your care. Our goal is always to provide you with excellent care. Hearing back from our patients is one way we can continue to improve our services. Please take a few minutes to complete the written survey that you may receive in the mail after your visit with us. Thank you!             Your Updated Medication List - Protect others around you: Learn how to safely use, store and throw away your medicines at www.disposemymeds.org.          This list is accurate as of 11/6/18  9:39 AM.  Always use your " most recent med list.                   Brand Name Dispense Instructions for use Diagnosis    blood glucose monitoring lancets     1 Box    Use to test blood sugar 1 times daily or as directed.    Diabetes mellitus type 2 in obese (H)       blood glucose monitoring meter device kit     1 kit    Use to test blood sugars 1 times daily or as directed.    Diabetes mellitus type 2 in obese (H)       blood glucose monitoring test strip    ACCU-CHEK ARGENIS    3 Box    Use to test blood sugars 1 times daily or as directed.    Diabetes mellitus type 2 in obese (H)       ibuprofen 600 MG tablet    ADVIL/MOTRIN    60 tablet    Take 1 tablet (600 mg) by mouth 2 times daily as needed for moderate pain    Upper back pain       levothyroxine 112 MCG tablet    SYNTHROID/LEVOTHROID    90 tablet    Take 1 tablet (112 mcg) by mouth daily    Hypothyroidism, unspecified type       vitamin D 2000 units tablet     100 tablet    Take 2,000 Units by mouth daily    Vitamin D deficiency

## 2018-11-06 NOTE — PROGRESS NOTES
SUBJECTIVE:   Jennifer Fallon is a 63 year old female who presents to clinic today for the following health issues:      Musculoskeletal problem/pain      Duration: weeks    Description  Location: R upper arm intermittent pain, no radiation    Intensity:  mild    Accompanying signs and symptoms: none    History  Previous similar problem: no   Previous evaluation:  none    Precipitating or alleviating factors:  Trauma or overuse: works as , repetitive vacuum, cleaning  Aggravating factors include: overuse    Therapies tried and outcome: nothing          Problem list and histories reviewed & adjusted, as indicated.  Additional history: as documented    Patient Active Problem List   Diagnosis     Advanced directives, counseling/discussion     CARDIOVASCULAR SCREENING; LDL GOAL LESS THAN 70     Postmenopausal status     Bilateral ankle pain     Dermatophytosis of nail     Diabetes mellitus type 2 in obese (H)     Hypothyroidism, unspecified type     Iron deficiency anemia, unspecified iron deficiency anemia type     Vitamin D deficiency     Morbid obesity (H)     Past Surgical History:   Procedure Laterality Date     CHOLECYSTECTOMY  2004     COLONOSCOPY Left 6/23/2017    Procedure: COMBINED COLONOSCOPY, SINGLE OR MULTIPLE BIOPSY/POLYPECTOMY BY BIOPSY;  COLONOSCOPY with polypectomy of colon polyp by cold biopsy  ;  Surgeon: Chang Beasley MD;  Location: [facility]     TUBAL LIGATION  1987       Social History   Substance Use Topics     Smoking status: Never Smoker     Smokeless tobacco: Never Used     Alcohol use 0.0 oz/week     0 Standard drinks or equivalent per week      Comment: Socially     Family History   Problem Relation Age of Onset     Diabetes Brother      Diabetes Brother      Coronary Artery Disease Mother      Breast Cancer No family hx of      Colon Cancer No family hx of      Prostate Cancer No family hx of      Other Cancer No family hx of          Current Outpatient Prescriptions  "  Medication Sig Dispense Refill     blood glucose monitoring (ACCU-CHEK ARGENIS PLUS) meter device kit Use to test blood sugars 1 times daily or as directed. 1 kit 0     blood glucose monitoring (ACCU-CHEK ARGENIS) test strip Use to test blood sugars 1 times daily or as directed. 3 Box 1     blood glucose monitoring (ACCU-CHEK MULTICLIX) lancets Use to test blood sugar 1 times daily or as directed. 1 Box 0     Cholecalciferol (VITAMIN D) 2000 UNITS tablet Take 2,000 Units by mouth daily 100 tablet 3     ibuprofen (ADVIL/MOTRIN) 600 MG tablet Take 1 tablet (600 mg) by mouth 2 times daily as needed for moderate pain 60 tablet 1     levothyroxine (SYNTHROID/LEVOTHROID) 112 MCG tablet Take 1 tablet (112 mcg) by mouth daily 90 tablet 3     BP Readings from Last 3 Encounters:   11/06/18 128/82   10/31/18 116/70   06/06/18 128/72    Wt Readings from Last 3 Encounters:   11/06/18 209 lb 1.6 oz (94.8 kg)   10/31/18 205 lb 3.2 oz (93.1 kg)   06/06/18 204 lb (92.5 kg)                    Reviewed and updated as needed this visit by clinical staff  Tobacco  Allergies  Meds  Med Hx  Surg Hx  Fam Hx  Soc Hx      Reviewed and updated as needed this visit by Provider         ROS:  Constitutional, HEENT, cardiovascular, pulmonary, gi and gu systems are negative, except as otherwise noted.    OBJECTIVE:     /82 (BP Location: Right arm, Patient Position: Sitting, Cuff Size: Adult Large)  Pulse 84  Temp 97.7  F (36.5  C) (Oral)  Resp 16  Ht 4' 10.75\" (1.492 m)  Wt 209 lb 1.6 oz (94.8 kg)  SpO2 96%  BMI 42.59 kg/m2  Body mass index is 42.59 kg/(m^2).  GENERAL: healthy, alert and no distress  MS: RUE - tenderness deltoid tendon.  Normal ROM shoulder, elbow        ASSESSMENT/PLAN:               ICD-10-CM    1. Deltoid tendinitis of right shoulder M75.81        NSAIDs, heat, rest    Consider ortho or PT referral if not improving in a few weeks    Shilpa Camejo, BRENNON  [facility]    "

## 2018-11-06 NOTE — NURSING NOTE
patient complaining of pain in her rt upper arm for 1 year that is now getting worse and radiating into her neck.

## 2019-05-07 ENCOUNTER — OFFICE VISIT (OUTPATIENT)
Dept: INTERNAL MEDICINE | Facility: CLINIC | Age: 64
End: 2019-05-07
Payer: COMMERCIAL

## 2019-05-07 VITALS
SYSTOLIC BLOOD PRESSURE: 130 MMHG | RESPIRATION RATE: 12 BRPM | HEART RATE: 72 BPM | TEMPERATURE: 98.2 F | BODY MASS INDEX: 41.53 KG/M2 | HEIGHT: 59 IN | OXYGEN SATURATION: 97 % | WEIGHT: 206 LBS | DIASTOLIC BLOOD PRESSURE: 78 MMHG

## 2019-05-07 DIAGNOSIS — E11.69 DIABETES MELLITUS TYPE 2 IN OBESE: ICD-10-CM

## 2019-05-07 DIAGNOSIS — Z00.01 ENCOUNTER FOR GENERAL ADULT MEDICAL EXAMINATION WITH ABNORMAL FINDINGS: Primary | ICD-10-CM

## 2019-05-07 DIAGNOSIS — E03.9 HYPOTHYROIDISM, UNSPECIFIED TYPE: ICD-10-CM

## 2019-05-07 DIAGNOSIS — E66.9 DIABETES MELLITUS TYPE 2 IN OBESE: ICD-10-CM

## 2019-05-07 DIAGNOSIS — E55.9 VITAMIN D DEFICIENCY: ICD-10-CM

## 2019-05-07 LAB
BASOPHILS # BLD AUTO: 0 10E9/L (ref 0–0.2)
BASOPHILS NFR BLD AUTO: 0.3 %
DIFFERENTIAL METHOD BLD: ABNORMAL
EOSINOPHIL # BLD AUTO: 0.3 10E9/L (ref 0–0.7)
EOSINOPHIL NFR BLD AUTO: 2.6 %
ERYTHROCYTE [DISTWIDTH] IN BLOOD BY AUTOMATED COUNT: 17.8 % (ref 10–15)
HBA1C MFR BLD: 6.1 % (ref 0–5.6)
HCT VFR BLD AUTO: 33.6 % (ref 35–47)
HGB BLD-MCNC: 10 G/DL (ref 11.7–15.7)
LYMPHOCYTES # BLD AUTO: 2 10E9/L (ref 0.8–5.3)
LYMPHOCYTES NFR BLD AUTO: 19.8 %
MCH RBC QN AUTO: 23.4 PG (ref 26.5–33)
MCHC RBC AUTO-ENTMCNC: 29.8 G/DL (ref 31.5–36.5)
MCV RBC AUTO: 79 FL (ref 78–100)
MONOCYTES # BLD AUTO: 0.7 10E9/L (ref 0–1.3)
MONOCYTES NFR BLD AUTO: 6.6 %
NEUTROPHILS # BLD AUTO: 7.2 10E9/L (ref 1.6–8.3)
NEUTROPHILS NFR BLD AUTO: 70.7 %
PLATELET # BLD AUTO: 284 10E9/L (ref 150–450)
RBC # BLD AUTO: 4.28 10E12/L (ref 3.8–5.2)
WBC # BLD AUTO: 10.1 10E9/L (ref 4–11)

## 2019-05-07 PROCEDURE — T1013 SIGN LANG/ORAL INTERPRETER: HCPCS | Mod: U3 | Performed by: NURSE PRACTITIONER

## 2019-05-07 PROCEDURE — 36415 COLL VENOUS BLD VENIPUNCTURE: CPT | Performed by: NURSE PRACTITIONER

## 2019-05-07 PROCEDURE — 82043 UR ALBUMIN QUANTITATIVE: CPT | Performed by: NURSE PRACTITIONER

## 2019-05-07 PROCEDURE — 83036 HEMOGLOBIN GLYCOSYLATED A1C: CPT | Performed by: NURSE PRACTITIONER

## 2019-05-07 PROCEDURE — 84443 ASSAY THYROID STIM HORMONE: CPT | Performed by: NURSE PRACTITIONER

## 2019-05-07 PROCEDURE — 99396 PREV VISIT EST AGE 40-64: CPT | Performed by: NURSE PRACTITIONER

## 2019-05-07 PROCEDURE — 82306 VITAMIN D 25 HYDROXY: CPT | Performed by: NURSE PRACTITIONER

## 2019-05-07 PROCEDURE — 80053 COMPREHEN METABOLIC PANEL: CPT | Performed by: NURSE PRACTITIONER

## 2019-05-07 PROCEDURE — 80061 LIPID PANEL: CPT | Performed by: NURSE PRACTITIONER

## 2019-05-07 PROCEDURE — 85025 COMPLETE CBC W/AUTO DIFF WBC: CPT | Performed by: NURSE PRACTITIONER

## 2019-05-07 RX ORDER — LEVOTHYROXINE SODIUM 112 UG/1
112 TABLET ORAL DAILY
Qty: 30 TABLET | Refills: 1 | Status: SHIPPED | OUTPATIENT
Start: 2019-05-07 | End: 2019-06-13

## 2019-05-07 RX ORDER — LEVOTHYROXINE SODIUM 112 UG/1
112 TABLET ORAL DAILY
Qty: 90 TABLET | Refills: 3 | Status: SHIPPED | OUTPATIENT
Start: 2019-05-07 | End: 2019-05-07

## 2019-05-07 ASSESSMENT — ENCOUNTER SYMPTOMS
DYSURIA: 0
PALPITATIONS: 0
HEARTBURN: 0
ARTHRALGIAS: 1
ABDOMINAL PAIN: 0
HEADACHES: 0
MYALGIAS: 0
EYE PAIN: 0
FEVER: 0
NERVOUS/ANXIOUS: 0
COUGH: 0
SORE THROAT: 0
NAUSEA: 0
FREQUENCY: 0
SHORTNESS OF BREATH: 0
HEMATOCHEZIA: 0
CHILLS: 0
DIZZINESS: 0
PARESTHESIAS: 0
BREAST MASS: 0
DIARRHEA: 0
CONSTIPATION: 1
WEAKNESS: 0
HEMATURIA: 0
JOINT SWELLING: 0

## 2019-05-07 ASSESSMENT — MIFFLIN-ST. JEOR: SCORE: 1400.04

## 2019-05-07 NOTE — PROGRESS NOTES
SUBJECTIVE:   CC: Jennifer Fallon is an 63 year old woman who presents for preventive health visit.     Healthy Habits:     Getting at least 3 servings of Calcium per day:  Yes    Bi-annual eye exam:  Yes    Dental care twice a year:  NO    Sleep apnea or symptoms of sleep apnea:  None    Diet:  Low fat/cholesterol    Frequency of exercise:  None    Medication side effects:  None    PHQ-2 Total Score: 0    Additional concerns today:  No    Today's PHQ-2 Score:   PHQ-2 ( 1999 Pf[employer]) 5/7/2019   Q1: Little interest or pleasure in doing things 0   Q2: Feeling down, depressed or hopeless 0   PHQ-2 Score 0   Q1: Little interest or pleasure in doing things Not at all   Q2: Feeling down, depressed or hopeless Not at all   PHQ-2 Score 0       Abuse: Current or Past(Physical, Sexual or Emotional)- no   Do you feel safe in your environment? Yes    Social History     Tobacco Use     Smoking status: Never Smoker     Smokeless tobacco: Never Used   Substance Use Topics     Alcohol use: Yes     Alcohol/week: 0.0 oz     Comment: Socially         Alcohol Use 5/7/2019   Prescreen: >3 drinks/day or >7 drinks/week? Not Applicable   Prescreen: >3 drinks/day or >7 drinks/week? -       Reviewed orders with patient.  Reviewed health maintenance and updated orders accordingly - Yes          Pertinent mammograms are reviewed under the imaging tab.  History of abnormal Pap smear:   PAP / HPV Latest Ref Rng & Units 10/31/2018 3/23/2016   PAP - NIL NIL   HPV 16 DNA NEG:Negative Negative -   HPV 18 DNA NEG:Negative Negative -   OTHER HR HPV NEG:Negative Negative -     Reviewed and updated as needed this visit by clinical staff  Tobacco  Allergies  Fam Hx  Soc Hx        Reviewed and updated as needed this visit by Provider  Allergies            Review of Systems   Constitutional: Negative for chills and fever.   HENT: Positive for congestion. Negative for ear pain, hearing loss and sore throat.    Eyes: Negative for pain and visual  "disturbance.   Respiratory: Negative for cough and shortness of breath.    Cardiovascular: Negative for chest pain, palpitations and peripheral edema.   Gastrointestinal: Positive for constipation. Negative for abdominal pain, diarrhea, heartburn, hematochezia and nausea.   Breasts:  Negative for tenderness, breast mass and discharge.   Genitourinary: Positive for urgency. Negative for dysuria, frequency, genital sores, hematuria, pelvic pain, vaginal bleeding and vaginal discharge.   Musculoskeletal: Positive for arthralgias. Negative for joint swelling and myalgias.   Skin: Negative for rash.   Neurological: Negative for dizziness, weakness, headaches and paresthesias.   Psychiatric/Behavioral: Negative for mood changes. The patient is not nervous/anxious.      Constipation   Water and lemon and cucumber slices     Urgency improved with water     Joint aches   Cramps in legs   knee pain is better now   Shoulder pain also better     Going to [place] for 3 month and may need 3 month supply of medication   Usually only gets one month - but  get 3 month - same insurance?      OBJECTIVE:   /78   Pulse 72   Temp 98.2  F (36.8  C) (Oral)   Resp 12   Ht 1.499 m (4' 11\")   Wt 93.4 kg (206 lb)   SpO2 97%   BMI 41.61 kg/m    Physical Exam  GENERAL: alert and no distress- here with    EYES: Eyes grossly normal to inspection,  and conjunctivae and sclerae normal  HENT: ear canals and TM's normal, nose and mouth without ulcers or lesions  NECK: no adenopathy, no asymmetry, masses, or scars and thyroid normal to palpation  RESP: lungs clear to auscultation - no rales, rhonchi or wheezes  CV: regular rate and rhythm, normal S1 S2, no S3 or S4, no murmur, click or rub, no peripheral edema and peripheral pulses strong  ABDOMEN: soft, nontender, no hepatosplenomegaly, no masses and bowel sounds normal  MS: no gross musculoskeletal defects noted, no edema  SKIN: no suspicious lesions or rashes  NEURO: " "Normal strength and tone, mentation intact and speech normal  PSYCH: mentation appears normal, affect normal/bright    Diagnostic Test Results:  Lab     ASSESSMENT/PLAN:   1. Encounter for general adult medical examination with abnormal findings    - Lipid panel reflex to direct LDL Fasting  - TSH with free T4 reflex  - CBC with platelets and differential  - Comprehensive metabolic panel  - levothyroxine (SYNTHROID/LEVOTHROID) 112 MCG tablet; Take 1 tablet (112 mcg) by mouth daily  Dispense: 30 tablet; Refill: 1    2. Diabetes mellitus type 2 in obese (H)  Lab Results   Component Value Date    A1C 6.1 05/07/2019    A1C 5.8 06/06/2018    A1C 6.2 11/30/2017    A1C 5.9 03/01/2017    A1C 5.7 08/25/2016       - TSH with free T4 reflex  - Comprehensive metabolic panel  - Hemoglobin A1c  - Albumin Random Urine Quantitative with Creat Ratio    3. Hypothyroidism, unspecified type    - Vitamin D Deficiency  - levothyroxine (SYNTHROID/LEVOTHROID) 112 MCG tablet; Take 1 tablet (112 mcg) by mouth daily  Dispense: 30 tablet; Refill: 1    4. Vitamin D deficiency        COUNSELING:  Reviewed preventive health counseling, as reflected in patient instructions       Regular exercise       Healthy diet/nutrition       Osteoporosis Prevention/Bone Health    BP Readings from Last 1 Encounters:   05/07/19 130/78     Estimated body mass index is 41.61 kg/m  as calculated from the following:    Height as of this encounter: 1.499 m (4' 11\").    Weight as of this encounter: 93.4 kg (206 lb).           reports that she has never smoked. She has never used smokeless tobacco.      Counseling Resources:  ATP IV Guidelines  Pooled Cohorts Equation Calculator  Breast Cancer Risk Calculator  FRAX Risk Assessment  ICSI Preventive Guidelines  Dietary Guidelines for Americans, 2010  USDA's MyPlate  ASA Prophylaxis  Lung CA Screening    ARINA Dee [facility]  "

## 2019-05-07 NOTE — PATIENT INSTRUCTIONS
Lab in [address]    To schedule your mammogram, you may call the breast center at [phone]. Please schedule for 10/18/2019 or later.     Look in to the SHINGREX injection injection - check with insurance

## 2019-05-08 DIAGNOSIS — D64.9 ANEMIA, UNSPECIFIED TYPE: ICD-10-CM

## 2019-05-08 DIAGNOSIS — E55.9 VITAMIN D DEFICIENCY: Primary | ICD-10-CM

## 2019-05-08 LAB
ALBUMIN SERPL-MCNC: 3.4 G/DL (ref 3.4–5)
ALP SERPL-CCNC: 119 U/L (ref 40–150)
ALT SERPL W P-5'-P-CCNC: 29 U/L (ref 0–50)
ANION GAP SERPL CALCULATED.3IONS-SCNC: 5 MMOL/L (ref 3–14)
AST SERPL W P-5'-P-CCNC: 22 U/L (ref 0–45)
BILIRUB SERPL-MCNC: 0.3 MG/DL (ref 0.2–1.3)
BUN SERPL-MCNC: 13 MG/DL (ref 7–30)
CALCIUM SERPL-MCNC: 8.9 MG/DL (ref 8.5–10.1)
CHLORIDE SERPL-SCNC: 109 MMOL/L (ref 94–109)
CHOLEST SERPL-MCNC: 168 MG/DL
CO2 SERPL-SCNC: 24 MMOL/L (ref 20–32)
CREAT SERPL-MCNC: 0.46 MG/DL (ref 0.52–1.04)
CREAT UR-MCNC: 121 MG/DL
DEPRECATED CALCIDIOL+CALCIFEROL SERPL-MC: 14 UG/L (ref 20–75)
GFR SERPL CREATININE-BSD FRML MDRD: >90 ML/MIN/{1.73_M2}
GLUCOSE SERPL-MCNC: 128 MG/DL (ref 70–99)
HDLC SERPL-MCNC: 35 MG/DL
LDLC SERPL CALC-MCNC: 93 MG/DL
MICROALBUMIN UR-MCNC: 13 MG/L
MICROALBUMIN/CREAT UR: 10.66 MG/G CR (ref 0–25)
NONHDLC SERPL-MCNC: 133 MG/DL
POTASSIUM SERPL-SCNC: 4 MMOL/L (ref 3.4–5.3)
PROT SERPL-MCNC: 7.6 G/DL (ref 6.8–8.8)
SODIUM SERPL-SCNC: 138 MMOL/L (ref 133–144)
TRIGL SERPL-MCNC: 200 MG/DL
TSH SERPL DL<=0.005 MIU/L-ACNC: 2.38 MU/L (ref 0.4–4)

## 2019-05-08 RX ORDER — ERGOCALCIFEROL 1.25 MG/1
50000 CAPSULE, LIQUID FILLED ORAL WEEKLY
Qty: 8 CAPSULE | Refills: 0 | Status: SHIPPED | OUTPATIENT
Start: 2019-05-08 | End: 2020-01-07

## 2019-05-13 DIAGNOSIS — D64.9 ANEMIA, UNSPECIFIED TYPE: ICD-10-CM

## 2019-05-13 DIAGNOSIS — E55.9 VITAMIN D DEFICIENCY: ICD-10-CM

## 2019-05-13 PROCEDURE — 36415 COLL VENOUS BLD VENIPUNCTURE: CPT | Performed by: NURSE PRACTITIONER

## 2019-05-13 PROCEDURE — 82306 VITAMIN D 25 HYDROXY: CPT | Performed by: NURSE PRACTITIONER

## 2019-05-13 PROCEDURE — 83540 ASSAY OF IRON: CPT | Performed by: NURSE PRACTITIONER

## 2019-05-13 PROCEDURE — 82728 ASSAY OF FERRITIN: CPT | Performed by: NURSE PRACTITIONER

## 2019-05-13 PROCEDURE — 83550 IRON BINDING TEST: CPT | Performed by: NURSE PRACTITIONER

## 2019-05-14 DIAGNOSIS — D50.9 IRON DEFICIENCY ANEMIA, UNSPECIFIED IRON DEFICIENCY ANEMIA TYPE: Primary | ICD-10-CM

## 2019-05-14 DIAGNOSIS — E55.9 VITAMIN D DEFICIENCY: ICD-10-CM

## 2019-05-14 LAB
DEPRECATED CALCIDIOL+CALCIFEROL SERPL-MC: 14 UG/L (ref 20–75)
FERRITIN SERPL-MCNC: 6 NG/ML (ref 8–252)
IRON SATN MFR SERPL: 4 % (ref 15–46)
IRON SERPL-MCNC: 16 UG/DL (ref 35–180)
TIBC SERPL-MCNC: 451 UG/DL (ref 240–430)

## 2019-05-14 RX ORDER — ACETAMINOPHEN 160 MG
2000 TABLET,DISINTEGRATING ORAL DAILY
Qty: 100 CAPSULE | Refills: 3 | Status: SHIPPED | OUTPATIENT
Start: 2019-05-14 | End: 2020-01-07

## 2019-06-12 DIAGNOSIS — E03.9 HYPOTHYROIDISM, UNSPECIFIED TYPE: ICD-10-CM

## 2019-06-12 DIAGNOSIS — Z00.01 ENCOUNTER FOR GENERAL ADULT MEDICAL EXAMINATION WITH ABNORMAL FINDINGS: ICD-10-CM

## 2019-06-12 NOTE — TELEPHONE ENCOUNTER
"Patient calling to get a 3 month supply because she is leaving the country on 6[place]19[place]19 for 3 months.    Requested Prescriptions   Pending Prescriptions Disp Refills     levothyroxine (SYNTHROID/LEVOTHROID) 112 MCG tablet  Last Written Prescription Date:  5/7/19  Last Fill Quantity: 30,  # refills: 1   Last office visit: 5/7/2019 with prescribing provider:  Clark   Future Office Visit:     30 tablet 1     Sig: Take 1 tablet (112 mcg) by mouth daily       Thyroid Protocol Passed - 6/12/2019 11:59 AM        Passed - Patient is 12 years or older        Passed - Recent (12 mo) or future (30 days) visit within the authorizing provider's specialty     Patient had office visit in the last 12 months or has a visit in the next 30 days with authorizing provider or within the authorizing provider's specialty.  See \"Patient Info\" tab in inbasket, or \"Choose Columns\" in Meds & Orders section of the refill encounter.              Passed - Medication is active on med list        Passed - Normal TSH on file in past 12 months     Recent Labs   Lab Test 05/07/19  0919   TSH 2.38              Passed - No active pregnancy on record     If patient is pregnant or has had a positive pregnancy test, please check TSH.          Passed - No positive pregnancy test in past 12 months     If patient is pregnant or has had a positive pregnancy test, please check TSH.            "

## 2019-06-13 RX ORDER — LEVOTHYROXINE SODIUM 112 UG/1
112 TABLET ORAL DAILY
Qty: 90 TABLET | Refills: 2 | Status: SHIPPED | OUTPATIENT
Start: 2019-06-13 | End: 2020-01-07

## 2019-12-04 ENCOUNTER — ALLIED HEALTH/NURSE VISIT (OUTPATIENT)
Dept: NURSING | Facility: CLINIC | Age: 64
End: 2019-12-04
Payer: COMMERCIAL

## 2019-12-04 DIAGNOSIS — Z23 NEED FOR SHINGLES VACCINE: ICD-10-CM

## 2019-12-04 DIAGNOSIS — Z23 NEED FOR PROPHYLACTIC VACCINATION AND INOCULATION AGAINST INFLUENZA: Primary | ICD-10-CM

## 2019-12-04 PROCEDURE — 90682 RIV4 VACC RECOMBINANT DNA IM: CPT

## 2019-12-04 PROCEDURE — 90472 IMMUNIZATION ADMIN EACH ADD: CPT

## 2019-12-04 PROCEDURE — 90750 HZV VACC RECOMBINANT IM: CPT

## 2019-12-04 PROCEDURE — 90471 IMMUNIZATION ADMIN: CPT

## 2019-12-04 NOTE — NURSING NOTE
Prior to immunization administration, verified patients identity using patient s name and date of birth. Please see Immunization Activity for additional information.     Screening Questionnaire for Adult Immunization    Are you sick today?   No   Do you have allergies to medications, food, a vaccine component or latex?   No   Have you ever had a serious reaction after receiving a vaccination?   No   Do you have a long-term health problem with heart disease, lung disease, asthma, kidney disease, metabolic disease (e.g. diabetes), anemia, or other blood disorder?   No   Do you have cancer, leukemia, HIV/AIDS, or any other immune system problem?   No   In the past 3 months, have you taken medications that affect  your immune system, such as prednisone, other steroids, or anticancer drugs; drugs for the treatment of rheumatoid arthritis, Crohn s disease, or psoriasis; or have you had radiation treatments?   No   Have you had a seizure, or a brain or other nervous system problem?   No   During the past year, have you received a transfusion of blood or blood     products, or been given immune (gamma) globulin or antiviral drug?   No   For women: Are you pregnant or is there a chance you could become        pregnant during the next month?   No   Have you received any vaccinations in the past 4 weeks?   No     Immunization questionnaire answers were all negative.        Per orders of Kesha Rodas CNP, injection of Shingrix and flu shot given by Jade Henry CMA. Patient instructed to remain in clinic for 15 minutes afterwards, and to report any adverse reaction to me immediately.       Screening performed by Jade Henry CMA on 12/4/2019 at 12:07 PM.

## 2020-01-07 ENCOUNTER — OFFICE VISIT (OUTPATIENT)
Dept: INTERNAL MEDICINE | Facility: CLINIC | Age: 65
End: 2020-01-07
Payer: COMMERCIAL

## 2020-01-07 VITALS
DIASTOLIC BLOOD PRESSURE: 68 MMHG | TEMPERATURE: 97.9 F | SYSTOLIC BLOOD PRESSURE: 108 MMHG | HEART RATE: 68 BPM | OXYGEN SATURATION: 96 % | WEIGHT: 207 LBS | RESPIRATION RATE: 12 BRPM | BODY MASS INDEX: 41.73 KG/M2 | HEIGHT: 59 IN

## 2020-01-07 DIAGNOSIS — D50.9 IRON DEFICIENCY ANEMIA, UNSPECIFIED IRON DEFICIENCY ANEMIA TYPE: ICD-10-CM

## 2020-01-07 DIAGNOSIS — E11.69 DIABETES MELLITUS TYPE 2 IN OBESE: ICD-10-CM

## 2020-01-07 DIAGNOSIS — M75.21 BICEPS TENDINITIS OF RIGHT UPPER EXTREMITY: ICD-10-CM

## 2020-01-07 DIAGNOSIS — Z11.4 ENCOUNTER FOR SCREENING FOR HIV: ICD-10-CM

## 2020-01-07 DIAGNOSIS — E66.01 MORBID OBESITY (H): ICD-10-CM

## 2020-01-07 DIAGNOSIS — Z12.31 ENCOUNTER FOR SCREENING MAMMOGRAM FOR BREAST CANCER: ICD-10-CM

## 2020-01-07 DIAGNOSIS — Z00.01 ENCOUNTER FOR GENERAL ADULT MEDICAL EXAMINATION WITH ABNORMAL FINDINGS: Primary | ICD-10-CM

## 2020-01-07 DIAGNOSIS — E03.9 HYPOTHYROIDISM, UNSPECIFIED TYPE: ICD-10-CM

## 2020-01-07 DIAGNOSIS — E66.9 DIABETES MELLITUS TYPE 2 IN OBESE: ICD-10-CM

## 2020-01-07 DIAGNOSIS — E55.9 VITAMIN D DEFICIENCY: ICD-10-CM

## 2020-01-07 LAB
BASOPHILS # BLD AUTO: 0 10E9/L (ref 0–0.2)
BASOPHILS NFR BLD AUTO: 0.2 %
DIFFERENTIAL METHOD BLD: ABNORMAL
EOSINOPHIL # BLD AUTO: 0.2 10E9/L (ref 0–0.7)
EOSINOPHIL NFR BLD AUTO: 2.2 %
ERYTHROCYTE [DISTWIDTH] IN BLOOD BY AUTOMATED COUNT: 17.6 % (ref 10–15)
HBA1C MFR BLD: 6.4 % (ref 0–5.6)
HCT VFR BLD AUTO: 34.6 % (ref 35–47)
HGB BLD-MCNC: 10.1 G/DL (ref 11.7–15.7)
LYMPHOCYTES # BLD AUTO: 1.7 10E9/L (ref 0.8–5.3)
LYMPHOCYTES NFR BLD AUTO: 17.4 %
MCH RBC QN AUTO: 23.1 PG (ref 26.5–33)
MCHC RBC AUTO-ENTMCNC: 29.2 G/DL (ref 31.5–36.5)
MCV RBC AUTO: 79 FL (ref 78–100)
MONOCYTES # BLD AUTO: 0.6 10E9/L (ref 0–1.3)
MONOCYTES NFR BLD AUTO: 5.5 %
NEUTROPHILS # BLD AUTO: 7.4 10E9/L (ref 1.6–8.3)
NEUTROPHILS NFR BLD AUTO: 74.7 %
PLATELET # BLD AUTO: 291 10E9/L (ref 150–450)
RBC # BLD AUTO: 4.38 10E12/L (ref 3.8–5.2)
WBC # BLD AUTO: 9.9 10E9/L (ref 4–11)

## 2020-01-07 PROCEDURE — 83036 HEMOGLOBIN GLYCOSYLATED A1C: CPT | Performed by: NURSE PRACTITIONER

## 2020-01-07 PROCEDURE — 80061 LIPID PANEL: CPT | Performed by: NURSE PRACTITIONER

## 2020-01-07 PROCEDURE — 80050 GENERAL HEALTH PANEL: CPT | Performed by: NURSE PRACTITIONER

## 2020-01-07 PROCEDURE — 36415 COLL VENOUS BLD VENIPUNCTURE: CPT | Performed by: NURSE PRACTITIONER

## 2020-01-07 PROCEDURE — 99207 C FOOT EXAM  NO CHARGE: CPT | Mod: 25 | Performed by: NURSE PRACTITIONER

## 2020-01-07 PROCEDURE — 87389 HIV-1 AG W/HIV-1&-2 AB AG IA: CPT | Performed by: NURSE PRACTITIONER

## 2020-01-07 PROCEDURE — 99396 PREV VISIT EST AGE 40-64: CPT | Performed by: NURSE PRACTITIONER

## 2020-01-07 PROCEDURE — 82306 VITAMIN D 25 HYDROXY: CPT | Performed by: NURSE PRACTITIONER

## 2020-01-07 RX ORDER — LEVOTHYROXINE SODIUM 112 UG/1
112 TABLET ORAL DAILY
Qty: 90 TABLET | Refills: 3 | Status: SHIPPED | OUTPATIENT
Start: 2020-01-07 | End: 2021-02-02

## 2020-01-07 RX ORDER — NAPROXEN 500 MG/1
500 TABLET ORAL 2 TIMES DAILY WITH MEALS
Qty: 60 TABLET | Refills: 1 | Status: SHIPPED | OUTPATIENT
Start: 2020-01-07 | End: 2021-06-01

## 2020-01-07 ASSESSMENT — ENCOUNTER SYMPTOMS
SORE THROAT: 0
JOINT SWELLING: 1
HEMATURIA: 0
HEADACHES: 0
HEARTBURN: 0
FEVER: 0
MYALGIAS: 1
DYSURIA: 0
WEAKNESS: 0
ABDOMINAL PAIN: 1
NERVOUS/ANXIOUS: 0
COUGH: 0
SHORTNESS OF BREATH: 0
FREQUENCY: 0
CONSTIPATION: 0
EYE PAIN: 0
CHILLS: 0
DIZZINESS: 0
NAUSEA: 0
ARTHRALGIAS: 1
PALPITATIONS: 0
BREAST MASS: 0
HEMATOCHEZIA: 0
PARESTHESIAS: 0
DIARRHEA: 0

## 2020-01-07 ASSESSMENT — MIFFLIN-ST. JEOR: SCORE: 1399.58

## 2020-01-07 NOTE — PROGRESS NOTES
"   SUBJECTIVE:   CC: Jennifer Fallon is an 64 year old woman who presents for preventive health visit.     Healthy Habits:     Getting at least 3 servings of Calcium per day:  Yes    Bi-annual eye exam:  Yes    Dental care twice a year:  Yes    Sleep apnea or symptoms of sleep apnea:  Daytime drowsiness    Diet:  Regular (no restrictions)    Frequency of exercise:  1 day/week    Duration of exercise:  15-30 minutes    Taking medications regularly:  Yes    Medication side effects:  None    PHQ-2 Total Score: 0    Additional concerns today:  Yes      Right arm pain since having a flu shot 4 weeks ago.  Feels like something is in her stomach, \"like something is hurting me in there\".     Today's PHQ-2 Score:   PHQ-2 ( 1999 Pfizer) 1/7/2020   Q1: Little interest or pleasure in doing things 0   Q2: Feeling down, depressed or hopeless 0   PHQ-2 Score 0   Q1: Little interest or pleasure in doing things Not at all   Q2: Feeling down, depressed or hopeless Not at all   PHQ-2 Score 0       Abuse: Current or Past(Physical, Sexual or Emotional)- No  Do you feel safe in your environment? Yes        Social History     Tobacco Use     Smoking status: Never Smoker     Smokeless tobacco: Never Used   Substance Use Topics     Alcohol use: Yes     Alcohol/week: 0.0 standard drinks     Comment: Socially         Alcohol Use 1/7/2020   Prescreen: >3 drinks/day or >7 drinks/week? No   Prescreen: >3 drinks/day or >7 drinks/week? -       Reviewed orders with patient.  Reviewed health maintenance and updated orders accordingly - Yes          Pertinent mammograms are reviewed under the imaging tab.  History of abnormal Pap smear: NO - age 30-65 PAP every 5 years with negative HPV co-testing recommended  PAP / HPV Latest Ref Rng & Units 10/31/2018 3/23/2016   PAP - NIL NIL   HPV 16 DNA NEG:Negative Negative -   HPV 18 DNA NEG:Negative Negative -   OTHER HR HPV NEG:Negative Negative -     Reviewed and updated as needed this visit by clinical " "staff  Tobacco  Allergies  Meds  Problems  Med Hx  Surg Hx  Fam Hx  Soc Hx          Reviewed and updated as needed this visit by Provider  Tobacco  Allergies  Meds  Problems  Med Hx  Surg Hx  Fam Hx            Review of Systems   Constitutional: Negative for chills and fever.   HENT: Negative for congestion, ear pain, hearing loss and sore throat.    Eyes: Negative for pain and visual disturbance.   Respiratory: Negative for cough and shortness of breath.    Cardiovascular: Negative for chest pain, palpitations and peripheral edema.   Gastrointestinal: Positive for abdominal pain. Negative for constipation, diarrhea, heartburn, hematochezia and nausea.   Breasts:  Negative for tenderness, breast mass and discharge.   Genitourinary: Negative for dysuria, frequency, genital sores, hematuria, pelvic pain, urgency, vaginal bleeding and vaginal discharge.   Musculoskeletal: Positive for arthralgias, joint swelling and myalgias.   Skin: Negative for rash.   Neurological: Negative for dizziness, weakness, headaches and paresthesias.   Psychiatric/Behavioral: Negative for mood changes. The patient is not nervous/anxious.      Right arm pain since having a flu shot 4 weeks ago.  Using warm water and helps some   Pain in arm 8/10  Felt like she felt something change with vacuuming   This was after flu shot      Saw chiropractor and said she had some carpal tunnel issues - this is better   Told her to do exercises   Helps when she takes medication - tylenol     Feels like something is in her stomach, \"like something is hurting me in there\".   Bother her and not a pain   Epigastric area pain - feels like there is a lump in there that is hurting   Nothing makes it better   Does not change with eating   Bends  and feels some discomfort     Fasting for lab     Foot exam normal        OBJECTIVE:   /68   Pulse 68   Temp 97.9  F (36.6  C) (Oral)   Resp 12   Ht 1.499 m (4' 11\")   Wt 93.9 kg (207 lb)   SpO2 " 96%   BMI 41.81 kg/m    Physical Exam  GENERAL: alert and no distress- seen with    EYES: Eyes grossly normal to inspection,  and conjunctivae and sclerae normal  HENT: ear canals and TM's normal, nose and mouth without ulcers or lesions  NECK: no adenopathy, no asymmetry, masses, or scars and thyroid normal to palpation  RESP: lungs clear to auscultation - no rales, rhonchi or wheezes  BREAST: normal without masses, tenderness or nipple discharge and no palpable axillary masses or adenopathy  CV: regular rate and rhythm, normal S1 S2, no S3 or S4, no murmur, click or rub, no peripheral edema and peripheral pulses strong  ABDOMEN: soft, nontender, no hepatosplenomegaly, no masses and bowel sounds normal  MS: pain in biceps tendon    SKIN: no suspicious lesions or rashes  NEURO: Normal strength and tone, mentation intact and speech normal  PSYCH: mentation appears normal, affect normal/bright    Diagnostic Test Results:  Labs reviewed in [facility]  Lab     ASSESSMENT/PLAN:   1. Encounter for general adult medical examination with abnormal findings    - Lipid panel reflex to direct LDL Fasting  - Comprehensive metabolic panel  - CBC with platelets and differential  - TSH with free T4 reflex    2. Morbid obesity (H)  Discussed   - Comprehensive metabolic panel    3. Hypothyroidism, unspecified type  Needs refill   - levothyroxine (SYNTHROID/LEVOTHROID) 112 MCG tablet; Take 1 tablet (112 mcg) by mouth daily  Dispense: 90 tablet; Refill: 3  - TSH with free T4 reflex    4. Vitamin D deficiency    - Vitamin D Deficiency    5. Diabetes mellitus type 2 in obese (H)  Stable   Lab Results   Component Value Date    A1C 6.4 01/07/2020    A1C 6.1 05/07/2019    A1C 5.8 06/06/2018    A1C 6.2 11/30/2017    A1C 5.9 03/01/2017       - Lipid panel reflex to direct LDL Fasting  - Comprehensive metabolic panel  - CBC with platelets and differential  - TSH with free T4 reflex  - Hemoglobin A1c  - FOOT EXAM    6. Iron deficiency  "anemia, unspecified iron deficiency anemia type    - CBC with platelets and differential    7. Encounter for screening for HIV    - HIV Antigen Antibody Combo    8. Encounter for screening mammogram for breast cancer    - *MA Screening Digital Bilateral; Future    9. Biceps tendinitis of right upper extremity  Discussed   - naproxen (NAPROSYN) 500 MG tablet; Take 1 tablet (500 mg) by mouth 2 times daily (with meals)  Dispense: 60 tablet; Refill: 1    COUNSELING:  Reviewed preventive health counseling, as reflected in patient instructions       Regular exercise       Healthy diet/nutrition       Osteoporosis Prevention/Bone Health    Estimated body mass index is 41.81 kg/m  as calculated from the following:    Height as of this encounter: 1.499 m (4' 11\").    Weight as of this encounter: 93.9 kg (207 lb).         reports that she has never smoked. She has never used smokeless tobacco.      Counseling Resources:  ATP IV Guidelines  Pooled Cohorts Equation Calculator  Breast Cancer Risk Calculator  FRAX Risk Assessment  ICSI Preventive Guidelines  Dietary Guidelines for Americans, 2010  USDA's MyPlate  ASA Prophylaxis  Lung CA Screening    ARINA Dee [facility]  "

## 2020-01-07 NOTE — PATIENT INSTRUCTIONS
Lab in [address]    Thyroid refill sent     To schedule your mammogram, you may call the breast center at [phone].     Naprosyn twice daily with food   Take at least 7-10 days     If not improving would have you see ortho       Patient Education      Qué es la tendinitis del bíceps?    Un tendón es judith ware de tejido wilfredo que conecta un músculo con un hueso. El bíceps es un músculo que está adelante en la parte superior del brazo. Ayuda a realizar movimientos augustus, por ejemplo, flexionar el codo o levantar el brazo. La punta de arriba del músculo bíceps se conoce augustus extremo proximal. Tiene dos tendones llamados de quirino larga y quirino corta. Estos tendones fijan el músculo a los huesos del hombro. La tendinitis del bíceps se presenta cuando alguno de estos tendones se irrita o se pone coleman y se hincha (inflamado). La mayoría de los casos tienen que frank con la quirino larga.  Causas de la tendinitis del bíceps  Pueden incluir:    Desgaste del tendón por envejecimiento o uso normal con el paso del tiempo    Uso excesivo del tendón por actividades deportivas o de trabajo, especialmente las que incluyen movimientos repetitivos por arriba de la quirino    Lesión del tendón por judith caída u otro accidente    Otros problemas del hombro, augustus pinzamiento o desgarro del manguito de los rotadores  Síntomas de la tendinitis del bíceps  Los síntomas comunes incluyen:    Dolor en la parte de adelante del hombro que también puede bajar por el brazo. Puede empeorar al realizar actividades y donovan la noche.    Hinchazón del hombro    Sensación de clic o de traba cuando se usa el brazo y el hombro    Dificultad para  el brazo y el hombro  Tratamiento de la tendinitis del bíceps  El tratamiento puede incluir:    [place] del brazo y el hombro. [place] quiere decir limitar ciertos movimientos, augustus estirar el brazo por arriba de la quirino o levantar el brazo. Hacer esos movimientos puede demorar la curación y empeorar los  "síntomas. También es posible que usted tenga que limitar ciertos deportes y tipos de trabajo por un tiempo.    Tratamiento con frío. Significa usar cosas augustus compresas de hielo para ayudar a aliviar los síntomas. El frío puede ayudar a reducir el dolor y la hinchazón.    Medicamentos. Ayudan a aliviar el dolor y la hinchazón. Los antiinflamatorios no esteroides (\"NSAID\", por lien siglas en inglés) son los medicamentos más comunes para tico uso. Hay recetados o de venta lindsay, y se pueden jose g en forma de pastillas. También se pueden aplicar sobre la piel en forma de gel, crema o un parche.    Inyecciones de medicamento dentro de la katrin lesionada. Ayudan a aliviar el dolor y la hinchazón por un tiempo.    Fisioterapia y ejercicios.  Ayudan a mejorar la fuerza y el rango de movimiento del brazo y el hombro.  Posibles complicaciones    Si no se le da tiempo al tendón para sanar, los síntomas pueden empeorar. Además, el tendón se puede desgarrar (ruptura).    Si el tendón se rompe y no mejora con el tratamiento, es posible que vaughan proveedor de atención médica le recomiende hacerse judith cirugía. En la mayoría de los casos, esto implica reparar y reconectar el tendón.     Cuándo llamar a vaughan proveedor de atención médica  Llame enseguida a vaughan proveedor de atención médica si tiene alguno de los siguientes síntomas:    Fiebre de 100.4  F (38  C) o más, o según le hayan indicado    Síntomas que empeoran o no mejoran con el tratamiento    Debilidad o inestabilidad del brazo u hombro    Dolor lamar repentino, moretones, hinchazón, sensación de chasquido o abultamiento en la parte superior del brazo o en el hombro    Síntomas nuevos   Date Last Reviewed: 3/10/2016    [id number] [employer]. [address] [place] [address], [place] [address]. All rights reserved. This information is not intended as a substitute for professional medical care. Always follow your healthcare professional's instructions.      "

## 2020-01-08 DIAGNOSIS — D50.9 IRON DEFICIENCY ANEMIA, UNSPECIFIED IRON DEFICIENCY ANEMIA TYPE: ICD-10-CM

## 2020-01-08 DIAGNOSIS — E55.9 VITAMIN D DEFICIENCY: Primary | ICD-10-CM

## 2020-01-08 DIAGNOSIS — E78.00 HYPERCHOLESTEREMIA: Primary | ICD-10-CM

## 2020-01-08 LAB
ALBUMIN SERPL-MCNC: 3.2 G/DL (ref 3.4–5)
ALP SERPL-CCNC: 119 U/L (ref 40–150)
ALT SERPL W P-5'-P-CCNC: 25 U/L (ref 0–50)
ANION GAP SERPL CALCULATED.3IONS-SCNC: 7 MMOL/L (ref 3–14)
AST SERPL W P-5'-P-CCNC: 20 U/L (ref 0–45)
BILIRUB SERPL-MCNC: 0.3 MG/DL (ref 0.2–1.3)
BUN SERPL-MCNC: 22 MG/DL (ref 7–30)
CALCIUM SERPL-MCNC: 8.3 MG/DL (ref 8.5–10.1)
CHLORIDE SERPL-SCNC: 108 MMOL/L (ref 94–109)
CHOLEST SERPL-MCNC: 179 MG/DL
CO2 SERPL-SCNC: 24 MMOL/L (ref 20–32)
CREAT SERPL-MCNC: 0.45 MG/DL (ref 0.52–1.04)
DEPRECATED CALCIDIOL+CALCIFEROL SERPL-MC: 17 UG/L (ref 20–75)
GFR SERPL CREATININE-BSD FRML MDRD: >90 ML/MIN/{1.73_M2}
GLUCOSE SERPL-MCNC: 118 MG/DL (ref 70–99)
HDLC SERPL-MCNC: 42 MG/DL
HIV 1+2 AB+HIV1 P24 AG SERPL QL IA: NONREACTIVE
LDLC SERPL CALC-MCNC: 115 MG/DL
NONHDLC SERPL-MCNC: 137 MG/DL
POTASSIUM SERPL-SCNC: 3.9 MMOL/L (ref 3.4–5.3)
PROT SERPL-MCNC: 7.5 G/DL (ref 6.8–8.8)
SODIUM SERPL-SCNC: 139 MMOL/L (ref 133–144)
TRIGL SERPL-MCNC: 111 MG/DL
TSH SERPL DL<=0.005 MIU/L-ACNC: 3.91 MU/L (ref 0.4–4)

## 2020-01-08 RX ORDER — CHOLECALCIFEROL (VITAMIN D3) 50 MCG
1 TABLET ORAL DAILY
Qty: 100 TABLET | Refills: 3 | Status: SHIPPED | OUTPATIENT
Start: 2020-01-08 | End: 2020-08-21

## 2020-01-08 RX ORDER — ROSUVASTATIN CALCIUM 10 MG/1
10 TABLET, COATED ORAL DAILY
Qty: 90 TABLET | Refills: 3 | Status: SHIPPED | OUTPATIENT
Start: 2020-01-08 | End: 2020-08-20

## 2020-01-08 RX ORDER — FERROUS SULFATE 325(65) MG
325 TABLET, DELAYED RELEASE (ENTERIC COATED) ORAL DAILY
Qty: 180 TABLET | Refills: 3 | Status: SHIPPED | OUTPATIENT
Start: 2020-01-08 | End: 2021-06-01

## 2020-01-08 RX ORDER — ERGOCALCIFEROL 1.25 MG/1
50000 CAPSULE, LIQUID FILLED ORAL WEEKLY
Qty: 8 CAPSULE | Refills: 0 | Status: SHIPPED | OUTPATIENT
Start: 2020-01-08 | End: 2020-08-20

## 2020-01-13 ENCOUNTER — HOSPITAL ENCOUNTER (OUTPATIENT)
Dept: MAMMOGRAPHY | Facility: CLINIC | Age: 65
Discharge: HOME | End: 2020-01-13
Attending: NURSE PRACTITIONER | Admitting: NURSE PRACTITIONER
Payer: COMMERCIAL

## 2020-01-13 DIAGNOSIS — Z12.31 ENCOUNTER FOR SCREENING MAMMOGRAM FOR BREAST CANCER: ICD-10-CM

## 2020-01-13 PROCEDURE — 77067 SCR MAMMO BI INCL CAD: CPT

## 2020-02-26 ENCOUNTER — APPOINTMENT (OUTPATIENT)
Dept: INTERPRETER SERVICES | Facility: CLINIC | Age: 65
End: 2020-02-26
Payer: COMMERCIAL

## 2020-02-26 ENCOUNTER — NURSE TRIAGE (OUTPATIENT)
Dept: INTERNAL MEDICINE | Facility: CLINIC | Age: 65
End: 2020-02-26

## 2020-02-26 DIAGNOSIS — Z20.828 EXPOSURE TO INFLUENZA: Primary | ICD-10-CM

## 2020-02-26 RX ORDER — OSELTAMIVIR PHOSPHATE 75 MG/1
75 CAPSULE ORAL DAILY
Qty: 7 CAPSULE | Refills: 0 | Status: SHIPPED | OUTPATIENT
Start: 2020-02-26 | End: 2020-03-04

## 2020-02-26 NOTE — TELEPHONE ENCOUNTER
Contacted patient via [place] [employer] interpretor.      Influenza-Like Illness (BERENICE) Protocol    Jennifer Fallon      Age: 64 year old     YOB: 1955    Please select the type of triage protocol you used for this patient? Epic [place] and Alex or another form of electronic triage protocol was used.     Influenza-Like Illness (BERENICE) Standing Order    Has the patient been seen by a primary care provider at an [facility] within the past two years? Yes     Do any of the following exclusions apply to the patient?  Does the patient have a history of CrCl less than or equal to 60 ml/min No   Is the patient taking Probenecid No   Was an Intranasal live attenuated influenza vaccine (LAIV) received by the patient 2 weeks before antiviral medication No   Was an LAIV received 48 hours after administration of influenza antiviral drugs, if planning on obtaining? No     Does this patient have ANY of the above exclusions answered Yes?  No.      Is this patient currently showing symptoms of Influenza like Illness (BERENICE) after close proximity to someone with a verified diagnosis of Influenza, or is this patient not sick but has had known exposure within less than or equal to 48 hours through close proximity with someone that has a verified diagnosis of Influenza?     This patient is not currently sick but has had close contact within less than or equal to 48 hours with someone who was diagnosed with Influenza     Does this patient have ANY of the following specialty conditions?  Patient has received the influenza vaccine this influenza season, unless: influenza vaccination was received in the previous 2 weeks No   Patient has received the influenza vaccine this influenza season, unless: children 6 months through 8 years of age who have not previously received two or more total doses of any trivalent or quadrivalent flu vaccine (including the nasal spray  vaccine)  No     Does this patient have ANY of the above specialty conditions? No     Adult Evaluation for Possible Influenza Treatment due to Exposure      Below are conditions which place adults at increased risk for the more severe complications of influenza.    Does this patient have ANY of the following conditions?  Is age 65 or older No   Chronic pulmonary disease such as asthma or COPD No   Heart disease (CHF, CAD, anticoagulation d/t arrhytmia, congenital heart anomaly) *HTN alone is excluded No   Kidney disease (renal failure, insufficiency or dialysis) No   Hepatic or Hematologic disorder (e.g. chronic liver disease patient, sickle cell disease) No   Diabetes (Type 1 or Type 2) Yes   Neurologic and Neurodevelopment Conditions (including disorders of the brain, spinal cord, peripheral nerve, and muscle, such as cerebral palsy, epilepsy (seizure disorders), stroke, intellectual disability, moderate to severe developmental delay, muscular dystrophy, or spinal cord injury) No   Obese with BMI >40 No   Immunosuppression caused by medications such as those taking prednisone in excess of 20mg daily, or caused by HIV/AIDS with CD4 count more than 200 No   Is pregnant, may be pregnant, or is within two weeks after delivery No   Is a resident of a [facility] care facility No   Is patient  or [place] native No   Is <19 years old and is receiving long term aspirin- or salicylate-containing medications No   Patient has a metabolic disorder No   Patient has had chemotherapy or radiation within the last 3 months No   Patient has had an organ or bone marrow transplant No   Immunosuppression: caused by medication such as those taking prednisone in excess of 20mg daily No   Immunosuppression: HIV/AIDS with CD4 count more than 200 No     Does this patient have ANY of the above conditions?  Yes     Current parent reported weight: 207 lb    Wt Readings from Last 1 Encounters:   01/07/20 93.9 kg (207 lb)       Does  "the patient require a re-weigh?No, the patient is not pediatric and doesn't require parent weight.     The patient qualifies as a patient that may benefit from an order of Tamiflu for treatment of BERENICE Exposure per the standing order.    Use SmartSet Standing Order for Influenza Exposure Treatment QD to find suggested Tamiflu order.    Note: if the patient is taking Warfarin (Coumadin), it is okay to order Tamifu if patient has a follow up with INR nurse within 3-5 days of ordering Tamiflu. Assist with scheduling to secure appointment whenever possible.    Have the patient notify their provider of:  - Development of Influenza Like Illness Symptoms  - Skin Reactions  - Transient Neuropsychiatric events (self-injury and/or delirium)  - Febrile Respiratory Illness                Additional educational resources include:    http://www.[employer]    http://www.cdc.gov/flu/  Birdie Ayala RN          Additional Information    Negative: Influenza has been diagnosed by a HCP    Negative: Influenza suspected (i.e., fever and respiratory symptoms; probable influenza exposure)    Negative: Cough and begins > 7 days after influenza EXPOSURE    Negative: Influenza EXPOSURE (close contact) within the last 7 days and fever or any respiratory symptoms (i.e., cough, runny or stuffy nose, sore throat)    Negative: Runny or stuffy nose and begins > 7 days after influenza EXPOSURE    Negative: Sore throat and begins > 7 days after influenza EXPOSURE    Negative: Patient sounds very sick or weak to the triager    Influenza EXPOSURE within last 72 hours (3 days) and exposed person is HIGH RISK (e.g., age > 64 years, pregnant, HIV+, chronic medical condition)    Answer Assessment - Initial Assessment Questions  1. TYPE of EXPOSURE: \"How were you exposed?\" (e.g., close contact, not a close contact)      Close contact with grandchild  2. DATE of EXPOSURE: \"When did the exposure occur?\" (e.g., hour, days, weeks)      2 days ago  3. " "PREGNANCY: \"Is there any chance you are pregnant?\" \"When was your last menstrual period?\"      no  4. HIGH RISK for COMPLICATIONS: \"Do you have any heart or lung problems? Do you have a weakened immune system?\" (e.g., CHF, COPD, asthma, HIV positive, chemotherapy, renal failure, diabetes mellitus, sickle cell anemia)      Diabetes  5. SYMPTOMS: \"Do you have any symptoms?\" (e.g., cough, fever, sore throat, difficulty breathing).      no    Protocols used: INFLUENZA EXPOSURE-A-OH    "

## 2020-02-26 NOTE — TELEPHONE ENCOUNTER
Patient is calling because she was exposed to the flu by her grandchild. She is not having symptoms but wondering if she should get medication.

## 2020-05-29 ENCOUNTER — APPOINTMENT (OUTPATIENT)
Dept: INTERPRETER SERVICES | Facility: CLINIC | Age: 65
End: 2020-05-29
Payer: COMMERCIAL

## 2020-06-02 ENCOUNTER — VIRTUAL VISIT (OUTPATIENT)
Dept: INTERNAL MEDICINE | Facility: CLINIC | Age: 65
End: 2020-06-02
Payer: COMMERCIAL

## 2020-06-02 ENCOUNTER — APPOINTMENT (OUTPATIENT)
Dept: INTERPRETER SERVICES | Facility: CLINIC | Age: 65
End: 2020-06-02
Payer: COMMERCIAL

## 2020-06-02 DIAGNOSIS — H93.8X3 PLUGGED FEELING IN EAR, BILATERAL: Primary | ICD-10-CM

## 2020-06-02 DIAGNOSIS — R68.89 THROAT SYMPTOM: ICD-10-CM

## 2020-06-02 PROCEDURE — T1013 SIGN LANG/ORAL INTERPRETER: HCPCS | Mod: U3

## 2020-06-02 PROCEDURE — 99213 OFFICE O/P EST LOW 20 MIN: CPT | Mod: TEL | Performed by: NURSE PRACTITIONER

## 2020-06-02 NOTE — PROGRESS NOTES
"Jennifer Fallon is a 64 year old female who is being evaluated via a billable telephone visit.      The patient has been notified of following:     \"This telephone visit will be conducted via a call between you and your physician/provider. We have found that certain health care needs can be provided without the need for a physical exam.  This service lets us provide the care you need with a short phone conversation.  If a prescription is necessary we can send it directly to your pharmacy.  If lab work is needed we can place an order for that and you can then stop by our lab to have the test done at a later time.    Telephone visits are billed at different rates depending on your insurance coverage. During this emergency period, for some insurers they may be billed the same as an in-person visit.  Please reach out to your insurance provider with any questions.    If during the course of the call the physician/provider feels a telephone visit is not appropriate, you will not be charged for this service.\"    Patient has given verbal consent for Telephone visit?  Yes    What phone number would you like to be contacted at? pentrexyl    How would you like to obtain your AVS? Mail a copy    Subjective     Jennifer Fallon is a 64 year old female who presents via phone visit today for the following health issues:    HPI     Pt. c/o having ears pain that rediate down to her throat. Been going on for about a month now. Tried taking pentrexyl for about 4x days, but didn't work     She has no fever, no cold symptoms, no sinus pressure or pain, no SOB   She cannot hear from ear and there is no pain   Throat feels itchy at times and ear itches            Patient Active Problem List   Diagnosis     Advanced directives, counseling/discussion     CARDIOVASCULAR SCREENING; LDL GOAL LESS THAN 70     Postmenopausal status     Bilateral ankle pain     Dermatophytosis of nail     Diabetes mellitus type 2 in obese (H)     " Hypothyroidism, unspecified type     Iron deficiency anemia, unspecified iron deficiency anemia type     Vitamin D deficiency     Morbid obesity (H)     Deltoid tendinitis of right shoulder     Abnormal Pap smear of cervix     Past Surgical History:   Procedure Laterality Date     CHOLECYSTECTOMY  2004     COLONOSCOPY Left 6/23/2017    Procedure: COMBINED COLONOSCOPY, SINGLE OR MULTIPLE BIOPSY/POLYPECTOMY BY BIOPSY;  COLONOSCOPY with polypectomy of colon polyp by cold biopsy  ;  Surgeon: Chang Beasley MD;  Location: [facility]     TUBAL LIGATION  1987       Social History     Tobacco Use     Smoking status: Never Smoker     Smokeless tobacco: Never Used   Substance Use Topics     Alcohol use: Yes     Alcohol/week: 0.0 standard drinks     Comment: Socially     Family History   Problem Relation Age of Onset     Diabetes Brother      Diabetes Brother      Coronary Artery Disease Mother      Breast Cancer No family hx of      Colon Cancer No family hx of      Prostate Cancer No family hx of      Other Cancer No family hx of            Reviewed and updated as needed this visit by Provider  Tobacco  Allergies  Meds  Problems  Med Hx  Surg Hx  Fam Hx         Review of Systems   Constitutional, HEENT, cardiovascular, pulmonary, gi and gu systems are negative, except as otherwise noted.       Objective   Reported vitals:  There were no vitals taken for this visit.   alert and no distress  on phone for visit   PSYCH: Alert and oriented times 3; coherent speech, normal   rate and volume, able to articulate logical thoughts, able   to abstract reason, no tangential thoughts, no hallucinations   or delusions  Her affect is normal  RESP: No cough, no audible wheezing, able to talk in full sentences  Remainder of exam unable to be completed due to telephone visits    Diagnostic Test Results:  none         Assessment/Plan:  1. Plugged feeling in ear, bilateral  Probably cerumen   Needs to be seen in person for  ear wash and assessment of ear     2. Throat symptom  May be allergy related or related to impacted cerumen       Return in about 1 day (around 6/3/2020) for ear check .      Phone call duration:  20 minutes    ARINA Dee CNP

## 2020-06-04 ENCOUNTER — OFFICE VISIT (OUTPATIENT)
Dept: INTERNAL MEDICINE | Facility: CLINIC | Age: 65
End: 2020-06-04
Payer: COMMERCIAL

## 2020-06-04 VITALS
DIASTOLIC BLOOD PRESSURE: 78 MMHG | HEIGHT: 59 IN | OXYGEN SATURATION: 97 % | RESPIRATION RATE: 18 BRPM | HEART RATE: 75 BPM | BODY MASS INDEX: 41.49 KG/M2 | TEMPERATURE: 98.5 F | SYSTOLIC BLOOD PRESSURE: 152 MMHG | WEIGHT: 205.8 LBS

## 2020-06-04 DIAGNOSIS — H69.93 DYSFUNCTION OF BOTH EUSTACHIAN TUBES: ICD-10-CM

## 2020-06-04 DIAGNOSIS — J06.9 UPPER RESPIRATORY TRACT INFECTION, UNSPECIFIED TYPE: Primary | ICD-10-CM

## 2020-06-04 PROCEDURE — 99213 OFFICE O/P EST LOW 20 MIN: CPT | Performed by: INTERNAL MEDICINE

## 2020-06-04 RX ORDER — ACETAMINOPHEN 500 MG
1000 TABLET ORAL EVERY 8 HOURS PRN
Qty: 100 TABLET | Refills: 0 | Status: SHIPPED | OUTPATIENT
Start: 2020-06-04 | End: 2021-06-01

## 2020-06-04 RX ORDER — DOXYCYCLINE HYCLATE 100 MG
100 TABLET ORAL 2 TIMES DAILY
Qty: 10 TABLET | Refills: 0 | Status: SHIPPED | OUTPATIENT
Start: 2020-06-04 | End: 2020-08-20

## 2020-06-04 ASSESSMENT — MIFFLIN-ST. JEOR: SCORE: 1394.13

## 2020-06-04 NOTE — NURSING NOTE
"BP (!) 152/78 (BP Location: Left arm, Patient Position: Sitting, Cuff Size: Adult Regular)   Pulse 75   Temp 98.5  F (36.9  C) (Oral)   Resp 18   Ht 1.499 m (4' 11\")   Wt 93.4 kg (205 lb 12.8 oz)   SpO2 97%   BMI 41.57 kg/m      "

## 2020-08-20 ENCOUNTER — OFFICE VISIT (OUTPATIENT)
Dept: INTERNAL MEDICINE | Facility: CLINIC | Age: 65
End: 2020-08-20
Payer: COMMERCIAL

## 2020-08-20 VITALS
DIASTOLIC BLOOD PRESSURE: 76 MMHG | SYSTOLIC BLOOD PRESSURE: 136 MMHG | OXYGEN SATURATION: 96 % | WEIGHT: 205 LBS | HEART RATE: 91 BPM | BODY MASS INDEX: 41.33 KG/M2 | HEIGHT: 59 IN | TEMPERATURE: 98.4 F | RESPIRATION RATE: 12 BRPM

## 2020-08-20 DIAGNOSIS — N64.4 BREAST PAIN: ICD-10-CM

## 2020-08-20 DIAGNOSIS — R09.A2 GLOBUS SENSATION: ICD-10-CM

## 2020-08-20 DIAGNOSIS — M54.2 NECK PAIN: Primary | ICD-10-CM

## 2020-08-20 DIAGNOSIS — D50.9 IRON DEFICIENCY ANEMIA, UNSPECIFIED IRON DEFICIENCY ANEMIA TYPE: ICD-10-CM

## 2020-08-20 DIAGNOSIS — E55.9 VITAMIN D DEFICIENCY: ICD-10-CM

## 2020-08-20 LAB
BASOPHILS # BLD AUTO: 0 10E9/L (ref 0–0.2)
BASOPHILS NFR BLD AUTO: 0.3 %
DIFFERENTIAL METHOD BLD: ABNORMAL
EOSINOPHIL # BLD AUTO: 0.2 10E9/L (ref 0–0.7)
EOSINOPHIL NFR BLD AUTO: 3.4 %
ERYTHROCYTE [DISTWIDTH] IN BLOOD BY AUTOMATED COUNT: 15.9 % (ref 10–15)
HCT VFR BLD AUTO: 38.1 % (ref 35–47)
HGB BLD-MCNC: 12.2 G/DL (ref 11.7–15.7)
LYMPHOCYTES # BLD AUTO: 1.6 10E9/L (ref 0.8–5.3)
LYMPHOCYTES NFR BLD AUTO: 26.9 %
MCH RBC QN AUTO: 27.7 PG (ref 26.5–33)
MCHC RBC AUTO-ENTMCNC: 32 G/DL (ref 31.5–36.5)
MCV RBC AUTO: 86 FL (ref 78–100)
MONOCYTES # BLD AUTO: 0.6 10E9/L (ref 0–1.3)
MONOCYTES NFR BLD AUTO: 10.7 %
NEUTROPHILS # BLD AUTO: 3.4 10E9/L (ref 1.6–8.3)
NEUTROPHILS NFR BLD AUTO: 58.7 %
PLATELET # BLD AUTO: 167 10E9/L (ref 150–450)
RBC # BLD AUTO: 4.41 10E12/L (ref 3.8–5.2)
WBC # BLD AUTO: 5.8 10E9/L (ref 4–11)

## 2020-08-20 PROCEDURE — 36415 COLL VENOUS BLD VENIPUNCTURE: CPT | Performed by: NURSE PRACTITIONER

## 2020-08-20 PROCEDURE — 99214 OFFICE O/P EST MOD 30 MIN: CPT | Performed by: NURSE PRACTITIONER

## 2020-08-20 PROCEDURE — T1013 SIGN LANG/ORAL INTERPRETER: HCPCS | Mod: U3 | Performed by: NURSE PRACTITIONER

## 2020-08-20 PROCEDURE — 82728 ASSAY OF FERRITIN: CPT | Performed by: NURSE PRACTITIONER

## 2020-08-20 PROCEDURE — 85025 COMPLETE CBC W/AUTO DIFF WBC: CPT | Performed by: NURSE PRACTITIONER

## 2020-08-20 PROCEDURE — 83540 ASSAY OF IRON: CPT | Performed by: NURSE PRACTITIONER

## 2020-08-20 PROCEDURE — 82306 VITAMIN D 25 HYDROXY: CPT | Performed by: NURSE PRACTITIONER

## 2020-08-20 PROCEDURE — 83550 IRON BINDING TEST: CPT | Performed by: NURSE PRACTITIONER

## 2020-08-20 RX ORDER — CYCLOBENZAPRINE HCL 10 MG
10 TABLET ORAL 3 TIMES DAILY PRN
Qty: 30 TABLET | Refills: 1 | Status: SHIPPED | OUTPATIENT
Start: 2020-08-20 | End: 2021-06-01

## 2020-08-20 RX ORDER — ALBUTEROL SULFATE 90 UG/1
2 AEROSOL, METERED RESPIRATORY (INHALATION) EVERY 6 HOURS
Qty: 1 INHALER | Refills: 1 | Status: SHIPPED | OUTPATIENT
Start: 2020-08-20 | End: 2021-06-01

## 2020-08-20 ASSESSMENT — MIFFLIN-ST. JEOR: SCORE: 1385.5

## 2020-08-20 NOTE — LETTER
August 26, 2020      Jennifer Fallon  [address]    [place] [address]        Dear Ms.Perez Fallon,    We are writing to inform you of your test results.    Lab acceptable   Vit D improved but still low   Would increase vit D to 4000 units daily     HGB ok and iron in better range   Would continue the iron once daily     Resulted Orders   CBC with platelets and differential   Result Value Ref Range    WBC 5.8 4.0 - 11.0 10e9/L    RBC Count 4.41 3.8 - 5.2 10e12/L    Hemoglobin 12.2 11.7 - 15.7 g/dL    Hematocrit 38.1 35.0 - 47.0 %    MCV 86 78 - 100 fl    MCH 27.7 26.5 - 33.0 pg    MCHC 32.0 31.5 - 36.5 g/dL    RDW 15.9 (H) 10.0 - 15.0 %      Comment:      Results confirmed by repeat test    Platelet Count 167 150 - 450 10e9/L    % Neutrophils 58.7 %    % Lymphocytes 26.9 %    % Monocytes 10.7 %    % Eosinophils 3.4 %    % Basophils 0.3 %    Absolute Neutrophil 3.4 1.6 - 8.3 10e9/L    Absolute Lymphocytes 1.6 0.8 - 5.3 10e9/L    Absolute Monocytes 0.6 0.0 - 1.3 10e9/L    Absolute Eosinophils 0.2 0.0 - 0.7 10e9/L    Absolute Basophils 0.0 0.0 - 0.2 10e9/L    Diff Method Automated Method    Ferritin   Result Value Ref Range    Ferritin 23 8 - 252 ng/mL   Iron and iron binding capacity   Result Value Ref Range    Iron 80 35 - 180 ug/dL    Iron Binding Cap 375 240 - 430 ug/dL    Iron Saturation Index 21 15 - 46 %   Vitamin D Deficiency   Result Value Ref Range    Vitamin D Deficiency screening 22 20 - 75 ug/L      Comment:      Season, race, dietary intake, and treatment affect the concentration of   25-hydroxy-Vitamin D. Values may decrease during winter months and increase   during summer months. Values 20-29 ug/L may indicate Vitamin D insufficiency   and values <20 ug/L may indicate Vitamin D deficiency.  Vitamin D determination is routinely performed by an immunoassay specific for   25 hydroxyvitamin D3.  If an individual is on vitamin D2 (ergocalciferol)   supplementation, please specify 25  OH vitamin D2 and D3 level determination by   LCMSMS test [id number].         If you have any questions or concerns, please call the clinic at the number listed above.       Sincerely,        ARINA Dee,CNP.

## 2020-08-20 NOTE — PROGRESS NOTES
Subjective     Jennifer Fallon is a 65 year old female who presents to clinic today for the following health issues: breast pain since July 30, 2020, pain has been intermittent. Pain is also in neck area.    HPI       Breast Concern  Onset/Duration: lat week xx 4 days  Description:   Location: R lateral breast pain  Pain or tenderness: YES  Redness:  no   Intensity: mild  Progression of Symptoms: resolved  Accompanying Signs & Symptoms:  Any lumps in axillary region:  no   Movable:  no   Nipple discharge: none  Changes in the skin or nipple: no  On Hormone therapy:  no   Does it change with menstrual cycle: not applicable  Previous history of similar problem: no  First degree relative with breast cancer: a negative family history for breast cancer.  Precipitating factors:           Worsened by: none  Alleviating factors:            Improved by: none  Therapies tried and outcome: None  No LMP recorded. Patient is postmenopausal.    Neck Pain  Onset/Duration: 2 months  Description:   Location: posterior neck and upper back  Radiation: none  Intensity: mild  Progression of Symptoms:  intermittent and waxing and waning  Accompanying Signs & Symptoms:  Burning, tingling, prickly sensation in arm(s): no  Numbness in arm(s): no  Weakness in arm(s):  no  Fever: no  Headache: no  Nausea and/or vomiting: no  History:   Trauma: no  Previous neck pain: no  Previous surgery or injections: no  Previous Imaging (MRI,X ray): no  Precipitating or alleviating factors: None  Does movement impact the pain:  no  Therapies tried and outcome: rest/inactivity, heat, massage and NSAID -   Concern - tightness in throat/lungs  Onset: weeks  Description: hard to catch breath,  Makes her anxious  Intensity: moderate  Progression of Symptoms:  waxing and waning  Accompanying Signs & Symptoms: none  Previous history of similar problem: amoxicillin did not help  Precipitating factors:        Worsened by: wearing mask  Alleviating factors:        " Improved by: omeprazole  Therapies tried and outcome:  none       Review of Systems   CONSTITUTIONAL: NEGATIVE for fever, chills, change in weight  ENT/MOUTH: NEGATIVE for ear, mouth and throat problems  RESP: NEGATIVE for significant cough or SOB  BREAST: NEGATIVE for masses, tenderness or discharge  CV: NEGATIVE for chest pain, palpitations or peripheral edema  MUSCULOSKELETAL: NEGATIVE for significant arthralgias or myalgia      Objective    /76   Pulse 91   Temp 98.4  F (36.9  C) (Oral)   Resp 12   Ht 1.499 m (4' 11\")   Wt 93 kg (205 lb)   SpO2 96%   Breastfeeding No   BMI 41.40 kg/m    Body mass index is 41.4 kg/m .  Physical Exam   GENERAL: alert, no distress and obese  NECK: no adenopathy, no asymmetry, masses, or scars and thyroid normal to palpation  NECK: posterior cervical muscle tension and muscle knots otherwise full ROM  RESP: lungs clear to auscultation - no rales, rhonchi or wheezes  BREAST: normal without masses, tenderness or nipple discharge and no palpable axillary masses or adenopathy  CV: regular rate and rhythm, normal S1 S2, no S3 or S4, no murmur, click or rub, no peripheral edema and peripheral pulses strong  ABDOMEN: soft, nontender, no hepatosplenomegaly, no masses and bowel sounds normal  MS: extremities normal- no gross deformities noted  PSYCH: mentation appears normal and anxious            Assessment & Plan     (M54.2) Neck pain  (primary encounter diagnosis)  Comment: Plan: cyclobenzaprine (FLEXERIL) 10 MG tablet            (D50.9) Iron deficiency anemia, unspecified iron deficiency anemia type  Comment:   Plan:   (E55.9) Vitamin D deficiency  Comment:   Plan:     (R09.89) Globus sensation  Comment:   Plan: albuterol (PROAIR HFA/PROVENTIL HFA/VENTOLIN         HFA) 108 (90 Base) MCG/ACT inhaler        omprazole daily    (N64.4) Breast pain  Comment:   Plan: monitor and yearly mammograms         BMI:   Estimated body mass index is 41.4 kg/m  as calculated from the " "following:    Height as of this encounter: 1.499 m (4' 11\").    Weight as of this encounter: 93 kg (205 lb).               Shilpa Camejo NP  [facility]      "

## 2020-08-21 DIAGNOSIS — E55.9 VITAMIN D DEFICIENCY: ICD-10-CM

## 2020-08-21 LAB
DEPRECATED CALCIDIOL+CALCIFEROL SERPL-MC: 22 UG/L (ref 20–75)
FERRITIN SERPL-MCNC: 23 NG/ML (ref 8–252)
IRON SATN MFR SERPL: 21 % (ref 15–46)
IRON SERPL-MCNC: 80 UG/DL (ref 35–180)
TIBC SERPL-MCNC: 375 UG/DL (ref 240–430)

## 2020-08-21 RX ORDER — CHOLECALCIFEROL (VITAMIN D3) 50 MCG
2 TABLET ORAL DAILY
Qty: 200 TABLET | Refills: 3 | Status: SHIPPED | OUTPATIENT
Start: 2020-08-21 | End: 2021-06-01

## 2020-10-06 ENCOUNTER — APPOINTMENT (OUTPATIENT)
Dept: INTERPRETER SERVICES | Facility: CLINIC | Age: 65
End: 2020-10-06
Payer: COMMERCIAL

## 2020-10-23 ENCOUNTER — ALLIED HEALTH/NURSE VISIT (OUTPATIENT)
Dept: NURSING | Facility: CLINIC | Age: 65
End: 2020-10-23
Payer: COMMERCIAL

## 2020-10-23 DIAGNOSIS — Z23 NEED FOR VACCINATION: Primary | ICD-10-CM

## 2020-10-23 DIAGNOSIS — Z23 NEED FOR PROPHYLACTIC VACCINATION AND INOCULATION AGAINST INFLUENZA: ICD-10-CM

## 2020-10-23 PROCEDURE — 90682 RIV4 VACC RECOMBINANT DNA IM: CPT

## 2020-10-23 PROCEDURE — 90471 IMMUNIZATION ADMIN: CPT

## 2020-11-08 ENCOUNTER — APPOINTMENT (OUTPATIENT)
Dept: GENERAL RADIOLOGY | Facility: CLINIC | Age: 65
End: 2020-11-08
Attending: EMERGENCY MEDICINE
Payer: COMMERCIAL

## 2020-11-08 ENCOUNTER — HOSPITAL ENCOUNTER (EMERGENCY)
Facility: CLINIC | Age: 65
Discharge: HOME | End: 2020-11-08
Attending: EMERGENCY MEDICINE | Admitting: EMERGENCY MEDICINE
Payer: COMMERCIAL

## 2020-11-08 ENCOUNTER — APPOINTMENT (OUTPATIENT)
Dept: CT IMAGING | Facility: CLINIC | Age: 65
End: 2020-11-08
Attending: EMERGENCY MEDICINE
Payer: COMMERCIAL

## 2020-11-08 VITALS
HEART RATE: 73 BPM | TEMPERATURE: 97.9 F | DIASTOLIC BLOOD PRESSURE: 70 MMHG | RESPIRATION RATE: 18 BRPM | SYSTOLIC BLOOD PRESSURE: 131 MMHG | OXYGEN SATURATION: 100 %

## 2020-11-08 DIAGNOSIS — S22.42XA CLOSED FRACTURE OF MULTIPLE RIBS OF LEFT SIDE, INITIAL ENCOUNTER: ICD-10-CM

## 2020-11-08 LAB
ALBUMIN SERPL-MCNC: 3 G/DL (ref 3.4–5)
ALP SERPL-CCNC: 123 U/L (ref 40–150)
ALT SERPL W P-5'-P-CCNC: 31 U/L (ref 0–50)
ANION GAP SERPL CALCULATED.3IONS-SCNC: 5 MMOL/L (ref 3–14)
AST SERPL W P-5'-P-CCNC: 20 U/L (ref 0–45)
BASOPHILS # BLD AUTO: 0 10E9/L (ref 0–0.2)
BASOPHILS NFR BLD AUTO: 0.4 %
BILIRUB SERPL-MCNC: 0.2 MG/DL (ref 0.2–1.3)
BUN SERPL-MCNC: 16 MG/DL (ref 7–30)
CALCIUM SERPL-MCNC: 8.1 MG/DL (ref 8.5–10.1)
CHLORIDE SERPL-SCNC: 109 MMOL/L (ref 94–109)
CO2 SERPL-SCNC: 25 MMOL/L (ref 20–32)
CREAT SERPL-MCNC: 0.8 MG/DL (ref 0.52–1.04)
DIFFERENTIAL METHOD BLD: ABNORMAL
EOSINOPHIL # BLD AUTO: 0.1 10E9/L (ref 0–0.7)
EOSINOPHIL NFR BLD AUTO: 1.4 %
ERYTHROCYTE [DISTWIDTH] IN BLOOD BY AUTOMATED COUNT: 14.8 % (ref 10–15)
GFR SERPL CREATININE-BSD FRML MDRD: 78 ML/MIN/{1.73_M2}
GLUCOSE SERPL-MCNC: 152 MG/DL (ref 70–99)
HCT VFR BLD AUTO: 40 % (ref 35–47)
HGB BLD-MCNC: 11.7 G/DL (ref 11.7–15.7)
IMM GRANULOCYTES # BLD: 0 10E9/L (ref 0–0.4)
IMM GRANULOCYTES NFR BLD: 0.4 %
LIPASE SERPL-CCNC: 142 U/L (ref 73–393)
LYMPHOCYTES # BLD AUTO: 2 10E9/L (ref 0.8–5.3)
LYMPHOCYTES NFR BLD AUTO: 20.1 %
MCH RBC QN AUTO: 26.3 PG (ref 26.5–33)
MCHC RBC AUTO-ENTMCNC: 29.3 G/DL (ref 31.5–36.5)
MCV RBC AUTO: 90 FL (ref 78–100)
MONOCYTES # BLD AUTO: 0.5 10E9/L (ref 0–1.3)
MONOCYTES NFR BLD AUTO: 4.5 %
NEUTROPHILS # BLD AUTO: 7.3 10E9/L (ref 1.6–8.3)
NEUTROPHILS NFR BLD AUTO: 73.2 %
NRBC # BLD AUTO: 0 10*3/UL
NRBC BLD AUTO-RTO: 0 /100
PLATELET # BLD AUTO: 273 10E9/L (ref 150–450)
POTASSIUM SERPL-SCNC: 4.2 MMOL/L (ref 3.4–5.3)
PROT SERPL-MCNC: 7.7 G/DL (ref 6.8–8.8)
RBC # BLD AUTO: 4.45 10E12/L (ref 3.8–5.2)
SODIUM SERPL-SCNC: 139 MMOL/L (ref 133–144)
TROPONIN I SERPL-MCNC: <0.015 UG/L (ref 0–0.04)
WBC # BLD AUTO: 10 10E9/L (ref 4–11)

## 2020-11-08 PROCEDURE — 84484 ASSAY OF TROPONIN QUANT: CPT | Performed by: EMERGENCY MEDICINE

## 2020-11-08 PROCEDURE — 93005 ELECTROCARDIOGRAM TRACING: CPT

## 2020-11-08 PROCEDURE — 96375 TX/PRO/DX INJ NEW DRUG ADDON: CPT

## 2020-11-08 PROCEDURE — 71275 CT ANGIOGRAPHY CHEST: CPT

## 2020-11-08 PROCEDURE — 250N000011 HC RX IP 250 OP 636: Performed by: EMERGENCY MEDICINE

## 2020-11-08 PROCEDURE — 99285 EMERGENCY DEPT VISIT HI MDM: CPT | Mod: 25

## 2020-11-08 PROCEDURE — 85025 COMPLETE CBC W/AUTO DIFF WBC: CPT | Performed by: EMERGENCY MEDICINE

## 2020-11-08 PROCEDURE — 71045 X-RAY EXAM CHEST 1 VIEW: CPT

## 2020-11-08 PROCEDURE — 96374 THER/PROPH/DIAG INJ IV PUSH: CPT | Mod: 59

## 2020-11-08 PROCEDURE — 80053 COMPREHEN METABOLIC PANEL: CPT | Performed by: EMERGENCY MEDICINE

## 2020-11-08 PROCEDURE — 83690 ASSAY OF LIPASE: CPT | Performed by: EMERGENCY MEDICINE

## 2020-11-08 RX ORDER — IOPAMIDOL 755 MG/ML
68 INJECTION, SOLUTION INTRAVASCULAR ONCE
Status: COMPLETED | OUTPATIENT
Start: 2020-11-08 | End: 2020-11-08

## 2020-11-08 RX ORDER — ONDANSETRON 2 MG/ML
4 INJECTION INTRAMUSCULAR; INTRAVENOUS EVERY 30 MIN PRN
Status: DISCONTINUED | OUTPATIENT
Start: 2020-11-08 | End: 2020-11-08 | Stop reason: HOSPADM

## 2020-11-08 RX ORDER — OXYCODONE HYDROCHLORIDE 5 MG/1
5 TABLET ORAL EVERY 6 HOURS PRN
Qty: 12 TABLET | Refills: 0 | Status: SHIPPED | OUTPATIENT
Start: 2020-11-08 | End: 2021-06-01

## 2020-11-08 RX ORDER — IOPAMIDOL 755 MG/ML
100 INJECTION, SOLUTION INTRAVASCULAR ONCE
Status: COMPLETED | OUTPATIENT
Start: 2020-11-08 | End: 2020-11-08

## 2020-11-08 RX ORDER — HYDROMORPHONE HYDROCHLORIDE 1 MG/ML
0.5 INJECTION, SOLUTION INTRAMUSCULAR; INTRAVENOUS; SUBCUTANEOUS
Status: DISCONTINUED | OUTPATIENT
Start: 2020-11-08 | End: 2020-11-08 | Stop reason: HOSPADM

## 2020-11-08 RX ADMIN — ONDANSETRON 4 MG: 2 INJECTION INTRAMUSCULAR; INTRAVENOUS at 13:52

## 2020-11-08 RX ADMIN — HYDROMORPHONE HYDROCHLORIDE 0.5 MG: 1 INJECTION, SOLUTION INTRAMUSCULAR; INTRAVENOUS; SUBCUTANEOUS at 13:52

## 2020-11-08 RX ADMIN — IOPAMIDOL 100 ML: 755 INJECTION, SOLUTION INTRAVENOUS at 14:41

## 2020-11-08 RX ADMIN — IOPAMIDOL 68 ML: 755 INJECTION, SOLUTION INTRAVENOUS at 15:07

## 2020-11-08 ASSESSMENT — ENCOUNTER SYMPTOMS
FEVER: 0
VOMITING: 0
SHORTNESS OF BREATH: 1
COUGH: 0
DIARRHEA: 0

## 2020-11-08 NOTE — ED AVS SNAPSHOT
[facility] Emergency Dept  [address]  [facility] [address]  Phone: [phone]  Fax: [phone]                                    Jennifer Fallon   MRN: [id number]    Department: [facility] Emergency Dept   Date of Visit: 11/8/2020           After Visit Summary Signature Page    I have received my discharge instructions, and my questions have been answered. I have discussed any challenges I see with this plan with the nurse or doctor.    ..........................................................................................................................................  Patient/Patient Representative Signature      ..........................................................................................................................................  Patient Representative Print Name and Relationship to Patient    ..................................................               ................................................  Date                                   Time    ..........................................................................................................................................  Reviewed by Signature/Title    ...................................................              ..............................................  Date                                               Time          22EPIC Rev 08/18

## 2020-11-08 NOTE — ED TRIAGE NOTES
Patient presents with abdominal pain to LUQ that started that started 8 days ago that getting worse. No nausea, vomiting or diarrhea.

## 2020-11-08 NOTE — ED PROVIDER NOTES
History   Chief Complaint  Left sided chest pain     The history is provided by the patient. The history is limited by a language barrier. A  was used (Turkish Ipad ).      Jennifer Fallon is a 65 year old female with a history of hypothyroidism who presents for evaluation of 8 days of pain below her left breast that has significantly worsened in the last day. She states that the pain feels as if she has injured a rib, but denies any known trauma to the area. She does has some accompanied shortness of breath with the pain. She often feels like she wants to get up and walk around to alleviate the pain, but is unable to. She denies any infectious symptoms including fever or cough. She also denies any GI symptoms including emesis or diarrhea. Jennifer took Tylenol this morning for the pain, but has not felt any improvement. She has not been seen in the past week for this pain.     Allergies  No Known Allergies    Medications  Albuterol inhaler  Levothyroxine    Past Medical History  Diabetes  Hypothyroidism     Past Surgical History  Cholecystectomy  Tubal ligation     Family History  Brother - diabetes   Mother - CAD     Social History  The patient was unaccompanied to the ED.  Smoking Status: never  Smokeless Tobacco: never  Alcohol Use: socially  Drug Use: no    Review of Systems   Constitutional: Negative for fever.   Respiratory: Positive for shortness of breath. Negative for cough.    Cardiovascular: Positive for chest pain.   Gastrointestinal: Negative for diarrhea and vomiting.   All other systems reviewed and are negative.    Physical Exam     Patient Vitals for the past 24 hrs:   BP Temp Temp src Pulse Resp SpO2   11/08/20 1600 131/70 -- -- 73 -- 100 %   11/08/20 1530 (!) 147/68 -- -- -- -- 100 %   11/08/20 1430 (!) 127/92 -- -- 70 -- 100 %   11/08/20 1400 132/77 -- -- 72 -- 100 %   11/08/20 1242 (!) 158/76 97.9  F (36.6  C) Temporal 66 18 99 %       Physical Exam  General:  uncomfortable.   HENT:  Normal appearance.   Eyes: EOMI. Normal conjunctiva.  Neck:  Normal range of motion and appearance.   Cardiovascular:  Normal rate, regular rhythm and normal heart sounds.   Pulmonary/Chest:  Effort normal. Tender left anterior chest wall inferior to breast.   Abdominal: Soft. No distension or tenderness.     Musculoskeletal: Normal range of motion. No edema or tenderness.   Neurological: oriented, normal strength, sensation, and coordination.   Skin: Warm and dry. No rash or bruising.   Psychiatric: Normal mood and affect. Normal behavior and judgement.      Emergency Department Course   EKG  Indication: chest pain  Time: 1335  Rate 66 bpm. FL interval 136. QRS duration 84. QT/QTc 420/440. P-R-T axes 9 1 14.   Normal sinus rhythm  Normal ECG    Read time: 1340  Read by Cj Payne MD    Imaging:  Radiology findings were communicated with the patient who voiced understanding of the findings.    XR chest port 1 view:  IMPRESSION: Portable chest. Lungs are clear. Heart is normal in size.  No pneumothorax. No definite pleural effusions.    CT chest PE abdomen pelvis w contrast:  IMPRESSION:  1.  No evidence of pulmonary embolism. Thoracic and abdominal aorta  are unremarkable.  2.  Lungs are clear. No pleural fluid. Slight irregularity along the  outer cortex of the anterior left third and fifth ribs. Subtle  cortical fractures are possible. No displaced rib fracture.  3.  No acute changes in the abdomen or pelvis to account for patient's  symptoms.    Readings per Radiology    Laboratory:  Laboratory findings were communicated with the patient who voiced understanding of the findings.    Lipase: 142   Troponin (Collected 1330): <0.015  CBC: AWNL (WBC 10.0, HGB 11.7, )  CMP: glucose 152 (H), calcium 8.1 (L), albumin 3.0 (L) o/w WNL (Creatinine 0.80)    Interventions:  1352 Zofran 4 mg IV  1352 Dilaudid 0.5 mg IV    Emergency Department Course:  Past medical records, nursing notes,  and vitals reviewed.    1325 I physically examined the patient as documented above.    EKG obtained in the ED, see results above.     IV was inserted and blood was drawn for laboratory testing, results above.    The patient was sent for radiographs while in the emergency department, results above.     1626 I rechecked the patient and discussed the findings of their workup thus far.     Findings and plan explained to the Patient. Patient discharged home with instructions regarding supportive care, medications, and reasons to return. The importance of close follow-up was reviewed. The patient was prescribed Oxycodone.     I personally reviewed the laboratory and imaging results with the Patient and answered all related questions prior to discharge.     Impression & Plan   Medical Decision Making:  Afebrile 65-year-old non-English-speaking  female arrives by car with complaints of 8 days of left-sided lower anterior chest discomfort.  She has had no recent fevers or cough.  No history of trauma.  She does have some shortness of breath notes that the pain is worse with breathing or moving and states that it feels like she injured a rib.  She is hemodynamically stable with a normal cardiopulmonary examination.  Questionable whether there is some left upper quadrant tenderness versus anterior chest wall tenderness.  Evaluation has included initially an EKG which simply depicts a sinus rhythm with no ischemic changes or sign of pericarditis.  She has a normal portable chest x-ray and unremarkable laboratory tests.  These include an undetectable troponin and normal CBC and CMP.  Lipase is normal.  CT chest abdomen pelvis was undertaken to exclude pulmonary embolism or other intrathoracic or intra-abdominal pathology to account for her symptoms.  Interestingly V-scan shows what appears to be slight irregularity along the outer cortex of the anterior left third and fifth ribs possibly subtle cortical fractures.  There  is no PE or other explanation for her symptoms.  She was medicated with IV Zofran and Dilaudid and test results discussed with her.  An iPad  was utilized for history taking and other communication.  Symptomatic treatment with as needed medications is being given and I have recommended recheck through clinic in a week or so if symptoms are not gradually improving.      Diagnosis:    ICD-10-CM    1. Closed fracture of multiple ribs of left side, initial encounter  S22.42XA      Disposition:  Discharged to home.    Discharge Medications:  New Prescriptions    OXYCODONE (ROXICODONE) 5 MG TABLET    Take 1 tablet (5 mg) by mouth every 6 hours as needed for pain       Scribe Disclosure:  I, Anneliese Lim, am serving as a scribe at 1:25 PM on 11/8/2020 to document services personally performed by Cj Payne MD based on my observations and the provider's statements to me.      Cj Payne MD  11/09/20 0889

## 2020-11-08 NOTE — LETTER
November 9, 2020      To Whom It May Concern:      Jennifer Fallon was seen in our Emergency Department today, 11/08/20.  I expect her condition to improve over the next 3 days.  She may return to work once cleared by her primary care doctor.      Sincerely,      Cj Payne MD/av

## 2020-11-09 LAB — INTERPRETATION ECG - MUSE: NORMAL

## 2020-11-09 NOTE — ED NOTES
Helped pt call [place] for follow up appointment for tomorrow and gave work note via  ipdad. No further questions.

## 2020-11-10 ENCOUNTER — VIRTUAL VISIT (OUTPATIENT)
Dept: INTERNAL MEDICINE | Facility: CLINIC | Age: 65
End: 2020-11-10
Payer: COMMERCIAL

## 2020-11-10 DIAGNOSIS — R07.81 RIB PAIN: Primary | ICD-10-CM

## 2020-11-10 PROCEDURE — 99213 OFFICE O/P EST LOW 20 MIN: CPT | Mod: TEL | Performed by: NURSE PRACTITIONER

## 2020-11-10 NOTE — PROGRESS NOTES
"Jennifer Fallon is a 65 year old female who is being evaluated via a billable telephone visit.      The patient has been notified of following:     \"This telephone visit will be conducted via a call between you and your physician/provider. We have found that certain health care needs can be provided without the need for a physical exam.  This service lets us provide the care you need with a short phone conversation.  If a prescription is necessary we can send it directly to your pharmacy.  If lab work is needed we can place an order for that and you can then stop by our lab to have the test done at a later time.    Telephone visits are billed at different rates depending on your insurance coverage. During this emergency period, for some insurers they may be billed the same as an in-person visit.  Please reach out to your insurance provider with any questions.    If during the course of the call the physician/provider feels a telephone visit is not appropriate, you will not be charged for this service.\"    Patient has given verbal consent for Telephone visit?  Yes    What phone number would you like to be contacted at? [phone]    How would you like to obtain your AVS? Mail a copy    Subjective     Jennifer Fallon is a 65 year old female who presents via phone visit today for the following health issues:    HPI     ED/UC Followup:    Facility:  [facility]  Date of visit: 11/8/20  Reason for visit: rib pain  Current Status: persistent rib pain, unknown injury, applying ice, taking oxycodone.  Was recommended 2 weeks off work as .                Review of Systems   Constitutional, HEENT, cardiovascular, pulmonary, gi and gu systems are negative, except as otherwise noted.       Objective          Vitals:  No vitals were obtained today due to virtual visit.      PSYCH: Alert and oriented times 3; coherent speech, normal   rate and volume, able to articulate logical thoughts, able   to abstract " "reason, no tangential thoughts, no hallucinations   or delusions  Her affect is normal  RESP: No cough, no audible wheezing, able to talk in full sentences  Remainder of exam unable to be completed due to telephone visits            Assessment/Plan:    Assessment & Plan     Rib pain  NSAIDs, heat/ice, rib belt/ace wrap  RTW letter done.       BMI:   Estimated body mass index is 41.4 kg/m  as calculated from the following:    Height as of 8/20/20: 1.499 m (4' 11\").    Weight as of 8/20/20: 93 kg (205 lb).                Shilpa Camejo NP  [facility]    Phone call duration:  14 minutes                "

## 2020-11-10 NOTE — LETTER
November 10, 2020      Jennifer Fallon  [address]    [facility] [place] [address]        To Whom It May Concern:    Jennifer Fallon was seen in our ER on 11/8/20. She will be off work for 2 weeks due to rib pain.      Sincerely,        Shilpa Camejo NP

## 2020-11-23 ENCOUNTER — APPOINTMENT (OUTPATIENT)
Dept: INTERPRETER SERVICES | Facility: CLINIC | Age: 65
End: 2020-11-23
Payer: COMMERCIAL

## 2020-11-24 ENCOUNTER — VIRTUAL VISIT (OUTPATIENT)
Dept: INTERNAL MEDICINE | Facility: CLINIC | Age: 65
End: 2020-11-24
Payer: COMMERCIAL

## 2020-11-24 DIAGNOSIS — S22.49XD CLOSED FRACTURE OF MULTIPLE RIBS WITH ROUTINE HEALING, UNSPECIFIED LATERALITY, SUBSEQUENT ENCOUNTER: Primary | ICD-10-CM

## 2020-11-24 PROCEDURE — 99213 OFFICE O/P EST LOW 20 MIN: CPT | Mod: TEL | Performed by: NURSE PRACTITIONER

## 2020-11-24 NOTE — PROGRESS NOTES
"Jennifer Fallon is a 65 year old female who is being evaluated via a billable telephone visit.      The patient has been notified of following:     \"This telephone visit will be conducted via a call between you and your physician/provider. We have found that certain health care needs can be provided without the need for a physical exam.  This service lets us provide the care you need with a short phone conversation.  If a prescription is necessary we can send it directly to your pharmacy.  If lab work is needed we can place an order for that and you can then stop by our lab to have the test done at a later time.    Telephone visits are billed at different rates depending on your insurance coverage. During this emergency period, for some insurers they may be billed the same as an in-person visit.  Please reach out to your insurance provider with any questions.    If during the course of the call the physician/provider feels a telephone visit is not appropriate, you will not be charged for this service.\"    Patient has given verbal consent for Telephone visit?  Yes    What phone number would you like to be contacted at? [phone], needs  [phone]    How would you like to obtain your AVS? Mail a copy    Subjective     Jennifer Fallon is a 65 year old female who presents via phone visit today for the following health issues:    HPI     Rib fx   Pain is improved   Sleep is still harder   Bending is hard   She is still taking oxycodone as needed for pain   She wants to see someone in person before return to work, to make sure she is ok to return    Made appointment with Dr Shanks Friday for exam and completion of paperwork for RTW    She wants to return to work on the 30 th if all is ok            Review of Systems   Constitutional, HEENT, cardiovascular, pulmonary, GI, , musculoskeletal, neuro, skin, endocrine and psych systems are negative, except as otherwise noted.       Objective    " "      Vitals:  No vitals were obtained today due to virtual visit.    alert and no distress  PSYCH: Alert and oriented times 3; coherent speech, normal   rate and volume, able to articulate logical thoughts, able   to abstract reason, no tangential thoughts, no hallucinations   or delusions  Her affect is normal  RESP: No cough, no audible wheezing, able to talk in full sentences  Remainder of exam unable to be completed due to telephone visits    Reviewed previous notes         Assessment/Plan:    Assessment & Plan     Closed fracture of multiple ribs with routine healing, unspecified laterality, subsequent encounter  Improved   She has a physical job and unsure if she will be able to participate fully in her job  She wants to be seen on person before RTW to make sure she is ok   Appointment made for this Friday with Dr Shanks        BMI:   Estimated body mass index is 41.4 kg/m  as calculated from the following:    Height as of 8/20/20: 1.499 m (4' 11\").    Weight as of 8/20/20: 93 kg (205 lb).            Patient Instructions   Dr Shanks  Friday at 9 am 11/27/2020  For return to work assessment and to complete forms       No follow-ups on file.    ARINA Dee CNP  [facility]    Phone call duration:  30 minutes                "

## 2020-11-27 ENCOUNTER — OFFICE VISIT (OUTPATIENT)
Dept: INTERNAL MEDICINE | Facility: CLINIC | Age: 65
End: 2020-11-27
Payer: COMMERCIAL

## 2020-11-27 VITALS
TEMPERATURE: 98.4 F | BODY MASS INDEX: 42.62 KG/M2 | HEART RATE: 92 BPM | WEIGHT: 211.4 LBS | SYSTOLIC BLOOD PRESSURE: 152 MMHG | DIASTOLIC BLOOD PRESSURE: 81 MMHG | OXYGEN SATURATION: 97 % | HEIGHT: 59 IN | RESPIRATION RATE: 20 BRPM

## 2020-11-27 DIAGNOSIS — R35.0 URINARY FREQUENCY: ICD-10-CM

## 2020-11-27 DIAGNOSIS — S22.42XD CLOSED FRACTURE OF MULTIPLE RIBS OF LEFT SIDE WITH ROUTINE HEALING, SUBSEQUENT ENCOUNTER: Primary | ICD-10-CM

## 2020-11-27 LAB
ALBUMIN UR-MCNC: NEGATIVE MG/DL
APPEARANCE UR: CLEAR
BILIRUB UR QL STRIP: NEGATIVE
COLOR UR AUTO: YELLOW
GLUCOSE UR STRIP-MCNC: NEGATIVE MG/DL
HGB UR QL STRIP: NEGATIVE
KETONES UR STRIP-MCNC: NEGATIVE MG/DL
LEUKOCYTE ESTERASE UR QL STRIP: NEGATIVE
NITRATE UR QL: NEGATIVE
PH UR STRIP: 5.5 PH (ref 5–7)
SOURCE: NORMAL
SP GR UR STRIP: 1.02 (ref 1–1.03)
UROBILINOGEN UR STRIP-ACNC: 0.2 EU/DL (ref 0.2–1)

## 2020-11-27 PROCEDURE — 81003 URINALYSIS AUTO W/O SCOPE: CPT | Performed by: INTERNAL MEDICINE

## 2020-11-27 PROCEDURE — 99213 OFFICE O/P EST LOW 20 MIN: CPT | Performed by: INTERNAL MEDICINE

## 2020-11-27 ASSESSMENT — MIFFLIN-ST. JEOR: SCORE: 1414.53

## 2020-11-27 NOTE — PROGRESS NOTES
Subjective     Jennifer Fallon is a 65 year old female who presents to clinic today for the following health issues:    HPI      Follow-up of left anterior rib fractures: She was seen in the ED for this originally 11/8/2020.  She had virtual visits on 11/10 and 11/24.  She was going to return to work on the 24th but had some moderate pain so was given another week off.  She is here for completion of forms to allow her to return to work.  She is only having mild pain now.  It bothers her most when turning over in bed.  She feels she can do her job, just wants reassurance that she is not can harm anything.  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     She also mentioned she has had about a month of some urinary frequency and urgency.  She has been drinking a little bit more liquids but when she gets the urge to go to the bathroom she is concerned she could have some leakage.  She drinks some caffeine but no significant change.  Noted dysuria, cloudiness or blood in the urine.  No abdominal pain.    Patient Active Problem List   Diagnosis     Advanced directives, counseling/discussion     CARDIOVASCULAR SCREENING; LDL GOAL LESS THAN 70     Postmenopausal status     Bilateral ankle pain     Dermatophytosis of nail     Diabetes mellitus type 2 in obese (H)     Hypothyroidism, unspecified type     Iron deficiency anemia, unspecified iron deficiency anemia type     Vitamin D deficiency     Morbid obesity (H)     Deltoid tendinitis of right shoulder     Abnormal Pap smear of cervix     Current Outpatient Medications   Medication Sig Dispense Refill     acetaminophen (TYLENOL) 500 MG tablet Take 2 tablets (1,000 mg) by mouth every 8 hours as needed for mild pain 100 tablet 0     blood glucose monitoring (ACCU-CHEK ARGENIS PLUS) meter device kit Use to test blood sugars 1 times daily or as directed. 1 kit 0     blood glucose monitoring (ACCU-CHEK  "ARGENIS) test strip Use to test blood sugars 1 times daily or as directed. 3 Box 1     blood glucose monitoring (ACCU-CHEK MULTICLIX) lancets Use to test blood sugar 1 times daily or as directed. 1 Box 0     ferrous sulfate (FE TABS) 325 (65 Fe) MG EC tablet Take 1 tablet (325 mg) by mouth daily 180 tablet 3     ibuprofen (ADVIL/MOTRIN) 600 MG tablet Take 1 tablet (600 mg) by mouth 2 times daily as needed for moderate pain 60 tablet 1     levothyroxine (SYNTHROID/LEVOTHROID) 112 MCG tablet Take 1 tablet (112 mcg) by mouth daily 90 tablet 3     naproxen (NAPROSYN) 500 MG tablet Take 1 tablet (500 mg) by mouth 2 times daily (with meals) 60 tablet 1     oxyCODONE (ROXICODONE) 5 MG tablet Take 1 tablet (5 mg) by mouth every 6 hours as needed for pain 12 tablet 0     vitamin D3 (CHOLECALCIFEROL) 50 mcg (2000 units) tablet Take 2 tablets (100 mcg) by mouth daily 200 tablet 3     albuterol (PROAIR HFA/PROVENTIL HFA/VENTOLIN HFA) 108 (90 Base) MCG/ACT inhaler Inhale 2 puffs into the lungs every 6 hours (Patient not taking: Reported on 11/10/2020) 1 Inhaler 1     cyclobenzaprine (FLEXERIL) 10 MG tablet Take 1 tablet (10 mg) by mouth 3 times daily as needed for muscle spasms (Patient not taking: Reported on 11/27/2020) 30 tablet 1     loratadine-pseudoePHEDrine (CLARITIN-D 12-HOUR) 5-120 MG 12 hr tablet Take 1 tablet by mouth 2 times daily (Patient not taking: Reported on 8/20/2020) 20 tablet 0      Social History     Tobacco Use     Smoking status: Never Smoker     Smokeless tobacco: Never Used   Substance Use Topics     Alcohol use: Yes     Alcohol/week: 0.0 standard drinks     Comment: Socially     Drug use: No      Review of Systems   No fever, chills      Objective    BP (!) 152/81 (BP Location: Right arm, Patient Position: Sitting, Cuff Size: Adult Regular)   Pulse 92   Temp 98.4  F (36.9  C) (Oral)   Resp 20   Ht 1.499 m (4' 11\")   Wt 95.9 kg (211 lb 6.4 oz)   LMP  (LMP Unknown)   SpO2 97%   BMI 42.70 kg/m  " "  Body mass index is 42.7 kg/m .  Physical Exam       Not examined.       UA is negative    Assessment & Plan     Closed fracture of multiple ribs of left side with routine healing, subsequent encounter  She has improved significantly, reassured that as long as she is not falling against her chest, lifting extremely heavy objects or pushing or pulling very heavy objects, she would not harm the fractures which should be healing well by now.  Given a work note to work half-time the first week and then full-time after that, also completed FMLA forms    Urinary frequency  Reassured her urine is negative, advised to try to cut out the caffeine, decrease fluids little bit.  If persists can see urology  - UA reflex to Microscopic and Culture     BMI:   Estimated body mass index is 42.7 kg/m  as calculated from the following:    Height as of this encounter: 1.499 m (4' 11\").    Weight as of this encounter: 95.9 kg (211 lb 6.4 oz).                No follow-ups on file.    Avani Shanks MD  [facility]    "

## 2020-11-27 NOTE — NURSING NOTE
"BP (!) 152/81 (BP Location: Right arm, Patient Position: Sitting, Cuff Size: Adult Regular)   Pulse 92   Temp 98.4  F (36.9  C) (Oral)   Resp 20   Ht 1.499 m (4' 11\")   Wt 95.9 kg (211 lb 6.4 oz)   LMP  (LMP Unknown)   SpO2 97%   BMI 42.70 kg/m      "

## 2020-11-27 NOTE — LETTER
[place] [address]  [facility] [address]  [phone]    11/27/2020     Jennifer Fallon   1955     To Whom it May Concern:     Ms. Jorge Fallon us under our care for fractured ribs. She may return to work on 11/30/2020 for 4 hours per day and return to full 8 hour shift on 12/7/2020.         Sincerely,               Avani Shanks MD

## 2021-01-31 DIAGNOSIS — E03.9 HYPOTHYROIDISM, UNSPECIFIED TYPE: ICD-10-CM

## 2021-01-31 DIAGNOSIS — E66.01 MORBID OBESITY (H): ICD-10-CM

## 2021-01-31 DIAGNOSIS — E66.9 DIABETES MELLITUS TYPE 2 IN OBESE: ICD-10-CM

## 2021-01-31 DIAGNOSIS — E11.69 DIABETES MELLITUS TYPE 2 IN OBESE: ICD-10-CM

## 2021-01-31 DIAGNOSIS — Z13.6 CARDIOVASCULAR SCREENING; LDL GOAL LESS THAN 70: ICD-10-CM

## 2021-01-31 DIAGNOSIS — D50.9 IRON DEFICIENCY ANEMIA, UNSPECIFIED IRON DEFICIENCY ANEMIA TYPE: ICD-10-CM

## 2021-01-31 DIAGNOSIS — E55.9 VITAMIN D DEFICIENCY: Primary | ICD-10-CM

## 2021-01-31 NOTE — LETTER
[facility]  [address], [address]  [place], [place]  [address]                                            TEL:[phone]  FAX:[phone]      Jennifer Fallon  [address]    [place] [address]      February 2, 2021    Dear Jennifer       We have received a refill request from your pharmacy for your medication - levothyroxine. Many medications require routine follow-up with your provider and a review of your chart indicates that you are due for fasting labs and a visit. We have sent a one time, 90 day refill to your pharmacy to allow you time to schedule an appointment.     Please call [phone] to schedule an appointment.  We look forward to seeing you in the near future.      Thank you,     [facility]

## 2021-02-01 NOTE — TELEPHONE ENCOUNTER
Pending Prescriptions:                       Disp   Refills    levothyroxine (SYNTHROID/LEVOTHROID) 112 M*90 tab*3        Sig: TAKE 1 TABLET(112 MCG) BY MOUTH DAILY    Routing refill request to provider for review/approval because:  Patient fails protocol    TSH   Date Value Ref Range Status   01/07/2020 3.91 0.40 - 4.00 mU/L Final

## 2021-02-02 RX ORDER — LEVOTHYROXINE SODIUM 112 UG/1
TABLET ORAL
Qty: 90 TABLET | Refills: 0 | Status: SHIPPED | OUTPATIENT
Start: 2021-02-02 | End: 2021-02-03

## 2021-02-03 DIAGNOSIS — E66.9 DIABETES MELLITUS TYPE 2 IN OBESE: ICD-10-CM

## 2021-02-03 DIAGNOSIS — E03.9 HYPOTHYROIDISM, UNSPECIFIED TYPE: ICD-10-CM

## 2021-02-03 DIAGNOSIS — E66.01 MORBID OBESITY (H): ICD-10-CM

## 2021-02-03 DIAGNOSIS — E66.9 DIABETES MELLITUS TYPE 2 IN OBESE: Primary | ICD-10-CM

## 2021-02-03 DIAGNOSIS — Z13.6 CARDIOVASCULAR SCREENING; LDL GOAL LESS THAN 70: ICD-10-CM

## 2021-02-03 DIAGNOSIS — E11.69 DIABETES MELLITUS TYPE 2 IN OBESE: ICD-10-CM

## 2021-02-03 DIAGNOSIS — E55.9 VITAMIN D DEFICIENCY: ICD-10-CM

## 2021-02-03 DIAGNOSIS — D50.9 IRON DEFICIENCY ANEMIA, UNSPECIFIED IRON DEFICIENCY ANEMIA TYPE: ICD-10-CM

## 2021-02-03 DIAGNOSIS — E78.00 HYPERCHOLESTEREMIA: ICD-10-CM

## 2021-02-03 DIAGNOSIS — E11.69 DIABETES MELLITUS TYPE 2 IN OBESE: Primary | ICD-10-CM

## 2021-02-03 LAB
ALBUMIN SERPL-MCNC: 3.2 G/DL (ref 3.4–5)
ALP SERPL-CCNC: 107 U/L (ref 40–150)
ALT SERPL W P-5'-P-CCNC: 42 U/L (ref 0–50)
ANION GAP SERPL CALCULATED.3IONS-SCNC: 5 MMOL/L (ref 3–14)
AST SERPL W P-5'-P-CCNC: 22 U/L (ref 0–45)
BASOPHILS # BLD AUTO: 0 10E9/L (ref 0–0.2)
BASOPHILS NFR BLD AUTO: 0.3 %
BILIRUB SERPL-MCNC: 0.4 MG/DL (ref 0.2–1.3)
BUN SERPL-MCNC: 11 MG/DL (ref 7–30)
CALCIUM SERPL-MCNC: 8.8 MG/DL (ref 8.5–10.1)
CHLORIDE SERPL-SCNC: 109 MMOL/L (ref 94–109)
CHOLEST SERPL-MCNC: 183 MG/DL
CO2 SERPL-SCNC: 25 MMOL/L (ref 20–32)
CREAT SERPL-MCNC: 0.46 MG/DL (ref 0.52–1.04)
CREAT UR-MCNC: 224 MG/DL
DEPRECATED CALCIDIOL+CALCIFEROL SERPL-MC: 19 UG/L (ref 20–75)
DIFFERENTIAL METHOD BLD: ABNORMAL
EOSINOPHIL # BLD AUTO: 0.1 10E9/L (ref 0–0.7)
EOSINOPHIL NFR BLD AUTO: 1.8 %
ERYTHROCYTE [DISTWIDTH] IN BLOOD BY AUTOMATED COUNT: 16.6 % (ref 10–15)
FERRITIN SERPL-MCNC: 15 NG/ML (ref 8–252)
GFR SERPL CREATININE-BSD FRML MDRD: >90 ML/MIN/{1.73_M2}
GLUCOSE SERPL-MCNC: 151 MG/DL (ref 70–99)
HBA1C MFR BLD: 7 % (ref 0–5.6)
HCT VFR BLD AUTO: 35.4 % (ref 35–47)
HDLC SERPL-MCNC: 36 MG/DL
HGB BLD-MCNC: 10.9 G/DL (ref 11.7–15.7)
IRON SATN MFR SERPL: 15 % (ref 15–46)
IRON SERPL-MCNC: 59 UG/DL (ref 35–180)
LDLC SERPL CALC-MCNC: 91 MG/DL
LYMPHOCYTES # BLD AUTO: 2 10E9/L (ref 0.8–5.3)
LYMPHOCYTES NFR BLD AUTO: 25.4 %
MCH RBC QN AUTO: 25.8 PG (ref 26.5–33)
MCHC RBC AUTO-ENTMCNC: 30.8 G/DL (ref 31.5–36.5)
MCV RBC AUTO: 84 FL (ref 78–100)
MICROALBUMIN UR-MCNC: 38 MG/L
MICROALBUMIN/CREAT UR: 17.19 MG/G CR (ref 0–25)
MONOCYTES # BLD AUTO: 0.5 10E9/L (ref 0–1.3)
MONOCYTES NFR BLD AUTO: 6.7 %
NEUTROPHILS # BLD AUTO: 5.2 10E9/L (ref 1.6–8.3)
NEUTROPHILS NFR BLD AUTO: 65.8 %
NONHDLC SERPL-MCNC: 147 MG/DL
PLATELET # BLD AUTO: 248 10E9/L (ref 150–450)
POTASSIUM SERPL-SCNC: 3.9 MMOL/L (ref 3.4–5.3)
PROT SERPL-MCNC: 7.5 G/DL (ref 6.8–8.8)
RBC # BLD AUTO: 4.22 10E12/L (ref 3.8–5.2)
SODIUM SERPL-SCNC: 139 MMOL/L (ref 133–144)
TIBC SERPL-MCNC: 406 UG/DL (ref 240–430)
TRIGL SERPL-MCNC: 279 MG/DL
TSH SERPL DL<=0.005 MIU/L-ACNC: 3.91 MU/L (ref 0.4–4)
WBC # BLD AUTO: 7.8 10E9/L (ref 4–11)

## 2021-02-03 PROCEDURE — 83036 HEMOGLOBIN GLYCOSYLATED A1C: CPT | Performed by: NURSE PRACTITIONER

## 2021-02-03 PROCEDURE — 36415 COLL VENOUS BLD VENIPUNCTURE: CPT | Performed by: NURSE PRACTITIONER

## 2021-02-03 PROCEDURE — 82043 UR ALBUMIN QUANTITATIVE: CPT | Performed by: NURSE PRACTITIONER

## 2021-02-03 PROCEDURE — 83540 ASSAY OF IRON: CPT | Performed by: NURSE PRACTITIONER

## 2021-02-03 PROCEDURE — 80061 LIPID PANEL: CPT | Performed by: NURSE PRACTITIONER

## 2021-02-03 PROCEDURE — 80050 GENERAL HEALTH PANEL: CPT | Performed by: NURSE PRACTITIONER

## 2021-02-03 PROCEDURE — 83550 IRON BINDING TEST: CPT | Performed by: NURSE PRACTITIONER

## 2021-02-03 PROCEDURE — 82306 VITAMIN D 25 HYDROXY: CPT | Performed by: NURSE PRACTITIONER

## 2021-02-03 PROCEDURE — 82728 ASSAY OF FERRITIN: CPT | Performed by: NURSE PRACTITIONER

## 2021-02-03 RX ORDER — ROSUVASTATIN CALCIUM 10 MG/1
10 TABLET, COATED ORAL DAILY
Qty: 90 TABLET | Refills: 3 | Status: SHIPPED | OUTPATIENT
Start: 2021-02-03 | End: 2023-08-16

## 2021-02-03 RX ORDER — LEVOTHYROXINE SODIUM 112 UG/1
TABLET ORAL
Qty: 90 TABLET | Refills: 3 | Status: SHIPPED | OUTPATIENT
Start: 2021-02-03 | End: 2021-12-21

## 2021-02-03 RX ORDER — METFORMIN HCL 500 MG
500 TABLET, EXTENDED RELEASE 24 HR ORAL
Qty: 90 TABLET | Refills: 1 | Status: SHIPPED | OUTPATIENT
Start: 2021-02-03 | End: 2021-06-01

## 2021-05-06 DIAGNOSIS — E03.9 HYPOTHYROIDISM, UNSPECIFIED TYPE: ICD-10-CM

## 2021-05-07 RX ORDER — LEVOTHYROXINE SODIUM 112 UG/1
TABLET ORAL
Qty: 90 TABLET | Refills: 3 | OUTPATIENT
Start: 2021-05-07

## 2021-05-24 ENCOUNTER — APPOINTMENT (OUTPATIENT)
Dept: INTERPRETER SERVICES | Facility: CLINIC | Age: 66
End: 2021-05-24
Payer: COMMERCIAL

## 2021-06-01 ENCOUNTER — OFFICE VISIT (OUTPATIENT)
Dept: INTERNAL MEDICINE | Facility: CLINIC | Age: 66
End: 2021-06-01
Payer: COMMERCIAL

## 2021-06-01 VITALS
RESPIRATION RATE: 20 BRPM | TEMPERATURE: 98.3 F | DIASTOLIC BLOOD PRESSURE: 71 MMHG | OXYGEN SATURATION: 98 % | HEART RATE: 64 BPM | SYSTOLIC BLOOD PRESSURE: 133 MMHG | WEIGHT: 207 LBS | BODY MASS INDEX: 41.73 KG/M2 | HEIGHT: 59 IN

## 2021-06-01 DIAGNOSIS — Z78.0 POSTMENOPAUSAL STATUS: ICD-10-CM

## 2021-06-01 DIAGNOSIS — E66.9 DIABETES MELLITUS TYPE 2 IN OBESE: ICD-10-CM

## 2021-06-01 DIAGNOSIS — E66.01 MORBID OBESITY (H): ICD-10-CM

## 2021-06-01 DIAGNOSIS — M25.562 LEFT KNEE PAIN, UNSPECIFIED CHRONICITY: ICD-10-CM

## 2021-06-01 DIAGNOSIS — D50.9 IRON DEFICIENCY ANEMIA, UNSPECIFIED IRON DEFICIENCY ANEMIA TYPE: ICD-10-CM

## 2021-06-01 DIAGNOSIS — E03.9 HYPOTHYROIDISM, UNSPECIFIED TYPE: ICD-10-CM

## 2021-06-01 DIAGNOSIS — E11.69 DIABETES MELLITUS TYPE 2 IN OBESE: ICD-10-CM

## 2021-06-01 DIAGNOSIS — Z12.31 ENCOUNTER FOR SCREENING MAMMOGRAM FOR BREAST CANCER: ICD-10-CM

## 2021-06-01 DIAGNOSIS — Z00.00 ROUTINE GENERAL MEDICAL EXAMINATION AT A HEALTH CARE FACILITY: Primary | ICD-10-CM

## 2021-06-01 DIAGNOSIS — E55.9 VITAMIN D DEFICIENCY: ICD-10-CM

## 2021-06-01 DIAGNOSIS — M25.522 LEFT ELBOW PAIN: ICD-10-CM

## 2021-06-01 LAB
ALBUMIN SERPL-MCNC: 3.4 G/DL (ref 3.4–5)
ALP SERPL-CCNC: 87 U/L (ref 40–150)
ALT SERPL W P-5'-P-CCNC: 29 U/L (ref 0–50)
ANION GAP SERPL CALCULATED.3IONS-SCNC: 6 MMOL/L (ref 3–14)
AST SERPL W P-5'-P-CCNC: 18 U/L (ref 0–45)
BASOPHILS # BLD AUTO: 0 10E9/L (ref 0–0.2)
BASOPHILS NFR BLD AUTO: 0.3 %
BILIRUB SERPL-MCNC: 0.3 MG/DL (ref 0.2–1.3)
BUN SERPL-MCNC: 22 MG/DL (ref 7–30)
CALCIUM SERPL-MCNC: 8.6 MG/DL (ref 8.5–10.1)
CHLORIDE SERPL-SCNC: 109 MMOL/L (ref 94–109)
CHOLEST SERPL-MCNC: 148 MG/DL
CO2 SERPL-SCNC: 25 MMOL/L (ref 20–32)
CREAT SERPL-MCNC: 0.47 MG/DL (ref 0.52–1.04)
DIFFERENTIAL METHOD BLD: ABNORMAL
EOSINOPHIL # BLD AUTO: 0.1 10E9/L (ref 0–0.7)
EOSINOPHIL NFR BLD AUTO: 1.9 %
ERYTHROCYTE [DISTWIDTH] IN BLOOD BY AUTOMATED COUNT: 17 % (ref 10–15)
GFR SERPL CREATININE-BSD FRML MDRD: >90 ML/MIN/{1.73_M2}
GLUCOSE SERPL-MCNC: 113 MG/DL (ref 70–99)
HBA1C MFR BLD: 6 % (ref 0–5.6)
HCT VFR BLD AUTO: 33.1 % (ref 35–47)
HDLC SERPL-MCNC: 48 MG/DL
HGB BLD-MCNC: 10 G/DL (ref 11.7–15.7)
LDLC SERPL CALC-MCNC: 66 MG/DL
LYMPHOCYTES # BLD AUTO: 1.7 10E9/L (ref 0.8–5.3)
LYMPHOCYTES NFR BLD AUTO: 23.5 %
MCH RBC QN AUTO: 23.6 PG (ref 26.5–33)
MCHC RBC AUTO-ENTMCNC: 30.2 G/DL (ref 31.5–36.5)
MCV RBC AUTO: 78 FL (ref 78–100)
MONOCYTES # BLD AUTO: 0.6 10E9/L (ref 0–1.3)
MONOCYTES NFR BLD AUTO: 7.6 %
NEUTROPHILS # BLD AUTO: 4.8 10E9/L (ref 1.6–8.3)
NEUTROPHILS NFR BLD AUTO: 66.7 %
NONHDLC SERPL-MCNC: 100 MG/DL
PLATELET # BLD AUTO: 238 10E9/L (ref 150–450)
POTASSIUM SERPL-SCNC: 3.8 MMOL/L (ref 3.4–5.3)
PROT SERPL-MCNC: 7.4 G/DL (ref 6.8–8.8)
RBC # BLD AUTO: 4.23 10E12/L (ref 3.8–5.2)
SODIUM SERPL-SCNC: 140 MMOL/L (ref 133–144)
TRIGL SERPL-MCNC: 169 MG/DL
TSH SERPL DL<=0.005 MIU/L-ACNC: 3.04 MU/L (ref 0.4–4)
WBC # BLD AUTO: 7.2 10E9/L (ref 4–11)

## 2021-06-01 PROCEDURE — 82306 VITAMIN D 25 HYDROXY: CPT | Performed by: NURSE PRACTITIONER

## 2021-06-01 PROCEDURE — 80050 GENERAL HEALTH PANEL: CPT | Performed by: NURSE PRACTITIONER

## 2021-06-01 PROCEDURE — 83550 IRON BINDING TEST: CPT | Performed by: NURSE PRACTITIONER

## 2021-06-01 PROCEDURE — 83540 ASSAY OF IRON: CPT | Performed by: NURSE PRACTITIONER

## 2021-06-01 PROCEDURE — 99396 PREV VISIT EST AGE 40-64: CPT | Performed by: NURSE PRACTITIONER

## 2021-06-01 PROCEDURE — 99207 PR FOOT EXAM NO CHARGE: CPT | Mod: 25 | Performed by: NURSE PRACTITIONER

## 2021-06-01 PROCEDURE — 80061 LIPID PANEL: CPT | Performed by: NURSE PRACTITIONER

## 2021-06-01 PROCEDURE — 83036 HEMOGLOBIN GLYCOSYLATED A1C: CPT | Performed by: NURSE PRACTITIONER

## 2021-06-01 PROCEDURE — 36415 COLL VENOUS BLD VENIPUNCTURE: CPT | Performed by: NURSE PRACTITIONER

## 2021-06-01 PROCEDURE — 82728 ASSAY OF FERRITIN: CPT | Performed by: NURSE PRACTITIONER

## 2021-06-01 RX ORDER — METFORMIN HCL 500 MG
500 TABLET, EXTENDED RELEASE 24 HR ORAL
Qty: 90 TABLET | Refills: 1 | Status: SHIPPED | OUTPATIENT
Start: 2021-06-01 | End: 2021-09-14

## 2021-06-01 ASSESSMENT — ENCOUNTER SYMPTOMS
CHILLS: 0
HEARTBURN: 0
SORE THROAT: 0
FEVER: 0
HEMATURIA: 0
WEAKNESS: 0
DIARRHEA: 0
NERVOUS/ANXIOUS: 0
PALPITATIONS: 0
NAUSEA: 0
SHORTNESS OF BREATH: 0
HEMATOCHEZIA: 0
DIZZINESS: 0
HEADACHES: 0
MYALGIAS: 0
BREAST MASS: 0
ARTHRALGIAS: 1
FREQUENCY: 0
COUGH: 0
PARESTHESIAS: 0
CONSTIPATION: 0
EYE PAIN: 0
ABDOMINAL PAIN: 0
JOINT SWELLING: 0
DYSURIA: 0

## 2021-06-01 ASSESSMENT — MIFFLIN-ST. JEOR: SCORE: 1394.58

## 2021-06-01 NOTE — PROGRESS NOTES
SUBJECTIVE:   CC: Jennifer Fallon is an 65 year old woman who presents for preventive health visit.     Fasting.  in room. Left elbow and left knee pain.    Patient has been advised of split billing requirements and indicates understanding: Yes     Healthy Habits:     Getting at least 3 servings of Calcium per day:  Yes    Bi-annual eye exam:  NO    Dental care twice a year:  Yes    Sleep apnea or symptoms of sleep apnea:  None    Diet:  Regular (no restrictions)    Frequency of exercise:  None    Taking medications regularly:  Yes    Medication side effects:  None    PHQ-2 Total Score: 0    Additional concerns today:  No      Today's PHQ-2 Score:   PHQ-2 ( 1999 Pfizer) 6/1/2021   Q1: Little interest or pleasure in doing things 0   Q2: Feeling down, depressed or hopeless 0   PHQ-2 Score 0   Q1: Little interest or pleasure in doing things Not at all   Q2: Feeling down, depressed or hopeless Not at all   PHQ-2 Score 0       Abuse: Current or Past (Physical, Sexual or Emotional) - No  Do you feel safe in your environment? Yes    Have you ever done Advance Care Planning? (For example, a Health Directive, POLST, or a discussion with a medical provider or your loved ones about your wishes): No, advance care planning information given to patient to review.  Patient declined advance care planning discussion at this time.    Social History     Tobacco Use     Smoking status: Never Smoker     Smokeless tobacco: Never Used   Substance Use Topics     Alcohol use: Yes     Alcohol/week: 0.0 standard drinks     Comment: Socially     If you drink alcohol do you typically have >3 drinks per day or >7 drinks per week? No    Alcohol Use 6/1/2021   Prescreen: >3 drinks/day or >7 drinks/week? No   Prescreen: >3 drinks/day or >7 drinks/week? -       Reviewed orders with patient.  Reviewed health maintenance and updated orders accordingly -       Breast Cancer Screening:  Any new diagnosis of family breast, ovarian, or bowel  "cancer?     FSH-7: No flowsheet data found.      Pertinent mammograms are reviewed under the imaging tab.    History of abnormal Pap smear:   PAP / HPV Latest Ref Rng & Units 10/31/2018 3/23/2016   PAP - NIL NIL   HPV 16 DNA NEG:Negative Negative -   HPV 18 DNA NEG:Negative Negative -   OTHER HR HPV NEG:Negative Negative -     Reviewed and updated as needed this visit by clinical staff  Tobacco  Allergies  Meds   Med Hx  Surg Hx  Fam Hx  Soc Hx        Reviewed and updated as needed this visit by Provider   Allergies                  Review of Systems   Constitutional: Negative for chills and fever.   HENT: Negative for congestion, ear pain, hearing loss and sore throat.    Eyes: Negative for pain and visual disturbance.   Respiratory: Negative for cough and shortness of breath.    Cardiovascular: Negative for chest pain, palpitations and peripheral edema.   Gastrointestinal: Negative for abdominal pain, constipation, diarrhea, heartburn, hematochezia and nausea.   Breasts:  Negative for tenderness, breast mass and discharge.   Genitourinary: Negative for dysuria, frequency, genital sores, hematuria, pelvic pain, urgency, vaginal bleeding and vaginal discharge.   Musculoskeletal: Positive for arthralgias. Negative for joint swelling and myalgias.   Skin: Negative for rash.   Neurological: Negative for dizziness, weakness, headaches and paresthesias.   Psychiatric/Behavioral: Negative for mood changes. The patient is not nervous/anxious.      Glucose have been low 100     Left knee medial pain   Left elbow are pain media side      Declined pap today      OBJECTIVE:   /71 (BP Location: Left arm, Patient Position: Chair, Cuff Size: Adult Large)   Pulse 64   Temp 98.3  F (36.8  C) (Oral)   Resp 20   Ht 1.499 m (4' 11\")   Wt 93.9 kg (207 lb)   LMP  (LMP Unknown)   SpO2 98%   Breastfeeding No   BMI 41.81 kg/m    Physical Exam  GENERAL: alert and no distress- seen with interprerter  EYES: Eyes grossly " normal to inspection, and conjunctivae and sclerae normal  HENT: ear canals and TM's normal, nose and mouth without ulcers or lesions  NECK: no adenopathy, no asymmetry, masses, or scars and thyroid normal to palpation  RESP: lungs clear to auscultation - no rales, rhonchi or wheezes  CV: regular rate and rhythm, normal S1 S2, no S3 or S4, no murmur, click or rub, no peripheral edema and peripheral pulses strong  ABDOMEN: soft, nontender, no hepatosplenomegaly, no masses and bowel sounds normal  MS: no gross musculoskeletal defects noted, no edema  MS: intermittent pain medial side left knee - no joint pain - able to fully extend elbow   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 Her left elbow also medial side pain - no joint pain   Feet - normal pulses and CMS   SKIN: no suspicious lesions or rashes  NEURO: Normal strength and tone, mentation intact and speech normal  PSYCH: mentation appears normal, affect normal/bright    Diagnostic Test Results:  Labs reviewed in [facility]  Lab     ASSESSMENT/PLAN:   1. Routine general medical examination at a health care facility      2. Diabetes mellitus type 2 in obese (H)  In good range   - metFORMIN (GLUCOPHAGE-XR) 500 MG 24 hr tablet; Take 1 tablet (500 mg) by mouth daily (with dinner)  Dispense: 90 tablet; Refill: 1  - Lipid panel reflex to direct LDL Fasting  - Comprehensive metabolic panel (BMP + Alb, Alk Phos, ALT, AST, Total. Bili, TP)  - Hemoglobin A1c  - blood glucose (ACCU-CHEK ARGENIS) test strip; Use to test blood sugars 1 times daily or as directed.  Dispense: 300 strip; Refill: 3    3. Morbid obesity (H)  Discussed   - Lipid panel reflex to direct LDL Fasting  - Comprehensive metabolic panel (BMP + Alb, Alk Phos, ALT, AST, Total. Bili, TP)    4. Iron deficiency anemia, unspecified iron deficiency anemia type    - Ferritin  - Iron and iron binding capacity  - CBC with platelets and differential    5. Vitamin D deficiency    - Vitamin D Deficiency    6.  "Hypothyroidism, unspecified type    - TSH with free T4 reflex    7. Postmenopausal status      8. Encounter for screening mammogram for breast cancer    - *MA Screening Digital Bilateral; Future    9. Left elbow pain  Discussed ice and NSAId as needed     10. Left knee pain, unspecified chronicity  Discussed ice and NSAID as needed   Ortho referral if more persistent       Patient has been advised of split billing requirements and indicates understanding:   COUNSELING:  Reviewed preventive health counseling, as reflected in patient instructions       Regular exercise       Healthy diet/nutrition       Osteoporosis prevention/bone health    Estimated body mass index is 41.81 kg/m  as calculated from the following:    Height as of this encounter: 1.499 m (4' 11\").    Weight as of this encounter: 93.9 kg (207 lb).        She reports that she has never smoked. She has never used smokeless tobacco.      Counseling Resources:  ATP IV Guidelines  Pooled Cohorts Equation Calculator  Breast Cancer Risk Calculator  BRCA-Related Cancer Risk Assessment: FHS-7 Tool  FRAX Risk Assessment  ICSI Preventive Guidelines  Dietary Guidelines for Americans, 2010  USDA's MyPlate  ASA Prophylaxis  Lung CA Screening    ARINA Dee [facility]  "

## 2021-06-01 NOTE — PATIENT INSTRUCTIONS
Lab in [address]    Ice to elbow for comfort     May take ibuprofen for pain     Also may ise knee of needed   If pain persists would have you see ortho

## 2021-06-02 ENCOUNTER — APPOINTMENT (OUTPATIENT)
Dept: INTERPRETER SERVICES | Facility: CLINIC | Age: 66
End: 2021-06-02
Payer: COMMERCIAL

## 2021-06-02 LAB
DEPRECATED CALCIDIOL+CALCIFEROL SERPL-MC: 19 UG/L (ref 20–75)
FERRITIN SERPL-MCNC: 6 NG/ML (ref 8–252)
IRON SATN MFR SERPL: 5 % (ref 15–46)
IRON SERPL-MCNC: 20 UG/DL (ref 35–180)
TIBC SERPL-MCNC: 436 UG/DL (ref 240–430)

## 2021-06-03 DIAGNOSIS — E55.9 VITAMIN D DEFICIENCY: Primary | ICD-10-CM

## 2021-06-03 DIAGNOSIS — D50.9 IRON DEFICIENCY ANEMIA, UNSPECIFIED IRON DEFICIENCY ANEMIA TYPE: ICD-10-CM

## 2021-06-03 RX ORDER — FERROUS SULFATE 325(65) MG
325 TABLET, DELAYED RELEASE (ENTERIC COATED) ORAL 2 TIMES DAILY
Qty: 180 TABLET | Refills: 3 | Status: SHIPPED | OUTPATIENT
Start: 2021-06-03 | End: 2023-08-16

## 2021-06-03 RX ORDER — CHOLECALCIFEROL (VITAMIN D3) 50 MCG
2 TABLET ORAL DAILY
Qty: 200 TABLET | Refills: 3 | Status: SHIPPED | OUTPATIENT
Start: 2021-06-03

## 2021-06-29 ENCOUNTER — HOSPITAL ENCOUNTER (OUTPATIENT)
Dept: MAMMOGRAPHY | Facility: CLINIC | Age: 66
Discharge: HOME | End: 2021-06-29
Attending: NURSE PRACTITIONER | Admitting: NURSE PRACTITIONER
Payer: COMMERCIAL

## 2021-06-29 DIAGNOSIS — Z12.31 ENCOUNTER FOR SCREENING MAMMOGRAM FOR BREAST CANCER: ICD-10-CM

## 2021-06-29 PROCEDURE — 77067 SCR MAMMO BI INCL CAD: CPT

## 2021-06-29 PROCEDURE — T1013 SIGN LANG/ORAL INTERPRETER: HCPCS | Mod: GT

## 2021-10-20 ENCOUNTER — APPOINTMENT (OUTPATIENT)
Dept: INTERPRETER SERVICES | Facility: CLINIC | Age: 66
End: 2021-10-20
Payer: COMMERCIAL

## 2021-12-21 ENCOUNTER — APPOINTMENT (OUTPATIENT)
Dept: INTERPRETER SERVICES | Facility: CLINIC | Age: 66
End: 2021-12-21
Payer: COMMERCIAL

## 2021-12-21 ENCOUNTER — TELEPHONE (OUTPATIENT)
Dept: INTERNAL MEDICINE | Facility: CLINIC | Age: 66
End: 2021-12-21
Payer: COMMERCIAL

## 2021-12-21 DIAGNOSIS — E03.9 HYPOTHYROIDISM, UNSPECIFIED TYPE: ICD-10-CM

## 2021-12-21 DIAGNOSIS — M25.569 KNEE PAIN, UNSPECIFIED CHRONICITY, UNSPECIFIED LATERALITY: Primary | ICD-10-CM

## 2021-12-21 RX ORDER — LEVOTHYROXINE SODIUM 112 UG/1
TABLET ORAL
Qty: 90 TABLET | Refills: 1 | Status: SHIPPED | OUTPATIENT
Start: 2021-12-21 | End: 2022-07-06

## 2021-12-21 NOTE — TELEPHONE ENCOUNTER
Patient did discuss left knee pain at physical on 6/1/21, Kesha recommended ortho referral if persistent. Routed to Kesha to review if patient should see Ortho instead for pain.    Birdie Ayala RN  [facility]

## 2021-12-21 NOTE — TELEPHONE ENCOUNTER
Called via Estonian interpretor ID# [id number] and advised patient referral placed to orthopedics for follow-up. Referral information provided to patient for [facility] and informed her they should contact her to schedule an appointment. Cancelled appointment with Kesha.     Patient asks if she is eligible for Moderna booster, advised patient she can have the booster at anytime as it has been more than 6 months since her last dose. Patient will call back to make appointment for booster.     Birdie Ayala RN  [facility]

## 2021-12-21 NOTE — TELEPHONE ENCOUNTER
Patient has appt 1/19/2022 but her knee is causing a lot of pain. Patient wants to be sooner. Can a virtual spot be used for a in clinic appt?  Ok to call and tru [phone]

## 2022-01-08 NOTE — PROGRESS NOTES
ASSESSMENT & PLAN  Patient Instructions     1. Acute pain of left knee    2. Knee pain, unspecified chronicity, unspecified laterality    3. Acute pain of right shoulder    4. Impingement syndrome of right shoulder    5. Primary osteoarthritis of left knee      -Patient has right shoulder pain due to tendinitis and bursitis and left knee pain due to arthritis  -Patient tolerated right subacromial cortisone injection today without complications. Patient was given postprocedure instruction  -Patient will start formal physical therapy and home exercise program in 1 to 2 weeks once her shoulder pain has improved  -Patient will start diclofenac 75 mg twice a day as needed for the right shoulder and left knee pain. Patient will also start Flexeril 10 mg at bedtime for nighttime pain. Hopefully, the nighttime pain will improve once the cortisone takes effect  -Patient may follow-up if left knee pain and swelling do not improve for possible drainage and cortisone injection.  -Patient will also follow-up if her right shoulder pain does not improve or if it returns  -Patient may continue to work as her pain allows  -Call direct clinic number [[phone]] at any time with questions or concerns.    Albert Yeo MD [facility]  [facility]          -----    SUBJECTIVE  Jennifer Fallon is a/an 66 year old female who is seen in consultation at the request of  Kesha Rodas C.N.P. for evaluation of left knee pain. The patient is seen by themselves. Use of  over the Ipad today.  Patient states her knee is making a lot of cracking sounds and notices pain with walking, uses knee brace which helped with working, complains of swelling in knee as well.   Patient also wants to discuss right shoulder pain and weakness today, states she cannot actively lift her arm, she has to use her other arm to passively lift it, constant pain. Feels like her shoulder is out of  "place     Left knee:  Onset: 1+ month(s) ago. Reports insidious onset without acute precipitating event.  Location of Pain: left knee, anterior knee, also posterior knee   Rating of Pain at worst: 10/10  Rating of Pain Currently: 5/10  Worsened by: walking for extended periods of time   Better with: knee brace  Treatments tried: knee brace, contrast baths  Associated symptoms: swelling, feeling of instability  Orthopedic history: YES - Date: long time ago fell on knee at work, but recovered from this  Relevant surgical history: NO    Right Shoulder  Onset: 1 month(s) ago. Reports insidious onset without acute precipitating event.  Location of Pain: right shoulder, lateral shoulder, constant pain   Rating of Pain at worst: 10/10  Rating of Pain Currently: 10/10  Worsened by: anything - reaching above her - pain is constant   Better with: rest  Treatments tried: Tylenol    Associated symptoms: weakness, loss of function, denies numbness or tingling  Orthopedic history: NO  Relevant surgical history: NO  Social history: social history: works as /cleaning      Past Medical History:   Diagnosis Date     Abnormal Pap smear of cervix 2012 approx date - pt reports in 10/31/18 visit notes that \"additional testing\" was performed     CARDIOVASCULAR SCREENING; LDL GOAL LESS THAN 130      Constipation      Diabetes (H) diagnosed 2016    controlled with diet only; no meds.     Diarrhea      Hypothyroidism      Social History     Socioeconomic History     Marital status:      Spouse name: Not on file     Number of children: Not on file     Years of education: Not on file     Highest education level: Not on file   Occupational History     Not on file   Tobacco Use     Smoking status: Never Smoker     Smokeless tobacco: Never Used   Substance and Sexual Activity     Alcohol use: Yes     Alcohol/week: 0.0 standard drinks     Comment: Socially     Drug use: No     Sexual activity: Yes     Partners: Male   Other Topics " "Concern     Parent/sibling w/ CABG, MI or angioplasty before 65F 55M? Not Asked   Social History Narrative     Not on file     Social Determinants of Health     Financial Resource Strain: Not on file   Food Insecurity: Not on file   Transportation Needs: Not on file   Physical Activity: Not on file   Stress: Not on file   Social Connections: Not on file   Intimate Partner Violence: Not on file   Housing Stability: Not on file         Patient's past medical, surgical, social, and family histories were reviewed today and no changes are noted.    REVIEW OF SYSTEMS:  10 point ROS is negative other than symptoms noted above in HPI, Past Medical History or as stated below  Constitutional: NEGATIVE for fever, chills, change in weight  Skin: NEGATIVE for worrisome rashes, moles or lesions  GI/: NEGATIVE for bowel or bladder changes  Neuro: NEGATIVE for weakness, dizziness or paresthesias    OBJECTIVE:  BP (!) 130/90   Ht 1.499 m (4' 11\")   Wt 98.2 kg (216 lb 6.4 oz)   LMP  (LMP Unknown)   BMI 43.71 kg/m     General: healthy, alert and in no distress  HEENT: no scleral icterus or conjunctival erythema  Skin: no suspicious lesions or rash. No jaundice.  CV: no pedal edema  Resp: normal respiratory effort without conversational dyspnea   Psych: normal mood and affect  Gait: normal steady gait with appropriate coordination and balance  Neuro: Normal light sensory exam of lower extremity  MSK:  LEFT KNEE  Inspection:    normal alignment  Palpation:    Tender about the lateral joint line and medial joint line. Remainder of bony and ligamentous landmarks are nontender.    Mild effusion is present    Patellofemoral crepitus is Present  Range of Motion:     00 extension to 1200 flexion  Strength:    Quadriceps grossly intact    Extensor mechanism intact  Special Tests:    Positive: none    Negative: MCL/valgus stress (0 & 30 deg), LCL/varus stress (0 & 30 deg), Lachman's, anterior drawer, posterior drawer, Daljit's    RIGHT " SHOULDER  Inspection:    no swelling, bruising, discoloration, or obvious deformity or asymmetry  Palpation:    Tender about the anterior capsule and greater tuberosity. Remainder of bony and tendinous landmarks are nontender.    Crepitus is Absent  Active Range of Motion:     Abduction 1350 / FF 1650 /  / IR L4.  Opposite arm abduction/flexion/ER within normal limits, IR L4    Scapular dyskinesis absent  Strength:    Scapular plane abduction painful / ER grossly intact / IR grossly intact / biceps grossly intact / triceps grossly intact  Special Tests:    Positive: Neer's, supraspinatus (empty can), crossed arm adduction and [place]'s    Negative: Baldwin', drop arm/painful arc, Speed's and Yergason's      Independent visualization of the below image:  Recent Results (from the past 24 hour(s))   XR Shoulder Right G/E 3 Views    Narrative    XR SHOULDER RIGHT G/E 3 VIEWS 1/11/2022 11:05 AM     HISTORY: Right shoulder pain    COMPARISON: None.      Impression    IMPRESSION: No fractures are identified. Normal glenohumeral  alignment. The acromioclavicular joint is unremarkable.     LEONIDAS GARCIA MD         SYSTEM ID:  [id number]   XR Knee Standing AP Bilat [place] Bilat Lat Left    Narrative    Mild medial compartment joint space narrowing.  Small osteophytes on the   undersurface of the patella.  No acute fracture or dislocation.       Large Joint Injection/Arthocentesis: R subacromial bursa    Date/Time: 1/11/2022 11:49 AM  Performed by: Yeo, Albert, MD  Authorized by: Yeo, Albert, MD     Indications:  Pain  Needle Size:  25 G  Guidance: landmark guided    Approach:  Posterior  Location:  Shoulder      Site:  R subacromial bursa  Medications:  40 mg methylPREDNISolone 40 MG/ML  Outcome:  Tolerated well, no immediate complications  Procedure discussed: discussed risks, benefits, and alternatives    Consent Given by:  Patient  Prep: patient was prepped and draped in usual sterile fashion            Albert Yeo  MD [facility]  [facility]

## 2022-01-11 ENCOUNTER — OFFICE VISIT (OUTPATIENT)
Dept: ORTHOPEDICS | Facility: CLINIC | Age: 67
End: 2022-01-11
Attending: NURSE PRACTITIONER
Payer: COMMERCIAL

## 2022-01-11 ENCOUNTER — ANCILLARY PROCEDURE (OUTPATIENT)
Dept: GENERAL RADIOLOGY | Facility: CLINIC | Age: 67
End: 2022-01-11
Attending: FAMILY MEDICINE
Payer: COMMERCIAL

## 2022-01-11 VITALS
DIASTOLIC BLOOD PRESSURE: 90 MMHG | BODY MASS INDEX: 43.63 KG/M2 | HEIGHT: 59 IN | SYSTOLIC BLOOD PRESSURE: 130 MMHG | WEIGHT: 216.4 LBS

## 2022-01-11 DIAGNOSIS — M25.511 RIGHT SHOULDER PAIN: ICD-10-CM

## 2022-01-11 DIAGNOSIS — M25.569 KNEE PAIN, UNSPECIFIED CHRONICITY, UNSPECIFIED LATERALITY: ICD-10-CM

## 2022-01-11 DIAGNOSIS — M25.562 LEFT KNEE PAIN: ICD-10-CM

## 2022-01-11 DIAGNOSIS — M25.562 ACUTE PAIN OF LEFT KNEE: Primary | ICD-10-CM

## 2022-01-11 DIAGNOSIS — M25.511 ACUTE PAIN OF RIGHT SHOULDER: ICD-10-CM

## 2022-01-11 DIAGNOSIS — M17.12 PRIMARY OSTEOARTHRITIS OF LEFT KNEE: ICD-10-CM

## 2022-01-11 DIAGNOSIS — M75.41 IMPINGEMENT SYNDROME OF RIGHT SHOULDER: ICD-10-CM

## 2022-01-11 PROCEDURE — 20610 DRAIN/INJ JOINT/BURSA W/O US: CPT | Mod: RT | Performed by: FAMILY MEDICINE

## 2022-01-11 PROCEDURE — 73030 X-RAY EXAM OF SHOULDER: CPT | Mod: RT | Performed by: RADIOLOGY

## 2022-01-11 PROCEDURE — 73562 X-RAY EXAM OF KNEE 3: CPT | Performed by: FAMILY MEDICINE

## 2022-01-11 PROCEDURE — 99204 OFFICE O/P NEW MOD 45 MIN: CPT | Mod: 25 | Performed by: FAMILY MEDICINE

## 2022-01-11 RX ORDER — ACETAMINOPHEN 325 MG/1
325-650 TABLET ORAL EVERY 6 HOURS PRN
COMMUNITY
End: 2023-08-16

## 2022-01-11 RX ORDER — CYCLOBENZAPRINE HCL 10 MG
10 TABLET ORAL
Qty: 30 TABLET | Refills: 0 | Status: SHIPPED | OUTPATIENT
Start: 2022-01-11 | End: 2022-02-07

## 2022-01-11 RX ORDER — METHYLPREDNISOLONE ACETATE 40 MG/ML
40 INJECTION, SUSPENSION INTRA-ARTICULAR; INTRALESIONAL; INTRAMUSCULAR; SOFT TISSUE
Status: SHIPPED | OUTPATIENT
Start: 2022-01-11

## 2022-01-11 RX ORDER — DICLOFENAC SODIUM 75 MG/1
75 TABLET, DELAYED RELEASE ORAL 2 TIMES DAILY PRN
Qty: 60 TABLET | Refills: 1 | Status: SHIPPED | OUTPATIENT
Start: 2022-01-11 | End: 2023-08-16

## 2022-01-11 RX ADMIN — METHYLPREDNISOLONE ACETATE 40 MG: 40 INJECTION, SUSPENSION INTRA-ARTICULAR; INTRALESIONAL; INTRAMUSCULAR; SOFT TISSUE at 11:49

## 2022-01-11 ASSESSMENT — MIFFLIN-ST. JEOR: SCORE: 1432.21

## 2022-01-11 NOTE — PATIENT INSTRUCTIONS
1. Acute pain of left knee    2. Knee pain, unspecified chronicity, unspecified laterality    3. Acute pain of right shoulder    4. Impingement syndrome of right shoulder    5. Primary osteoarthritis of left knee      -Patient has right shoulder pain due to tendinitis and bursitis and left knee pain due to arthritis  -Patient tolerated right subacromial cortisone injection today without complications. Patient was given postprocedure instruction  -Patient will start formal physical therapy and home exercise program in 1 to 2 weeks once her shoulder pain has improved  -Patient will start diclofenac 75 mg twice a day as needed for the right shoulder and left knee pain. Patient will also start Flexeril 10 mg at bedtime for nighttime pain. Hopefully, the nighttime pain will improve once the cortisone takes effect  -Patient may follow-up if left knee pain and swelling do not improve for possible drainage and cortisone injection.  -Patient will also follow-up if her right shoulder pain does not improve or if it returns  -Patient may continue to work as her pain allows  -Call direct clinic number [[phone]] at any time with questions or concerns.    Albert Yeo MD [facility]  [facility] [place]

## 2022-01-11 NOTE — LETTER
1/11/2022         RE: Jennifer Fallon  [address]  [address]  [place] [address]        Dear Colleague,    Thank you for referring your patient, Jennifer Fallon, to the [facility]IN[facility] [place]. Please see a copy of my visit note below.    ASSESSMENT & PLAN  Patient Instructions     1. Acute pain of left knee    2. Knee pain, unspecified chronicity, unspecified laterality    3. Acute pain of right shoulder    4. Impingement syndrome of right shoulder    5. Primary osteoarthritis of left knee      -Patient has right shoulder pain due to tendinitis and bursitis and left knee pain due to arthritis  -Patient tolerated right subacromial cortisone injection today without complications. Patient was given postprocedure instruction  -Patient will start formal physical therapy and home exercise program in 1 to 2 weeks once her shoulder pain has improved  -Patient will start diclofenac 75 mg twice a day as needed for the right shoulder and left knee pain. Patient will also start Flexeril 10 mg at bedtime for nighttime pain. Hopefully, the nighttime pain will improve once the cortisone takes effect  -Patient may follow-up if left knee pain and swelling do not improve for possible drainage and cortisone injection.  -Patient will also follow-up if her right shoulder pain does not improve or if it returns  -Patient may continue to work as her pain allows  -Call direct clinic number [[phone]] at any time with questions or concerns.    Albert Yeo MD [facility] Orthopedics and Sports Medicine  [facility]          -----    SUBJECTIVE  Jennifer Fallon is a/an 66 year old female who is seen in consultation at the request of  Kesha Rodas C.N.P. for evaluation of left knee pain. The patient is seen by themselves. Use of  over the Ipad today.  Patient states her knee is making a lot of cracking sounds and notices pain with walking, uses knee  "brace which helped with working, complains of swelling in knee as well.   Patient also wants to discuss right shoulder pain and weakness today, states she cannot actively lift her arm, she has to use her other arm to passively lift it, constant pain. Feels like her shoulder is out of place     Left knee:  Onset: 1+ month(s) ago. Reports insidious onset without acute precipitating event.  Location of Pain: left knee, anterior knee, also posterior knee   Rating of Pain at worst: 10/10  Rating of Pain Currently: 5/10  Worsened by: walking for extended periods of time   Better with: knee brace  Treatments tried: knee brace, contrast baths  Associated symptoms: swelling, feeling of instability  Orthopedic history: YES - Date: long time ago fell on knee at work, but recovered from this  Relevant surgical history: NO    Right Shoulder  Onset: 1 month(s) ago. Reports insidious onset without acute precipitating event.  Location of Pain: right shoulder, lateral shoulder, constant pain   Rating of Pain at worst: 10/10  Rating of Pain Currently: 10/10  Worsened by: anything - reaching above her - pain is constant   Better with: rest  Treatments tried: Tylenol    Associated symptoms: weakness, loss of function, denies numbness or tingling  Orthopedic history: NO  Relevant surgical history: NO  Social history: social history: works as /cleaning      Past Medical History:   Diagnosis Date     Abnormal Pap smear of cervix 2012 approx date - pt reports in 10/31/18 visit notes that \"additional testing\" was performed     CARDIOVASCULAR SCREENING; LDL GOAL LESS THAN 130      Constipation      Diabetes (H) diagnosed 2016    controlled with diet only; no meds.     Diarrhea      Hypothyroidism      Social History     Socioeconomic History     Marital status:      Spouse name: Not on file     Number of children: Not on file     Years of education: Not on file     Highest education level: Not on file   Occupational History " "    Not on file   Tobacco Use     Smoking status: Never Smoker     Smokeless tobacco: Never Used   Substance and Sexual Activity     Alcohol use: Yes     Alcohol/week: 0.0 standard drinks     Comment: Socially     Drug use: No     Sexual activity: Yes     Partners: Male   Other Topics Concern     Parent/sibling w/ CABG, MI or angioplasty before 65F 55M? Not Asked   Social History Narrative     Not on file     Social Determinants of Health     Financial Resource Strain: Not on file   Food Insecurity: Not on file   Transportation Needs: Not on file   Physical Activity: Not on file   Stress: Not on file   Social Connections: Not on file   Intimate Partner Violence: Not on file   Housing Stability: Not on file         Patient's past medical, surgical, social, and family histories were reviewed today and no changes are noted.    REVIEW OF SYSTEMS:  10 point ROS is negative other than symptoms noted above in HPI, Past Medical History or as stated below  Constitutional: NEGATIVE for fever, chills, change in weight  Skin: NEGATIVE for worrisome rashes, moles or lesions  GI/: NEGATIVE for bowel or bladder changes  Neuro: NEGATIVE for weakness, dizziness or paresthesias    OBJECTIVE:  BP (!) 130/90   Ht 1.499 m (4' 11\")   Wt 98.2 kg (216 lb 6.4 oz)   LMP  (LMP Unknown)   BMI 43.71 kg/m     General: healthy, alert and in no distress  HEENT: no scleral icterus or conjunctival erythema  Skin: no suspicious lesions or rash. No jaundice.  CV: no pedal edema  Resp: normal respiratory effort without conversational dyspnea   Psych: normal mood and affect  Gait: normal steady gait with appropriate coordination and balance  Neuro: Normal light sensory exam of lower extremity  MSK:  LEFT KNEE  Inspection:    normal alignment  Palpation:    Tender about the lateral joint line and medial joint line. Remainder of bony and ligamentous landmarks are nontender.    Mild effusion is present    Patellofemoral crepitus is Present  Range of " Motion:     00 extension to 1200 flexion  Strength:    Quadriceps grossly intact    Extensor mechanism intact  Special Tests:    Positive: none    Negative: MCL/valgus stress (0 & 30 deg), LCL/varus stress (0 & 30 deg), Lachman's, anterior drawer, posterior drawer, Daljit's    RIGHT SHOULDER  Inspection:    no swelling, bruising, discoloration, or obvious deformity or asymmetry  Palpation:    Tender about the anterior capsule and greater tuberosity. Remainder of bony and tendinous landmarks are nontender.    Crepitus is Absent  Active Range of Motion:     Abduction 1350 / FF 1650 /  / IR L4.  Opposite arm abduction/flexion/ER within normal limits, IR L4    Scapular dyskinesis absent  Strength:    Scapular plane abduction painful / ER grossly intact / IR grossly intact / biceps grossly intact / triceps grossly intact  Special Tests:    Positive: Neer's, supraspinatus (empty can), crossed arm adduction and Sibley's    Negative: Baldwin', drop arm/painful arc, Speed's and Yergason's      Independent visualization of the below image:  Recent Results (from the past 24 hour(s))   XR Shoulder Right G/E 3 Views    Narrative    XR SHOULDER RIGHT G/E 3 VIEWS 1/11/2022 11:05 AM     HISTORY: Right shoulder pain    COMPARISON: None.      Impression    IMPRESSION: No fractures are identified. Normal glenohumeral  alignment. The acromioclavicular joint is unremarkable.     LEONIDAS GARCIA MD         SYSTEM ID:  [id number]   XR Knee Standing AP Bilat Ravia Bilat Lat Left    Narrative    Mild medial compartment joint space narrowing.  Small osteophytes on the   undersurface of the patella.  No acute fracture or dislocation.       Large Joint Injection/Arthocentesis: R subacromial bursa    Date/Time: 1/11/2022 11:49 AM  Performed by: Yeo, Albert, MD  Authorized by: Yeo, Albert, MD     Indications:  Pain  Needle Size:  25 G  Guidance: landmark guided    Approach:  Posterior  Location:  Shoulder      Site:  R subacromial  bursa  Medications:  40 mg methylPREDNISolone 40 MG/ML  Outcome:  Tolerated well, no immediate complications  Procedure discussed: discussed risks, benefits, and alternatives    Consent Given by:  Patient  Prep: patient was prepped and draped in usual sterile fashion            Albert Yeo MD [facility]        Again, thank you for allowing me to participate in the care of your patient.        Sincerely,        Albert Yeo, MD

## 2022-02-03 NOTE — PROGRESS NOTES
ASSESSMENT & PLAN  Patient Instructions     1. Impingement syndrome of right shoulder    2. Primary osteoarthritis of left knee      -Patient is following up for right shoulder pain due to impingement and left knee pain due to arthritis  -Patient reports significant improvement in right shoulder pain after her subacromial cortisone injection at the previous visit.  Patient still has pain with extension of the arm but no longer has nighttime pain or pain with overhead movements  -Patient states that left knee pain is tolerable and has improved slightly with the usage of a knee brace while at work.  Patient states that she is not interested in a cortisone injection for the left knee at this time  -We discussed formal in person physical therapy for strengthening exercises and versus home exercise program.  Patient was given handouts and explained home exercises for both the left knee and right shoulder.  Patient will call us if pain does not improve with home exercises and feels that she needs formal in person therapy  -Patient will follow up if pain returns  -Call direct clinic number [[phone]] at any time with questions or concerns.    Albert Yeo MD [facility] Orthopedics and Sports Medicine  [facility]          -----    SUBJECTIVE:  Jennifer Fallon is a 66 year old female who is seen in follow-up for right shoulder and left knee pain.They were last seen 1/11/2022.     Regarding the right shoulder, since their last visit reports 50% improvement. They indicate that their current pain level is 0/10. Patient states she almost no longer has any pain. They have tried corticosteroid injection (most recent date: 1/11/2022) that provided a significant amount of relief and Diclofenac and Flexeril, she only takes when she has significant pain     Regarding the left knee, since their last visit reports 20% improvement. They indicate that their current pain level is 3/10. They have tried knee brace  "(whch helps a little).     The patient is seen by themselves.    Patient's past medical, surgical, social, and family histories were reviewed today and no changes are noted.    REVIEW OF SYSTEMS:  Constitutional: NEGATIVE for fever, chills, change in weight  Skin: NEGATIVE for worrisome rashes, moles or lesions  GI/: NEGATIVE for bowel or bladder changes  Neuro: NEGATIVE for weakness, dizziness or paresthesias    OBJECTIVE:  /88   Ht 1.499 m (4' 11\")   Wt 98 kg (216 lb)   LMP  (LMP Unknown)   BMI 43.63 kg/m     General: healthy, alert and in no distress  HEENT: no scleral icterus or conjunctival erythema  Skin: no suspicious lesions or rash. No jaundice.  CV: regular rhythm by palpation, no pedal edema  Resp: normal respiratory effort without conversational dyspnea   Psych: normal mood and affect  Gait: normal steady gait with appropriate coordination and balance  Neuro: normal light touch sensory exam of the extremities.    MSK:  LEFT KNEE  Inspection:    normal alignment  Palpation:    Tender about the lateral joint line and medial joint line. Remainder of bony and ligamentous landmarks are nontender.    Mild effusion is present    Patellofemoral crepitus is Present  Range of Motion:     00 extension to 1200 flexion  Strength:    Quadriceps grossly intact    Extensor mechanism intact  Special Tests:    Positive: none    Negative: MCL/valgus stress (0 & 30 deg), LCL/varus stress (0 & 30 deg), Lachman's, anterior drawer, posterior drawer, Daljit's     RIGHT SHOULDER  Inspection:    no swelling, bruising, discoloration, or obvious deformity or asymmetry  Palpation:  bony and tendinous landmarks are nontender.    Crepitus is Absent  Active Range of Motion:     Abduction 1650 / FF 1800 /  / IR L4.  Opposite arm abduction/flexion/ER within normal limits, IR L4    Scapular dyskinesis absent  Strength:    Scapular plane grossly intact / ER grossly intact / IR grossly intact / biceps grossly intact / " triceps grossly intact  Special Tests:    Positive: none    Negative: Neer's, supraspinatus (empty can), crossed arm adduction and Kern's, Baldwin', drop arm/painful arc, Speed's and Yergason's       Independent visualization of the below image:    Patient's conditions were thoroughly discussed during today's visit with total time spent face-to-face with the patient and documentation being 18 minutes.    Albert Yeo MD, [facility] Orthopedic Care

## 2022-02-05 DIAGNOSIS — M17.12 PRIMARY OSTEOARTHRITIS OF LEFT KNEE: ICD-10-CM

## 2022-02-05 DIAGNOSIS — M75.41 IMPINGEMENT SYNDROME OF RIGHT SHOULDER: ICD-10-CM

## 2022-02-07 RX ORDER — CYCLOBENZAPRINE HCL 10 MG
TABLET ORAL
Qty: 30 TABLET | Refills: 0 | Status: SHIPPED | OUTPATIENT
Start: 2022-02-07 | End: 2023-08-16

## 2022-02-07 NOTE — TELEPHONE ENCOUNTER
Medication Request for: cyclobenzaprine (FLEXERIL) 10 MG tablet            Prescription last written on 1/11/22 by Dr. Yeo  Sig: Take 1 tablet (10 mg) by mouth nightly as needed for muscle spasms   Last Fill Quantity: 30 with # refills: 0     Last Office Visit by provider: 1/11/22  Procedure Performed (if applicable): Right subacromial bursa CSI  Future Office Visit:   2/8/22    Patient desires to have faxed or E-prescribe to pharmacy if available  Pharmacy selected in "[employer].    Medication requests may take up to 3 business days for a response  Has patient been notified of this No    Michell Guevara MBA, ATC

## 2022-02-08 ENCOUNTER — OFFICE VISIT (OUTPATIENT)
Dept: ORTHOPEDICS | Facility: CLINIC | Age: 67
End: 2022-02-08
Payer: COMMERCIAL

## 2022-02-08 VITALS
WEIGHT: 216 LBS | BODY MASS INDEX: 43.55 KG/M2 | DIASTOLIC BLOOD PRESSURE: 88 MMHG | SYSTOLIC BLOOD PRESSURE: 128 MMHG | HEIGHT: 59 IN

## 2022-02-08 DIAGNOSIS — M17.12 PRIMARY OSTEOARTHRITIS OF LEFT KNEE: ICD-10-CM

## 2022-02-08 DIAGNOSIS — M75.41 IMPINGEMENT SYNDROME OF RIGHT SHOULDER: Primary | ICD-10-CM

## 2022-02-08 PROCEDURE — 99212 OFFICE O/P EST SF 10 MIN: CPT | Performed by: FAMILY MEDICINE

## 2022-02-08 PROCEDURE — T1013 SIGN LANG/ORAL INTERPRETER: HCPCS | Mod: U4 | Performed by: FAMILY MEDICINE

## 2022-02-08 ASSESSMENT — MIFFLIN-ST. JEOR: SCORE: 1430.4

## 2022-02-08 NOTE — PATIENT INSTRUCTIONS
1. Impingement syndrome of right shoulder    2. Primary osteoarthritis of left knee      -Patient is following up for right shoulder pain due to impingement and left knee pain due to arthritis  -Patient reports significant improvement in right shoulder pain after her subacromial cortisone injection at the previous visit.  Patient still has pain with extension of the arm but no longer has nighttime pain or pain with overhead movements  -Patient states that left knee pain is tolerable and has improved slightly with the usage of a knee brace while at work.  Patient states that she is not interested in a cortisone injection for the left knee at this time  -We discussed formal in person physical therapy for strengthening exercises and versus home exercise program.  Patient was given handouts and explained home exercises for both the left knee and right shoulder.  Patient will call us if pain does not improve with home exercises and feels that she needs formal in person therapy  -Patient will follow up if pain returns  -Call direct clinic number [[phone]] at any time with questions or concerns.    Albert Yeo MD [facility]  [facility] [place]

## 2022-02-08 NOTE — LETTER
2/8/2022         RE: Jennifer Fallon  [address]  [address]  [place] [address]        Dear Colleague,    Thank you for referring your patient, Jennifer Fallon, to the [facility]IC[facility] [place]. Please see a copy of my visit note below.    ASSESSMENT & PLAN  Patient Instructions     1. Impingement syndrome of right shoulder    2. Primary osteoarthritis of left knee      -Patient is following up for right shoulder pain due to impingement and left knee pain due to arthritis  -Patient reports significant improvement in right shoulder pain after her subacromial cortisone injection at the previous visit.  Patient still has pain with extension of the arm but no longer has nighttime pain or pain with overhead movements  -Patient states that left knee pain is tolerable and has improved slightly with the usage of a knee brace while at work.  Patient states that she is not interested in a cortisone injection for the left knee at this time  -We discussed formal in person physical therapy for strengthening exercises and versus home exercise program.  Patient was given handouts and explained home exercises for both the left knee and right shoulder.  Patient will call us if pain does not improve with home exercises and feels that she needs formal in person therapy  -Patient will follow up if pain returns  -Call direct clinic number [[phone]] at any time with questions or concerns.    Albert Yeo MD [facility] Orthopedics and Sports Medicine  [facility]          -----    SUBJECTIVE:  Jennifer Fallon is a 66 year old female who is seen in follow-up for right shoulder and left knee pain.They were last seen 1/11/2022.     Regarding the right shoulder, since their last visit reports 50% improvement. They indicate that their current pain level is 0/10. Patient states she almost no longer has any pain. They have tried corticosteroid injection (most recent  "date: 1/11/2022) that provided a significant amount of relief and Diclofenac and Flexeril, she only takes when she has significant pain     Regarding the left knee, since their last visit reports 20% improvement. They indicate that their current pain level is 3/10. They have tried knee brace (whch helps a little).     The patient is seen by themselves.    Patient's past medical, surgical, social, and family histories were reviewed today and no changes are noted.    REVIEW OF SYSTEMS:  Constitutional: NEGATIVE for fever, chills, change in weight  Skin: NEGATIVE for worrisome rashes, moles or lesions  GI/: NEGATIVE for bowel or bladder changes  Neuro: NEGATIVE for weakness, dizziness or paresthesias    OBJECTIVE:  /88   Ht 1.499 m (4' 11\")   Wt 98 kg (216 lb)   LMP  (LMP Unknown)   BMI 43.63 kg/m     General: healthy, alert and in no distress  HEENT: no scleral icterus or conjunctival erythema  Skin: no suspicious lesions or rash. No jaundice.  CV: regular rhythm by palpation, no pedal edema  Resp: normal respiratory effort without conversational dyspnea   Psych: normal mood and affect  Gait: normal steady gait with appropriate coordination and balance  Neuro: normal light touch sensory exam of the extremities.    MSK:  LEFT KNEE  Inspection:    normal alignment  Palpation:    Tender about the lateral joint line and medial joint line. Remainder of bony and ligamentous landmarks are nontender.    Mild effusion is present    Patellofemoral crepitus is Present  Range of Motion:     00 extension to 1200 flexion  Strength:    Quadriceps grossly intact    Extensor mechanism intact  Special Tests:    Positive: none    Negative: MCL/valgus stress (0 & 30 deg), LCL/varus stress (0 & 30 deg), Lachman's, anterior drawer, posterior drawer, Daljit's     RIGHT SHOULDER  Inspection:    no swelling, bruising, discoloration, or obvious deformity or asymmetry  Palpation:  bony and tendinous landmarks are nontender.    " Crepitus is Absent  Active Range of Motion:     Abduction 1650 / FF 1800 /  / IR L4.  Opposite arm abduction/flexion/ER within normal limits, IR L4    Scapular dyskinesis absent  Strength:    Scapular plane grossly intact / ER grossly intact / IR grossly intact / biceps grossly intact / triceps grossly intact  Special Tests:    Positive: none    Negative: Neer's, supraspinatus (empty can), crossed arm adduction and Harney's, Baldwin', drop arm/painful arc, Speed's and Yergason's       Independent visualization of the below image:    Patient's conditions were thoroughly discussed during today's visit with total time spent face-to-face with the patient and documentation being 18 minutes.    Albert Yeo MD, [facility] and Orthopedic Care            Again, thank you for allowing me to participate in the care of your patient.        Sincerely,        Albert Yeo, MD

## 2022-03-07 DIAGNOSIS — M75.41 IMPINGEMENT SYNDROME OF RIGHT SHOULDER: ICD-10-CM

## 2022-03-07 DIAGNOSIS — M17.12 PRIMARY OSTEOARTHRITIS OF LEFT KNEE: ICD-10-CM

## 2022-03-07 NOTE — TELEPHONE ENCOUNTER
Medication Request for:     cyclobenzaprine (FLEXERIL) 10 MG tablet           Prescription last written on 2/7/22 by Dr. Yeo  Sig: TAKE 1 TABLET(10 MG) BY MOUTH EVERY NIGHT AS NEEDED FOR MUSCLE SPASMS   Last Fill Quantity: 30 with # refills: 0    diclofenac (VOLTAREN) 75 MG EC tablet   Prescription last written on 1/11/22 by Dr. Yeo  Sig: Take 1 tablet (75 mg) by mouth 2 times daily as needed for moderate pain   Last Fill Quantity: 60 with # refills: 1 (unsure if refill has been used)       Last Office Visit by provider: 2/8/22  Procedure Performed (if applicable): 1/11/22 - right subacromial bursa CSI  Future Office Visit:   None scheduled    Patient desires to have faxed or E-prescribe to pharmacy if available  Pharmacy selected in [employer].    Phone number patient can be reached at: Cell number on file:    Telephone Information:   Mobile [phone]     Can we leave a detailed message on this number? YES    Phone call to patient using . Spoke to patient who states she no longer needs these medications since the cortisone injection has helped with pain. Writer verbalized understanding and patient was appreciative of call.    Michell Guevara MBA, ATC

## 2022-03-08 RX ORDER — CYCLOBENZAPRINE HCL 10 MG
TABLET ORAL
Qty: 30 TABLET | Refills: 0 | OUTPATIENT
Start: 2022-03-08

## 2022-03-08 RX ORDER — DICLOFENAC SODIUM 75 MG/1
TABLET, DELAYED RELEASE ORAL
Qty: 60 TABLET | Refills: 1 | OUTPATIENT
Start: 2022-03-08

## 2022-07-05 DIAGNOSIS — E03.9 HYPOTHYROIDISM, UNSPECIFIED TYPE: ICD-10-CM

## 2022-07-06 RX ORDER — LEVOTHYROXINE SODIUM 112 UG/1
TABLET ORAL
Qty: 90 TABLET | Refills: 1 | Status: SHIPPED | OUTPATIENT
Start: 2022-07-06 | End: 2023-03-16

## 2022-07-06 NOTE — TELEPHONE ENCOUNTER
Pending Prescriptions:                       Disp   Refills    levothyroxine (SYNTHROID/LEVOTHROID) 112 M*90 tab*1        Sig: TAKE 1 TABLET(112 MCG) BY MOUTH DAILY  Routing refill request to provider for review/approval because:  Fails protocol

## 2022-07-21 ENCOUNTER — TRANSFERRED RECORDS (OUTPATIENT)
Dept: HEALTH INFORMATION MANAGEMENT | Facility: CLINIC | Age: 67
End: 2022-07-21

## 2022-10-17 DIAGNOSIS — E11.69 DIABETES MELLITUS TYPE 2 IN OBESE: ICD-10-CM

## 2022-10-17 DIAGNOSIS — E66.9 DIABETES MELLITUS TYPE 2 IN OBESE: ICD-10-CM

## 2022-10-19 RX ORDER — METFORMIN HCL 500 MG
TABLET, EXTENDED RELEASE 24 HR ORAL
Qty: 90 TABLET | Refills: 1 | Status: SHIPPED | OUTPATIENT
Start: 2022-10-19 | End: 2023-08-16

## 2022-10-19 NOTE — TELEPHONE ENCOUNTER
Pending Prescriptions:                       Disp   Refills    metFORMIN (GLUCOPHAGE XR) 500 MG 24 hr tab*90 tab*1        Sig: TAKE 1 TABLET(500 MG) BY MOUTH DAILY WITH DINNER    Routing refill request to provider for review/approval because:  Needs provider review

## 2023-03-13 DIAGNOSIS — E03.9 HYPOTHYROIDISM, UNSPECIFIED TYPE: ICD-10-CM

## 2023-03-15 NOTE — TELEPHONE ENCOUNTER
Levothyroxine 112 mcg tab  Routing refill request to provider for review/approval because:  Labs out of range:    TSH   Date Value Ref Range Status   06/01/2021 3.04 0.40 - 4.00 mU/L Final     Patient needs to be seen because it has been more than 1 year since last office visit.    LOV 06/01/2021    Appointments in Next Year      Apr 05, 2023 10:30 AM  (Arrive by 10:10 AM)  Adult Preventative Visit with ARINA Dee CNP  [facility] ([facility] ) [phone]

## 2023-03-16 RX ORDER — LEVOTHYROXINE SODIUM 112 UG/1
TABLET ORAL
Qty: 90 TABLET | Refills: 0 | Status: SHIPPED | OUTPATIENT
Start: 2023-03-16 | End: 2023-07-11

## 2023-07-08 DIAGNOSIS — E03.9 HYPOTHYROIDISM, UNSPECIFIED TYPE: ICD-10-CM

## 2023-07-11 ENCOUNTER — APPOINTMENT (OUTPATIENT)
Dept: INTERPRETER SERVICES | Facility: CLINIC | Age: 68
End: 2023-07-11
Payer: COMMERCIAL

## 2023-07-11 DIAGNOSIS — E66.9 DIABETES MELLITUS TYPE 2 IN OBESE: ICD-10-CM

## 2023-07-11 DIAGNOSIS — E03.9 HYPOTHYROIDISM, UNSPECIFIED TYPE: ICD-10-CM

## 2023-07-11 DIAGNOSIS — E11.69 DIABETES MELLITUS TYPE 2 IN OBESE: ICD-10-CM

## 2023-07-11 RX ORDER — BLOOD SUGAR DIAGNOSTIC
STRIP MISCELLANEOUS
Qty: 300 STRIP | Refills: 0 | Status: SHIPPED | OUTPATIENT
Start: 2023-07-11 | End: 2023-09-08

## 2023-07-11 RX ORDER — LEVOTHYROXINE SODIUM 112 UG/1
112 TABLET ORAL DAILY
Qty: 90 TABLET | Refills: 0 | Status: SHIPPED | OUTPATIENT
Start: 2023-07-11 | End: 2023-08-16

## 2023-07-11 NOTE — TELEPHONE ENCOUNTER
Medication Question or Refill    Contacts       Type Contact Phone/Fax    07/11/2023 02:41 PM CDT Phone (Incoming) Jennifer Gonzalez (Self) [phone] (M)          What medication are you calling about (include dose and sig)?: levothyroxine (SYNTHROID/LEVOTHROID) 112 MCG tablet , blood glucose (ACCU-CHEK ARGENIS) test strip       Preferred Pharmacy:   [id number] [address]  [facility] [address]  Phone: [phone] Fax: [phone]      Controlled Substance Agreement on file:   CSA -- Patient Level:    CSA: None found at the patient level.       Who prescribed the medication?: Kesha Rodas    Do you need a refill? Yes    When did you use the medication last? 07/10/2023    Patient offered an appointment? Yes: But won't take it, its way out too far    Do you have any questions or concerns?  No      Okay to leave a detailed message?: Yes at Cell number on file:    Telephone Information:   Mobile [phone]

## 2023-07-11 NOTE — TELEPHONE ENCOUNTER
Medication is being filled for 1 time refill only due to:  Patient needs labs TSH. Patient needs to be seen because it has been more than one year since last visit.     Upcoming appointment info:  Future Appointments 7/11/2023 - 1/7/2024      Date Visit Type Length Department Provider     8/16/2023  7:30 AM OFFICE VISIT 30 min Keenan Collins NP    Location Instructions:     [facility] is in the [facility] at [address] next to [facility]. This is near the [facility] [address] split and the [facility] [address]  exits off of [address]. To reach the clinic from [place] [address], turn north onto [address], then turn east on [address]. Clinic parking is available next to the [facility], which is just east of the hospital s main entrance.                  Thank you,  Johnathan Villeda, Triage RN [facility]  3:46 PM 7/11/2023

## 2023-07-15 RX ORDER — LEVOTHYROXINE SODIUM 112 UG/1
TABLET ORAL
Qty: 90 TABLET | Refills: 0 | OUTPATIENT
Start: 2023-07-15

## 2023-08-16 ENCOUNTER — TELEPHONE (OUTPATIENT)
Dept: INTERNAL MEDICINE | Facility: CLINIC | Age: 68
End: 2023-08-16

## 2023-08-16 ENCOUNTER — OFFICE VISIT (OUTPATIENT)
Dept: INTERNAL MEDICINE | Facility: CLINIC | Age: 68
End: 2023-08-16
Payer: COMMERCIAL

## 2023-08-16 VITALS
RESPIRATION RATE: 14 BRPM | HEART RATE: 87 BPM | SYSTOLIC BLOOD PRESSURE: 142 MMHG | TEMPERATURE: 97.5 F | OXYGEN SATURATION: 97 % | DIASTOLIC BLOOD PRESSURE: 80 MMHG | HEIGHT: 59 IN | BODY MASS INDEX: 41.12 KG/M2 | WEIGHT: 204 LBS

## 2023-08-16 DIAGNOSIS — R07.9 CHEST PAIN, UNSPECIFIED TYPE: Primary | ICD-10-CM

## 2023-08-16 DIAGNOSIS — E78.00 HYPERCHOLESTEREMIA: ICD-10-CM

## 2023-08-16 DIAGNOSIS — D50.9 IRON DEFICIENCY ANEMIA, UNSPECIFIED IRON DEFICIENCY ANEMIA TYPE: ICD-10-CM

## 2023-08-16 DIAGNOSIS — D50.9 IRON DEFICIENCY ANEMIA, UNSPECIFIED IRON DEFICIENCY ANEMIA TYPE: Primary | ICD-10-CM

## 2023-08-16 DIAGNOSIS — E03.9 HYPOTHYROIDISM, UNSPECIFIED TYPE: ICD-10-CM

## 2023-08-16 DIAGNOSIS — D64.9 SYMPTOMATIC ANEMIA: ICD-10-CM

## 2023-08-16 DIAGNOSIS — E66.01 MORBID OBESITY (H): ICD-10-CM

## 2023-08-16 DIAGNOSIS — E55.9 VITAMIN D DEFICIENCY: ICD-10-CM

## 2023-08-16 DIAGNOSIS — Z12.31 VISIT FOR SCREENING MAMMOGRAM: ICD-10-CM

## 2023-08-16 LAB
ALBUMIN SERPL BCG-MCNC: 4.4 G/DL (ref 3.5–5.2)
ALP SERPL-CCNC: 102 U/L (ref 35–104)
ALT SERPL W P-5'-P-CCNC: 19 U/L (ref 0–50)
ANION GAP SERPL CALCULATED.3IONS-SCNC: 10 MMOL/L (ref 7–15)
AST SERPL W P-5'-P-CCNC: 29 U/L (ref 0–45)
BASOPHILS # BLD AUTO: 0 10E3/UL (ref 0–0.2)
BASOPHILS NFR BLD AUTO: 0 %
BILIRUB SERPL-MCNC: 0.3 MG/DL
BUN SERPL-MCNC: 16.7 MG/DL (ref 8–23)
CALCIUM SERPL-MCNC: 9 MG/DL (ref 8.8–10.2)
CHLORIDE SERPL-SCNC: 108 MMOL/L (ref 98–107)
CHOLEST SERPL-MCNC: 163 MG/DL
CREAT SERPL-MCNC: 0.5 MG/DL (ref 0.51–0.95)
CREAT UR-MCNC: 97.1 MG/DL
DEPRECATED HCO3 PLAS-SCNC: 24 MMOL/L (ref 22–29)
ELLIPTOCYTES BLD QL SMEAR: SLIGHT
EOSINOPHIL # BLD AUTO: 0.2 10E3/UL (ref 0–0.7)
EOSINOPHIL NFR BLD AUTO: 3 %
ERYTHROCYTE [DISTWIDTH] IN BLOOD BY AUTOMATED COUNT: 20.8 % (ref 10–15)
FERRITIN SERPL-MCNC: 6 NG/ML (ref 11–328)
GFR SERPL CREATININE-BSD FRML MDRD: >90 ML/MIN/1.73M2
GLUCOSE SERPL-MCNC: 116 MG/DL (ref 70–99)
HBA1C MFR BLD: 6.1 % (ref 0–5.6)
HCT VFR BLD AUTO: 27.8 % (ref 35–47)
HDLC SERPL-MCNC: 49 MG/DL
HGB BLD-MCNC: 7.3 G/DL (ref 11.7–15.7)
IMM GRANULOCYTES # BLD: 0 10E3/UL
IMM GRANULOCYTES NFR BLD: 0 %
IRON BINDING CAPACITY (ROCHE): 453 UG/DL (ref 240–430)
IRON SATN MFR SERPL: 3 % (ref 15–46)
IRON SERPL-MCNC: 12 UG/DL (ref 37–145)
LDLC SERPL CALC-MCNC: 94 MG/DL
LYMPHOCYTES # BLD AUTO: 1.5 10E3/UL (ref 0.8–5.3)
LYMPHOCYTES NFR BLD AUTO: 21 %
MCH RBC QN AUTO: 16.9 PG (ref 26.5–33)
MCHC RBC AUTO-ENTMCNC: 26.3 G/DL (ref 31.5–36.5)
MCV RBC AUTO: 64 FL (ref 78–100)
MICROALBUMIN UR-MCNC: 58.2 MG/L
MICROALBUMIN/CREAT UR: 59.94 MG/G CR (ref 0–25)
MONOCYTES # BLD AUTO: 0.6 10E3/UL (ref 0–1.3)
MONOCYTES NFR BLD AUTO: 8 %
NEUTROPHILS # BLD AUTO: 5 10E3/UL (ref 1.6–8.3)
NEUTROPHILS NFR BLD AUTO: 68 %
NONHDLC SERPL-MCNC: 114 MG/DL
NRBC # BLD AUTO: 0 10E3/UL
NRBC BLD AUTO-RTO: 0 /100
PLAT MORPH BLD: ABNORMAL
PLATELET # BLD AUTO: 303 10E3/UL (ref 150–450)
POTASSIUM SERPL-SCNC: 4.2 MMOL/L (ref 3.4–5.3)
PROT SERPL-MCNC: 7.8 G/DL (ref 6.4–8.3)
RBC # BLD AUTO: 4.33 10E6/UL (ref 3.8–5.2)
RBC MORPH BLD: ABNORMAL
RETICS # AUTO: 0.06 10E6/UL (ref 0.03–0.1)
RETICS/RBC NFR AUTO: 1.5 % (ref 0.5–2)
SODIUM SERPL-SCNC: 142 MMOL/L (ref 136–145)
T4 FREE SERPL-MCNC: 1.33 NG/DL (ref 0.9–1.7)
TRIGL SERPL-MCNC: 100 MG/DL
TSH SERPL DL<=0.005 MIU/L-ACNC: 5.3 UIU/ML (ref 0.3–4.2)
WBC # BLD AUTO: 7.3 10E3/UL (ref 4–11)

## 2023-08-16 PROCEDURE — 99214 OFFICE O/P EST MOD 30 MIN: CPT

## 2023-08-16 PROCEDURE — 82728 ASSAY OF FERRITIN: CPT

## 2023-08-16 PROCEDURE — 83036 HEMOGLOBIN GLYCOSYLATED A1C: CPT

## 2023-08-16 PROCEDURE — 82570 ASSAY OF URINE CREATININE: CPT

## 2023-08-16 PROCEDURE — 83550 IRON BINDING TEST: CPT

## 2023-08-16 PROCEDURE — 80061 LIPID PANEL: CPT

## 2023-08-16 PROCEDURE — 83540 ASSAY OF IRON: CPT

## 2023-08-16 PROCEDURE — 93000 ELECTROCARDIOGRAM COMPLETE: CPT

## 2023-08-16 PROCEDURE — 80053 COMPREHEN METABOLIC PANEL: CPT

## 2023-08-16 PROCEDURE — 82043 UR ALBUMIN QUANTITATIVE: CPT

## 2023-08-16 PROCEDURE — 84443 ASSAY THYROID STIM HORMONE: CPT

## 2023-08-16 PROCEDURE — 84439 ASSAY OF FREE THYROXINE: CPT

## 2023-08-16 PROCEDURE — 85025 COMPLETE CBC W/AUTO DIFF WBC: CPT

## 2023-08-16 PROCEDURE — 85045 AUTOMATED RETICULOCYTE COUNT: CPT

## 2023-08-16 PROCEDURE — 36415 COLL VENOUS BLD VENIPUNCTURE: CPT

## 2023-08-16 RX ORDER — LEVOTHYROXINE SODIUM 112 UG/1
112 TABLET ORAL DAILY
Qty: 90 TABLET | Refills: 0 | Status: SHIPPED | OUTPATIENT
Start: 2023-08-16 | End: 2023-10-20

## 2023-08-16 RX ORDER — HEPARIN SODIUM (PORCINE) LOCK FLUSH IV SOLN 100 UNIT/ML 100 UNIT/ML
5 SOLUTION INTRAVENOUS
Status: CANCELLED | OUTPATIENT
Start: 2023-08-16

## 2023-08-16 RX ORDER — ATORVASTATIN CALCIUM 20 MG/1
20 TABLET, FILM COATED ORAL DAILY
Qty: 90 TABLET | Refills: 0 | Status: SHIPPED | OUTPATIENT
Start: 2023-08-16 | End: 2024-05-28

## 2023-08-16 RX ORDER — HEPARIN SODIUM,PORCINE 10 UNIT/ML
5-20 VIAL (ML) INTRAVENOUS DAILY PRN
Status: CANCELLED | OUTPATIENT
Start: 2023-08-16

## 2023-08-16 RX ORDER — ROSUVASTATIN CALCIUM 10 MG/1
10 TABLET, COATED ORAL DAILY
Qty: 90 TABLET | Refills: 3 | Status: SHIPPED | OUTPATIENT
Start: 2023-08-16 | End: 2023-08-16 | Stop reason: ALTCHOICE

## 2023-08-16 RX ORDER — FERROUS SULFATE 325(65) MG
325 TABLET, DELAYED RELEASE (ENTERIC COATED) ORAL DAILY
Qty: 90 TABLET | Refills: 1 | Status: SHIPPED | OUTPATIENT
Start: 2023-08-16 | End: 2023-12-18

## 2023-08-16 NOTE — NURSING NOTE
"Chief Complaint   Patient presents with    Diabetes     initial BP (!) 142/80   Pulse 87   Temp 97.5  F (36.4  C) (Oral)   Resp 14   Ht 1.499 m (4' 11\")   Wt 92.5 kg (204 lb)   LMP  (LMP Unknown)   SpO2 97%   BMI 41.20 kg/m   Estimated body mass index is 41.2 kg/m  as calculated from the following:    Height as of this encounter: 1.499 m (4' 11\").    Weight as of this encounter: 92.5 kg (204 lb)..  bp completed using cuff size large  GADIEL VERDE LPN  "

## 2023-08-16 NOTE — PATIENT INSTRUCTIONS
Our lab is in [address]. I will comment on your results when they are all back. X-ray is also downstairs    Start taking baby aspirin daily. Follow up with stress test, and cardiology       04-Apr-2022

## 2023-08-16 NOTE — TELEPHONE ENCOUNTER
This writer placed one time blood transfusion order for patient to receive blood transfusion. Called over to infusion center at [phone] to assist setting up appointment for patient. Infusion center unable to get patient in until 09/01/2023. Patient was scheduled at 10:30 am.     Appointments in Next Year      Sep 01, 2023 10:30 AM  Infusion Visit with RH INFUSION CHAIR 9, RH CANCER INFUSION NURSE  [facility] ([facility] ) [phone]           Patient needs to go into hospital lab on 08/31/2023 (the day before infusion) to hospital lab to get ABO and Rh crossmatch to determine compatibility (order pended) and provider needs to fill out consent form (charge nurse from infusion center will fax form over to [facility] fax ([phone]).    Provider needs patient to recheck hemoglobin level on the following dates: 08/18/2023, 08/23/2023 and 08/28/2023 (orders with expected dates pended).    Appointments in Next Year      Aug 18, 2023  9:00 AM  LAB with RI LAB, VIDEO,   [facility] ([facility] ) [phone]     Aug 23, 2023  9:00 AM  LAB with RI LAB  [facility] ([facility] ) [phone]     Aug 28, 2023  9:00 AM  LAB with RI LAB  [facility] ([facility] ) [phone]          Called and spoke with patient using  # [id number] to relay provider message below as well as let patient know if she is having worsening of chest pain, shortness of breath, or blood in vomit/stool, she should present to the emergency room. Assisted patient to setting up all required lab appointments and provided patient with address to location of blood transfusion location. Advised patient to call clinic back or have family member call clinic (consent to communicate with son  and spouse) if any further questions.    Thank you,  Johnathan Villeda, Triage RN [facility] [place]  12:36 PM 8/16/2023

## 2023-08-16 NOTE — PROGRESS NOTES
"  Assessment & Plan     Chest pain, unspecified type  Patient has CP and dyspnea with exertion ongoing for about 8 months. Murmur also noted on exam- will get stress echo and refer to cardiology to assess  - Adult Cardiology [facility] Referral; Future  - EKG 12-lead complete w/read - Clinics  - XR Chest 2 Views; Future  - Echocardiogram Exercise Stress; Future  - CBC with platelets; Future    Symptomatic anemia  SOB with exertion and hgb of 7.3 and appears to be microcytic. Will have patient start iron supplement, and refer to transfusion center for blood transfusion. Will check hgb on 8/18, 8/23 and 8/28 to ensure stability.     Will likely order EGD when iron results are back.     Last colonoscopy in 2022    Recheck hgb was 7.0. patient sent to ED  - Reticulocyte count; Future  - Infusion Appointment Request; Future  - CBC with platelets; Future  - Reticulocyte count    Hypothyroidism, unspecified type  Update lab- may be altered due to inconsistent dosing  - TSH WITH FREE T4 REFLEX; Future  - levothyroxine (SYNTHROID/LEVOTHROID) 112 MCG tablet; Take 1 tablet (112 mcg) by mouth daily  - TSH WITH FREE T4 REFLEX  - T4 free    Iron deficiency anemia, unspecified iron deficiency anemia type  As above  - CBC with Platelets & Differential; Future  - Iron and iron binding capacity; Future  - Ferritin; Future  - CBC with Platelets & Differential  - Iron and iron binding capacity  - Ferritin  - ferrous sulfate (FE TABS) 325 (65 Fe) MG EC tablet; Take 1 tablet (325 mg) by mouth daily    Visit for screening mammogram  done  - MA SCREENING DIGITAL BILAT - Future  (s+30); Future    Morbid obesity (H)      Hypercholesteremia    - atorvastatin (LIPITOR) 20 MG tablet; Take 1 tablet (20 mg) by mouth daily       BMI:   Estimated body mass index is 41.2 kg/m  as calculated from the following:    Height as of this encounter: 1.499 m (4' 11\").    Weight as of this encounter: 92.5 kg (204 lb).     Keenan Baxter NP   HEALTH " [facility] ANAYA Rodriguez is a 68 year old, presenting for the following health issues:  Diabetes      8/16/2023     6:59 AM   Additional Questions   Roomed by Shanna AQUINO       HPI     Diabetes Follow-up    How often are you checking your blood sugar? Not at all  What concerns do you have today about your diabetes? None   Do you have any of these symptoms? (Select all that apply)  Blurry vision  Have you had a diabetic eye exam in the last 12 months? No      BP Readings from Last 2 Encounters:   02/08/22 128/88   01/11/22 (!) 130/90     Hemoglobin A1C (%)   Date Value   06/01/2021 6.0 (H)   02/03/2021 7.0 (H)     LDL Cholesterol Calculated (mg/dL)   Date Value   06/01/2021 66   02/03/2021 91       Hyperlipidemia Follow-Up    Are you regularly taking any medication or supplement to lower your cholesterol?   No  Are you having muscle aches or other side effects that you think could be caused by your cholesterol lowering medication?  No  How many servings of fruits and vegetables do you eat daily?  2-3  On average, how many sweetened beverages do you drink each day (Examples: soda, juice, sweet tea, etc.  Do NOT count diet or artificially sweetened beverages)?   0  How many days per week do you exercise enough to make your heart beat faster? 3 or less  How many minutes a day do you exercise enough to make your heart beat faster? 9 or less  How many days per week do you miss taking your medication? Pt has been without some of her meds so not taking.  What makes it hard for you to take your medications?   Has bnot had meds due to not coming in for an appointment .      Has been taking husbands 112 mcg levothyroxine. She does not do this regularly.     Left arm numbness   Onset: for one month  Location: left arm    Patient gets SOB when she is walking. She feels pressure while walking, and then it goes away when she is done walking  Onset: 8 months   Location: center of chest  Characteristics:  pressure and SOB  Aggravating/relieving: walking/activity   Radiation: none    She was given a medicine in [place] that helped but she does not know what it was called.     Review of Systems   Constitutional, HEENT, cardiovascular, pulmonary, gi and gu systems are negative, except as otherwise noted.      Objective    LMP  (LMP Unknown)   There is no height or weight on file to calculate BMI.  Physical Exam   GENERAL: alert and no distress  EYES: Eyes grossly normal to inspection  HENT: ear canals and TM's normal, nose and mouth without ulcers or lesions  NECK: no adenopathy, no asymmetry, masses, or scars and thyroid normal to palpation  RESP: lungs clear to auscultation - no rales, rhonchi or wheezes  CV: regular rate and rhythm, normal S1 S2, no S3 or S4, no murmur, click or rub, no peripheral edema and peripheral pulses strong  ABDOMEN: soft, nontender, no hepatosplenomegaly, no masses and bowel sounds normal  MS: no gross musculoskeletal defects noted, no edema  SKIN: no suspicious lesions or rashes  NEURO: Normal strength and tone, mentation intact and speech normal  PSYCH: mentation appears normal, affect normal/bright

## 2023-08-16 NOTE — TELEPHONE ENCOUNTER
One of your labs came back and it showed that your hemoglobin is very low.  chest pressure and shortness of breath can be symptoms of low hemoglobin.  At this time I would recommend getting blood transfusion which I would appreciate nursring help to set up for patient. If the blood transfusion is longer than 1 week away we will need to recheck your labs in the meantime.      I am waiting for the rest of your labs come back but I have sent an iron supplement that you should take with vitamin C to increase absorption.      Inform patient we will need to find a cause of your low hemoglobin.  Once I hear about when the transfusion is set up I will let you know the next steps.    The  most common cause of low hemoglobin in a postmenopausal woman without known bleeding would be bleeding from a stomach or intestines.  We will likely have to set up a test where they use a camera to look into your stomach.

## 2023-08-17 ENCOUNTER — APPOINTMENT (OUTPATIENT)
Dept: INTERPRETER SERVICES | Facility: CLINIC | Age: 68
End: 2023-08-17
Payer: COMMERCIAL

## 2023-08-17 ENCOUNTER — TELEPHONE (OUTPATIENT)
Dept: INTERNAL MEDICINE | Facility: CLINIC | Age: 68
End: 2023-08-17
Payer: COMMERCIAL

## 2023-08-17 DIAGNOSIS — D50.9 IRON DEFICIENCY ANEMIA, UNSPECIFIED IRON DEFICIENCY ANEMIA TYPE: Primary | ICD-10-CM

## 2023-08-17 NOTE — TELEPHONE ENCOUNTER
Patient called with [place]  to discuss blood transfusion, order for fecal occult and order for hematology placed to assess cause of anemia. She would like to explore other options before getting blood transfusion.     There was no answer. Left voice mail will try again later

## 2023-08-18 ENCOUNTER — LAB (OUTPATIENT)
Dept: LAB | Facility: CLINIC | Age: 68
End: 2023-08-18
Payer: COMMERCIAL

## 2023-08-18 ENCOUNTER — PATIENT OUTREACH (OUTPATIENT)
Dept: ONCOLOGY | Facility: CLINIC | Age: 68
End: 2023-08-18

## 2023-08-18 ENCOUNTER — TELEPHONE (OUTPATIENT)
Dept: INTERNAL MEDICINE | Facility: CLINIC | Age: 68
End: 2023-08-18

## 2023-08-18 ENCOUNTER — HOSPITAL ENCOUNTER (EMERGENCY)
Facility: CLINIC | Age: 68
Discharge: HOME | End: 2023-08-18
Attending: EMERGENCY MEDICINE | Admitting: EMERGENCY MEDICINE
Payer: COMMERCIAL

## 2023-08-18 VITALS
DIASTOLIC BLOOD PRESSURE: 79 MMHG | TEMPERATURE: 97.5 F | RESPIRATION RATE: 16 BRPM | SYSTOLIC BLOOD PRESSURE: 129 MMHG | OXYGEN SATURATION: 100 % | HEART RATE: 58 BPM

## 2023-08-18 DIAGNOSIS — D64.9 SYMPTOMATIC ANEMIA: ICD-10-CM

## 2023-08-18 DIAGNOSIS — D50.9 IRON DEFICIENCY ANEMIA, UNSPECIFIED IRON DEFICIENCY ANEMIA TYPE: Primary | ICD-10-CM

## 2023-08-18 DIAGNOSIS — D50.9 IRON DEFICIENCY ANEMIA, UNSPECIFIED IRON DEFICIENCY ANEMIA TYPE: ICD-10-CM

## 2023-08-18 DIAGNOSIS — R07.9 CHEST PAIN, UNSPECIFIED TYPE: ICD-10-CM

## 2023-08-18 LAB
ABO/RH(D): NORMAL
ALBUMIN SERPL BCG-MCNC: 4.2 G/DL (ref 3.5–5.2)
ALP SERPL-CCNC: 100 U/L (ref 35–104)
ALT SERPL W P-5'-P-CCNC: 21 U/L (ref 0–50)
ANION GAP SERPL CALCULATED.3IONS-SCNC: 10 MMOL/L (ref 7–15)
ANTIBODY SCREEN: NEGATIVE
AST SERPL W P-5'-P-CCNC: 32 U/L (ref 0–45)
BASOPHILS # BLD AUTO: 0 10E3/UL (ref 0–0.2)
BASOPHILS NFR BLD AUTO: 1 %
BILIRUB SERPL-MCNC: 0.5 MG/DL
BLD PROD TYP BPU: NORMAL
BLOOD COMPONENT TYPE: NORMAL
BUN SERPL-MCNC: 15.9 MG/DL (ref 8–23)
CALCIUM SERPL-MCNC: 8.5 MG/DL (ref 8.8–10.2)
CHLORIDE SERPL-SCNC: 106 MMOL/L (ref 98–107)
CODING SYSTEM: NORMAL
CREAT SERPL-MCNC: 0.58 MG/DL (ref 0.51–0.95)
CROSSMATCH: NORMAL
DEPRECATED HCO3 PLAS-SCNC: 21 MMOL/L (ref 22–29)
EOSINOPHIL # BLD AUTO: 0.1 10E3/UL (ref 0–0.7)
EOSINOPHIL NFR BLD AUTO: 1 %
ERYTHROCYTE [DISTWIDTH] IN BLOOD BY AUTOMATED COUNT: 20.7 % (ref 10–15)
ERYTHROCYTE [DISTWIDTH] IN BLOOD BY AUTOMATED COUNT: 20.9 % (ref 10–15)
GFR SERPL CREATININE-BSD FRML MDRD: >90 ML/MIN/1.73M2
GLUCOSE SERPL-MCNC: 136 MG/DL (ref 70–99)
HCT VFR BLD AUTO: 25.9 % (ref 35–47)
HCT VFR BLD AUTO: 26 % (ref 35–47)
HGB BLD-MCNC: 7 G/DL (ref 11.7–15.7)
HGB BLD-MCNC: 7.1 G/DL (ref 11.7–15.7)
IMM GRANULOCYTES # BLD: 0 10E3/UL
IMM GRANULOCYTES NFR BLD: 0 %
ISSUE DATE AND TIME: NORMAL
LYMPHOCYTES # BLD AUTO: 1.5 10E3/UL (ref 0.8–5.3)
LYMPHOCYTES NFR BLD AUTO: 24 %
MCH RBC QN AUTO: 17 PG (ref 26.5–33)
MCH RBC QN AUTO: 17.2 PG (ref 26.5–33)
MCHC RBC AUTO-ENTMCNC: 27 G/DL (ref 31.5–36.5)
MCHC RBC AUTO-ENTMCNC: 27.3 G/DL (ref 31.5–36.5)
MCV RBC AUTO: 63 FL (ref 78–100)
MCV RBC AUTO: 63 FL (ref 78–100)
MONOCYTES # BLD AUTO: 0.5 10E3/UL (ref 0–1.3)
MONOCYTES NFR BLD AUTO: 8 %
NEUTROPHILS # BLD AUTO: 4.2 10E3/UL (ref 1.6–8.3)
NEUTROPHILS NFR BLD AUTO: 66 %
NRBC # BLD AUTO: 0 10E3/UL
NRBC BLD AUTO-RTO: 0 /100
PLATELET # BLD AUTO: 300 10E3/UL (ref 150–450)
PLATELET # BLD AUTO: 302 10E3/UL (ref 150–450)
POTASSIUM SERPL-SCNC: 4.3 MMOL/L (ref 3.4–5.3)
PROT SERPL-MCNC: 7.3 G/DL (ref 6.4–8.3)
RBC # BLD AUTO: 4.12 10E6/UL (ref 3.8–5.2)
RBC # BLD AUTO: 4.12 10E6/UL (ref 3.8–5.2)
SODIUM SERPL-SCNC: 137 MMOL/L (ref 136–145)
SPECIMEN EXPIRATION DATE: NORMAL
TROPONIN T SERPL HS-MCNC: 8 NG/L
UNIT ABO/RH: NORMAL
UNIT NUMBER: NORMAL
UNIT STATUS: NORMAL
UNIT TYPE ISBT: 5100
WBC # BLD AUTO: 6 10E3/UL (ref 4–11)
WBC # BLD AUTO: 6.3 10E3/UL (ref 4–11)

## 2023-08-18 PROCEDURE — 80053 COMPREHEN METABOLIC PANEL: CPT | Performed by: EMERGENCY MEDICINE

## 2023-08-18 PROCEDURE — 36415 COLL VENOUS BLD VENIPUNCTURE: CPT | Performed by: EMERGENCY MEDICINE

## 2023-08-18 PROCEDURE — 86850 RBC ANTIBODY SCREEN: CPT | Performed by: EMERGENCY MEDICINE

## 2023-08-18 PROCEDURE — 82274 ASSAY TEST FOR BLOOD FECAL: CPT

## 2023-08-18 PROCEDURE — P9016 RBC LEUKOCYTES REDUCED: HCPCS | Performed by: EMERGENCY MEDICINE

## 2023-08-18 PROCEDURE — 86923 COMPATIBILITY TEST ELECTRIC: CPT | Performed by: EMERGENCY MEDICINE

## 2023-08-18 PROCEDURE — 84484 ASSAY OF TROPONIN QUANT: CPT | Performed by: EMERGENCY MEDICINE

## 2023-08-18 PROCEDURE — 85027 COMPLETE CBC AUTOMATED: CPT | Performed by: EMERGENCY MEDICINE

## 2023-08-18 PROCEDURE — 85025 COMPLETE CBC W/AUTO DIFF WBC: CPT

## 2023-08-18 PROCEDURE — 86901 BLOOD TYPING SEROLOGIC RH(D): CPT | Performed by: EMERGENCY MEDICINE

## 2023-08-18 PROCEDURE — 36415 COLL VENOUS BLD VENIPUNCTURE: CPT

## 2023-08-18 PROCEDURE — 99285 EMERGENCY DEPT VISIT HI MDM: CPT | Mod: 25

## 2023-08-18 PROCEDURE — 36430 TRANSFUSION BLD/BLD COMPNT: CPT

## 2023-08-18 PROCEDURE — 85027 COMPLETE CBC AUTOMATED: CPT | Mod: 59

## 2023-08-18 ASSESSMENT — ACTIVITIES OF DAILY LIVING (ADL)
ADLS_ACUITY_SCORE: 35
ADLS_ACUITY_SCORE: 35

## 2023-08-18 NOTE — DISCHARGE INSTRUCTIONS
Follow-up with your doctor early next week for hemoglobin recheck.   Schedule appointment with gastroenterology to discuss obtaining endoscopy for evaluation of your anemia.     Return to emergency department for worsening chest pain/dyspnea, loss of consciousness, dark black/tarry stools, or for any other concerns.     Renu un seguimiento con vaughan médico a principios de la próxima semana para volver a controlar la hemoglobina.  Programe judith pino con gastroenterología para discutir la obtención de judith endoscopia para la evaluación de vaughan anemia.    Regrese al departamento de emergencias si empeora el dolor torácico/disnea, pérdida del conocimiento, heces de color ermelinda oscuro/alquitranadas o por cualquier otra inquietud.

## 2023-08-18 NOTE — TELEPHONE ENCOUNTER
Keenan requesting patient be called to go to ER due to low hemoglobin 7.1 with symptoms of chest pressure and shortness of breath.     Called patient with  ID [id number]. Informed patient that she needs to go to ER for evaluation due to decreasing hemoglobin. She is camping right now and not near a hospital, she is not sure what town they are near. Patient states they will return to [place] and go to [facility].     Birdie Ayala RN  [facility]

## 2023-08-18 NOTE — ED PROVIDER NOTES
History     Chief Complaint:  Abnormal Labs       HPI   Jennifer Fallon is a 68 year old female with a history of iron deficiency anemia, diabetes, hypothyroidism, and more who presents with chief complaint abnormal labs.  Patient had hemoglobin drawn today and it was found to be 7.0.  She was then sent to emergency department by her clinic for blood transfusion.  She denies any chest pain or shortness of breath.  She does report a little bit of chest tightness with exertion intermittently over the last month.  On visit with her primary care physician several days ago, it was discovered that patient was anemic and hemoglobin at that time was 7.3.  Patient denies any dark black or tarry stools.  She denies any dizziness or loss of consciousness.  Chart review suggests primary clinic with setting patient up for blood transfusion, but it was not scheduled until 2 weeks from now.  Patient has subsequently been started on iron tablets.  Patient states she is unsure why she was sent to the emergency department today as she feels fine.    Independent Historian:   None - Patient Only    Review of External Notes:   Reviewed multiple recent outpatient notes regarding patient's anemia, scheduling blood transfusion, as well as previous labs were patient was also found to have anemia, though not to this extent.      Medications:    Lipitor  Levothyroxine     Past Medical History:    Diabetes mellitus   Hypothyroidism   Hyperlipidemia   Iron deficiency anemia   Morbid obesity    Past Surgical History:    Cholecystectomy   Colonoscopy   Tubal ligation       Physical Exam   Patient Vitals for the past 24 hrs:   BP Temp Temp src Pulse Resp SpO2   08/18/23 1124 (!) 146/72 97.5  F (36.4  C) Temporal 75 18 97 %        Physical Exam  Nursing note and vitals reviewed.  HENT:   Mouth/Throat: Moist mucous membranes.   Eyes: EOMI, nonicteric sclera  Cardiovascular: Normal rate, regular rhythm, no murmurs, rubs, or  gallops  Pulmonary/Chest: Effort normal and breath sounds normal. No respiratory distress. No wheezes. No rales.   Abdominal: Soft. Nontender, nondistended, no guarding or rigidity.   Musculoskeletal: Normal range of motion.   Neurological: Alert. Moves all extremities spontaneously.   Skin: Skin is warm and dry. No rash noted.         Emergency Department Course         Laboratory:  Labs Ordered and Resulted from Time of ED Arrival to Time of ED Departure   COMPREHENSIVE METABOLIC PANEL - Abnormal       Result Value    Sodium 137      Potassium 4.3      Chloride 106      Carbon Dioxide (CO2) 21 (*)     Anion Gap 10      Urea Nitrogen 15.9      Creatinine 0.58      Calcium 8.5 (*)     Glucose 136 (*)     Alkaline Phosphatase 100      AST 32      ALT 21      Protein Total 7.3      Albumin 4.2      Bilirubin Total 0.5      GFR Estimate >90     CBC WITH PLATELETS AND DIFFERENTIAL - Abnormal    WBC Count 6.3      RBC Count 4.12      Hemoglobin 7.1 (*)     Hematocrit 26.0 (*)     MCV 63 (*)     MCH 17.2 (*)     MCHC 27.3 (*)     RDW 20.7 (*)     Platelet Count 302      % Neutrophils 66      % Lymphocytes 24      % Monocytes 8      % Eosinophils 1      % Basophils 1      % Immature Granulocytes 0      NRBCs per 100 WBC 0      Absolute Neutrophils 4.2      Absolute Lymphocytes 1.5      Absolute Monocytes 0.5      Absolute Eosinophils 0.1      Absolute Basophils 0.0      Absolute Immature Granulocytes 0.0      Absolute NRBCs 0.0     TROPONIN T, HIGH SENSITIVITY - Normal    Troponin T, High Sensitivity 8     TYPE AND SCREEN, ADULT    ABO/RH(D) O POS      SPECIMEN EXPIRATION DATE [id number]     PREPARE RED BLOOD CELLS (UNIT)   ABO/RH TYPE AND SCREEN          Emergency Department Course & Assessments:   Interventions:  1 unit packed red cells      Independent Interpretation (X-rays, CTs, rhythm strip):  None  Social Determinants of Health affecting care:   None    Disposition:  The patient was discharged to home.      Impression & Plan      Medical Decision Making:  Patient presents with chief complaint of abnormal labs.  She had her hemoglobin drawn earlier today and it was found to be 7.0.  She was subsequently sent to emergency department by her NP.  Patient states she is uncertain why she was sent to the emergency department as she was also anemic several days ago with plan for outpatient blood transfusion on the first of next month.  Overall, from reviewing documentation, it appears there was concern for ongoing chest pain/dyspnea, the patient states it is only with exertion, and is not having any today.  I discussed receiving blood transfusion in the emergency department and patient was amenable after discussing the risks and benefits.  This was completed without complication.  Overall, patient has history of chronic anemia.  In the absence of acute symptoms, I believe this can continue to be worked up as an outpatient.  Primary care records note that there was plans for gastroenterology referral which I discussed with patient would be an appropriate next up.  She was advised to return to emergency department for chest pain, shortness of breath, loss of consciousness, bloody stools, or for any other concerns.  Otherwise, I instructed her to follow-up with primary care for repeat hemoglobin early next week.  She is discharged in stable condition.  All questions answered.      Please note that entirety of visit was conducted with the assistance of formal Italian  through [employer].    Diagnosis:    ICD-10-CM    1. Iron deficiency anemia, unspecified iron deficiency anemia type  D50.9       2. Symptomatic anemia  D64.9                     Hossein Edge MD  08/20/23 0519

## 2023-08-18 NOTE — PROGRESS NOTES
Hematology referral reviewed for [facility], see below.    Referral reason: Anemia, has discussed with referring provider and outpatient transfusion in process, approval to use return slots with Dr Avila at [place] 8/24    Current abnormal labs: Available in Chart Review    Outreach: Call not placed to patient regarding referral.    Plan: Triage instructions updated and sent to NPS for completion.

## 2023-08-18 NOTE — ED TRIAGE NOTES
Pt arrives with c/o low hgb on labs done this AM. Hgb 7.0. Pt was sent by the clinic and reports she doesn't know why she is here exactly.      Triage Assessment       Row Name 08/18/23 1123       Triage Assessment (Adult)    Airway WDL WDL       Respiratory WDL    Respiratory WDL WDL       Skin Circulation/Temperature WDL    Skin Circulation/Temperature WDL WDL       Cardiac WDL    Cardiac WDL WDL       Peripheral/Neurovascular WDL    Peripheral Neurovascular WDL WDL       Cognitive/Neuro/Behavioral WDL    Cognitive/Neuro/Behavioral WDL WDL

## 2023-08-19 LAB — HEMOCCULT STL QL IA: NEGATIVE

## 2023-08-23 ENCOUNTER — LAB (OUTPATIENT)
Dept: LAB | Facility: CLINIC | Age: 68
End: 2023-08-23
Payer: COMMERCIAL

## 2023-08-23 DIAGNOSIS — D50.9 IRON DEFICIENCY ANEMIA, UNSPECIFIED IRON DEFICIENCY ANEMIA TYPE: ICD-10-CM

## 2023-08-23 LAB
BASOPHILS # BLD AUTO: 0 10E3/UL (ref 0–0.2)
BASOPHILS NFR BLD AUTO: 1 %
EOSINOPHIL # BLD AUTO: 0.1 10E3/UL (ref 0–0.7)
EOSINOPHIL NFR BLD AUTO: 2 %
ERYTHROCYTE [DISTWIDTH] IN BLOOD BY AUTOMATED COUNT: 24.8 % (ref 10–15)
HCT VFR BLD AUTO: 30.6 % (ref 35–47)
HGB BLD-MCNC: 8.4 G/DL (ref 11.7–15.7)
IMM GRANULOCYTES # BLD: 0 10E3/UL
IMM GRANULOCYTES NFR BLD: 0 %
LYMPHOCYTES # BLD AUTO: 1.1 10E3/UL (ref 0.8–5.3)
LYMPHOCYTES NFR BLD AUTO: 17 %
MCH RBC QN AUTO: 18.4 PG (ref 26.5–33)
MCHC RBC AUTO-ENTMCNC: 27.5 G/DL (ref 31.5–36.5)
MCV RBC AUTO: 67 FL (ref 78–100)
MONOCYTES # BLD AUTO: 0.4 10E3/UL (ref 0–1.3)
MONOCYTES NFR BLD AUTO: 6 %
NEUTROPHILS # BLD AUTO: 4.8 10E3/UL (ref 1.6–8.3)
NEUTROPHILS NFR BLD AUTO: 74 %
NRBC # BLD AUTO: 0 10E3/UL
NRBC BLD AUTO-RTO: 0 /100
PLATELET # BLD AUTO: 260 10E3/UL (ref 150–450)
RBC # BLD AUTO: 4.56 10E6/UL (ref 3.8–5.2)
WBC # BLD AUTO: 6.5 10E3/UL (ref 4–11)

## 2023-08-23 PROCEDURE — 36415 COLL VENOUS BLD VENIPUNCTURE: CPT

## 2023-08-23 PROCEDURE — 85025 COMPLETE CBC W/AUTO DIFF WBC: CPT

## 2023-08-23 NOTE — PROGRESS NOTES
[facility]    Hematology/Oncology New Patient Note      Today's Date: 8/24/23    Reason for Consultation: Anemia  Referring Provider: Keenan Baxter NP      HISTORY OF PRESENT ILLNESS: Jennifer Fallon is a 68 year old female who presents for evaluation of anemia.   Seen accompanied by her  with help of an .      She was seen for a routine clinic visit on 8/16 and noted to be anemic with hemoglobin 7.3 and MCV 64.  Prior Hemoglobin from 2021 demonstrated mild anemia with hemoglobin 10.0 and MCV 78.  Repeat lab on 8/18 demonstrated hemoglobin of 7.0 and she was sent to the ED where she was administered a blood transfusion.    Had noted some mild exertional dyspnea prior to the transfusion which has now resolved.  Denies any appreciable fatigue and has otherwise been feeling well.  Last colonoscopy was about 3 years ago.  Taking oral iron for past week, has taken in the past years ago but discontinued, does not have any symptoms with it.  Does chew ice, has increased over the past few months.  No dyspepsia, abdominal bloating, melena or hematochezia or abdominal pain.  Appetite and energy are good.  Has been told she has been iron deficient in the past.      REVIEW OF SYSTEMS:   A 14 point ROS was reviewed with pertinent positives and negatives in the HPI.        HOME MEDICATIONS:  Current Outpatient Medications   Medication Sig Dispense Refill    atorvastatin (LIPITOR) 20 MG tablet Take 1 tablet (20 mg) by mouth daily 90 tablet 0    blood glucose (ACCU-CHEK ARGENIS PLUS) test strip Use to test blood sugars 1 times daily or as directed. 300 strip 0    ferrous sulfate (FE TABS) 325 (65 Fe) MG EC tablet Take 1 tablet (325 mg) by mouth daily 90 tablet 1    levothyroxine (SYNTHROID/LEVOTHROID) 112 MCG tablet Take 1 tablet (112 mcg) by mouth daily 90 tablet 0    vitamin D3 (CHOLECALCIFEROL) 50 mcg (2000 units) tablet Take 2 tablets (100 mcg) by mouth daily 200 tablet 3  "        ALLERGIES:  No Known Allergies      PAST MEDICAL HISTORY:  Past Medical History:   Diagnosis Date    Abnormal Pap smear of cervix 2012    approx date - pt reports in 10/31/18 visit notes that \"additional testing\" was performed    CARDIOVASCULAR SCREENING; LDL GOAL LESS THAN 130     Constipation     Diabetes (H) diagnosed 2016    controlled with diet only; no meds.    Diarrhea     Hypothyroidism          PAST SURGICAL HISTORY:  Past Surgical History:   Procedure Laterality Date    CHOLECYSTECTOMY  2004    COLONOSCOPY Left 6/23/2017    Procedure: COMBINED COLONOSCOPY, SINGLE OR MULTIPLE BIOPSY/POLYPECTOMY BY BIOPSY;  COLONOSCOPY with polypectomy of colon polyp by cold biopsy  ;  Surgeon: Chang Beasley MD;  Location:  GI    TUBAL LIGATION  1987         SOCIAL HISTORY:  Social History     Socioeconomic History    Marital status:      Spouse name: Not on file    Number of children: Not on file    Years of education: Not on file    Highest education level: Not on file   Occupational History    Not on file   Tobacco Use    Smoking status: Never    Smokeless tobacco: Never   Vaping Use    Vaping Use: Never used   Substance and Sexual Activity    Alcohol use: Yes     Alcohol/week: 0.0 standard drinks of alcohol     Comment: Socially    Drug use: No    Sexual activity: Yes     Partners: Male   Other Topics Concern    Parent/sibling w/ CABG, MI or angioplasty before 65F 55M? Not Asked   Social History Narrative    Not on file     Social Determinants of Health     Financial Resource Strain: Not on file   Food Insecurity: Not on file   Transportation Needs: Not on file   Physical Activity: Not on file   Stress: Not on file   Social Connections: Not on file   Intimate Partner Violence: Not on file   Housing Stability: Not on file         FAMILY HISTORY:  Family History   Problem Relation Age of Onset    Diabetes Brother     Diabetes Brother     Coronary Artery Disease Mother     Breast Cancer No family hx " "of     Colon Cancer No family hx of     Prostate Cancer No family hx of     Other Cancer No family hx of          PHYSICAL EXAM:  Vital signs:  /61   Pulse 62   Temp 98.1  F (36.7  C) (Oral)   Resp 16   Ht 1.499 m (4' 11\")   Wt 92.4 kg (203 lb 9.6 oz)   LMP  (LMP Unknown)   SpO2 100%   BMI 41.12 kg/m     GENERAL/CONSTITUTIONAL: Appears well, no acute distress.  HEENT: Gaze conjugate, pupils equal and round. No scleral icterus, no pallor.  LYMPH: No cervical, supraclavicular, axillary or inguinal adenopathy.   RESPIRATORY: Lungs clear to auscultation bilaterally. No crackles or wheezing.   CARDIOVASCULAR: Regular rate and rhythm without murmurs, gallops, or rubs.    GASTROINTESTINAL: No organomegaly, masses, or tenderness.  No guarding.  No distention.  MUSCULOSKELETAL: No clubbing, cyanosis or edema.  NEUROLOGIC:  Alert and oriented, answers questions appropriately.  No evidence of ataxia or tremor.  Speech fluent.  INTEGUMENTARY: No rashes or jaundice.      LABS:   Latest Reference Range & Units 08/18/23 09:00 08/18/23 09:15 08/18/23 12:38 08/23/23 08:57   Sodium 136 - 145 mmol/L   137    Potassium 3.4 - 5.3 mmol/L   4.3    Chloride 98 - 107 mmol/L   106    Carbon Dioxide (CO2) 22 - 29 mmol/L   21 (L)    Urea Nitrogen 8.0 - 23.0 mg/dL   15.9    Creatinine 0.51 - 0.95 mg/dL   0.58    GFR Estimate >60 mL/min/1.73m2   >90    Calcium 8.8 - 10.2 mg/dL   8.5 (L)    Anion Gap 7 - 15 mmol/L   10    Albumin 3.5 - 5.2 g/dL   4.2    Protein Total 6.4 - 8.3 g/dL   7.3    Alkaline Phosphatase 35 - 104 U/L   100    ALT 0 - 50 U/L   21    AST 0 - 45 U/L   32    Bilirubin Total <=1.2 mg/dL   0.5    Glucose 70 - 99 mg/dL   136 (H)    Occult Blood Scn FIT Negative  Negative      Troponin T, High Sensitivity <=14 ng/L   8    WBC 4.0 - 11.0 10e3/uL  6.0 6.3 6.5   Hemoglobin 11.7 - 15.7 g/dL  7.0 (L) 7.1 (L) 8.4 (L)   Hematocrit 35.0 - 47.0 %  25.9 (L) 26.0 (L) 30.6 (L)   Platelet Count 150 - 450 10e3/uL  300 302 260 "   RBC Count 3.80 - 5.20 10e6/uL  4.12 4.12 4.56   MCV 78 - 100 fL  63 (L) 63 (L) 67 (L)   MCH 26.5 - 33.0 pg  17.0 (L) 17.2 (L) 18.4 (L)   MCHC 31.5 - 36.5 g/dL  27.0 (L) 27.3 (L) 27.5 (L)   RDW 10.0 - 15.0 %  20.9 (H) 20.7 (H) 24.8 (H)   % Neutrophils %   66 74   % Lymphocytes %   24 17   % Monocytes %   8 6   % Eosinophils %   1 2   % Basophils %   1 1   Absolute Basophils 0.0 - 0.2 10e3/uL   0.0 0.0   Absolute Eosinophils 0.0 - 0.7 10e3/uL   0.1 0.1   Absolute Immature Granulocytes <=0.4 10e3/uL   0.0 0.0   Absolute Lymphocytes 0.8 - 5.3 10e3/uL   1.5 1.1   Absolute Monocytes 0.0 - 1.3 10e3/uL   0.5 0.4   % Immature Granulocytes %   0 0   Absolute Neutrophils 1.6 - 8.3 10e3/uL   4.2 4.8   Absolute NRBCs 10e3/uL   0.0 0.0   NRBCs per 100 WBC <1 /100   0 0   (L): Data is abnormally low  (H): Data is abnormally high   Latest Reference Range & Units 08/16/23 08:23   Iron 37 - 145 ug/dL 12 (L)   Iron Binding Capacity 240 - 430 ug/dL 453 (H)   Iron Sat Index 15 - 46 % 3 (L)   LDL Cholesterol Calculated <=100 mg/dL 94   (L): Data is abnormally low  (H): Data is abnormally high          ASSESSMENT:  Iron deficiency anemia in a postmenopausal woman.  No GI symptoms but suspicious for occult GI blood loss.  Good response to transfusion and asymptomatic.  She tolerates oral iron well so will increase to 325 mg bid with vitamin C at each dose.  Will also proceed with EGD/colonoscopy and check TTG antibodies to assess for celiac disease.  If no source identified on EGD/colonoscopy then will need small bowel Pillcam study.      PLAN:    Labs today to include CBC, reticulocyte count, TTG IgG and IgA.  EGD/colonoscopy.  Ferrous sulfate 325 mg bid with OTC vitamin C.  RTC in one month with CBC.  If not improved (>10) consider IV iron.      Total time is 45 minutes including record review, face to face time, documentation and orders.      Alessio Avila MD  Hematology/Oncology  [facility]

## 2023-08-24 ENCOUNTER — LAB (OUTPATIENT)
Dept: ONCOLOGY | Facility: CLINIC | Age: 68
End: 2023-08-24
Payer: COMMERCIAL

## 2023-08-24 ENCOUNTER — PRE VISIT (OUTPATIENT)
Dept: ONCOLOGY | Facility: CLINIC | Age: 68
End: 2023-08-24
Payer: COMMERCIAL

## 2023-08-24 ENCOUNTER — ONCOLOGY VISIT (OUTPATIENT)
Dept: ONCOLOGY | Facility: CLINIC | Age: 68
End: 2023-08-24
Payer: COMMERCIAL

## 2023-08-24 ENCOUNTER — PATIENT OUTREACH (OUTPATIENT)
Dept: ONCOLOGY | Facility: CLINIC | Age: 68
End: 2023-08-24

## 2023-08-24 VITALS
HEART RATE: 62 BPM | BODY MASS INDEX: 41.04 KG/M2 | OXYGEN SATURATION: 100 % | TEMPERATURE: 98.1 F | SYSTOLIC BLOOD PRESSURE: 130 MMHG | WEIGHT: 203.6 LBS | DIASTOLIC BLOOD PRESSURE: 61 MMHG | RESPIRATION RATE: 16 BRPM | HEIGHT: 59 IN

## 2023-08-24 DIAGNOSIS — D50.9 IRON DEFICIENCY ANEMIA, UNSPECIFIED IRON DEFICIENCY ANEMIA TYPE: ICD-10-CM

## 2023-08-24 LAB
BASOPHILS # BLD AUTO: 0.1 10E3/UL (ref 0–0.2)
BASOPHILS NFR BLD AUTO: 1 %
ELLIPTOCYTES BLD QL SMEAR: SLIGHT
EOSINOPHIL # BLD AUTO: 0.1 10E3/UL (ref 0–0.7)
EOSINOPHIL NFR BLD AUTO: 2 %
ERYTHROCYTE [DISTWIDTH] IN BLOOD BY AUTOMATED COUNT: 25.3 % (ref 10–15)
HCT VFR BLD AUTO: 31 % (ref 35–47)
HGB BLD-MCNC: 8.6 G/DL (ref 11.7–15.7)
IMM GRANULOCYTES # BLD: 0 10E3/UL
IMM GRANULOCYTES NFR BLD: 1 %
LYMPHOCYTES # BLD AUTO: 1.2 10E3/UL (ref 0.8–5.3)
LYMPHOCYTES NFR BLD AUTO: 19 %
MCH RBC QN AUTO: 18.7 PG (ref 26.5–33)
MCHC RBC AUTO-ENTMCNC: 27.7 G/DL (ref 31.5–36.5)
MCV RBC AUTO: 67 FL (ref 78–100)
MONOCYTES # BLD AUTO: 0.4 10E3/UL (ref 0–1.3)
MONOCYTES NFR BLD AUTO: 6 %
NEUTROPHILS # BLD AUTO: 4.4 10E3/UL (ref 1.6–8.3)
NEUTROPHILS NFR BLD AUTO: 71 %
NRBC # BLD AUTO: 0 10E3/UL
NRBC BLD AUTO-RTO: 0 /100
PLAT MORPH BLD: ABNORMAL
PLATELET # BLD AUTO: 264 10E3/UL (ref 150–450)
RBC # BLD AUTO: 4.6 10E6/UL (ref 3.8–5.2)
RBC MORPH BLD: ABNORMAL
RETICS # AUTO: 0.07 10E6/UL (ref 0.03–0.1)
RETICS/RBC NFR AUTO: 1.5 % (ref 0.5–2)
VIT B12 SERPL-MCNC: 542 PG/ML (ref 232–1245)
WBC # BLD AUTO: 6.2 10E3/UL (ref 4–11)

## 2023-08-24 PROCEDURE — 36415 COLL VENOUS BLD VENIPUNCTURE: CPT

## 2023-08-24 PROCEDURE — G0463 HOSPITAL OUTPT CLINIC VISIT: HCPCS | Performed by: INTERNAL MEDICINE

## 2023-08-24 PROCEDURE — 85025 COMPLETE CBC W/AUTO DIFF WBC: CPT | Performed by: INTERNAL MEDICINE

## 2023-08-24 PROCEDURE — 86364 TISS TRNSGLTMNASE EA IG CLAS: CPT | Mod: 59 | Performed by: INTERNAL MEDICINE

## 2023-08-24 PROCEDURE — 85045 AUTOMATED RETICULOCYTE COUNT: CPT | Performed by: INTERNAL MEDICINE

## 2023-08-24 PROCEDURE — 82607 VITAMIN B-12: CPT | Performed by: INTERNAL MEDICINE

## 2023-08-24 PROCEDURE — 99204 OFFICE O/P NEW MOD 45 MIN: CPT | Performed by: INTERNAL MEDICINE

## 2023-08-24 ASSESSMENT — PAIN SCALES - GENERAL: PAINLEVEL: NO PAIN (0)

## 2023-08-24 NOTE — PATIENT INSTRUCTIONS
Tus pruebas muestran que tienes bajos niveles de adele. [place] podría ser debido a que estás sangrando, posiblemente en algún lugar de tus intestinos, o porque no estás absorbiendo glen el adele. Necesitaremos examinar tu estómago y colon con judith cámara para buscar señales de sangrado. También vamos a extraer bora hoy para frank si estás absorbiendo adele. Mientras tanto, por favor dannielle las pastillas de adele dos veces al día junto con judith tableta de vitamina C. Además, repetiremos tus conteos sanguíneos en un mes para asegurarnos de que estén mejorando.

## 2023-08-24 NOTE — PROGRESS NOTES
Medical Assistant Note:  Jennifer Fallon presents today for blood draw.    Patient seen by provider today: Yes: [place].   present during visit today: Not Applicable.    Concerns: No Concerns.    Procedure:  Lab draw site: left antecub, Needle type: butterfly, Gauge: 23.    Post Assessment:  Labs drawn without difficulty: Yes.    Discharge Plan:  Departure Mode: Ambulatory.    Face to Face Time: 10.    Karmen Maynard

## 2023-08-24 NOTE — LETTER
8/24/2023         RE: Jennifer Fallon  [address]  [address]  [place] [address]        Dear Colleague,    Thank you for referring your patient, Jennifer Fallon, to the [facility]. Please see a copy of my visit note below.    [facility]    Hematology/Oncology New Patient Note      Today's Date: 8/24/23    Reason for Consultation: Anemia  Referring Provider: Keenan Baxter NP      HISTORY OF PRESENT ILLNESS: Jennifer Fallon is a 68 year old female who presents for evaluation of anemia.   Seen accompanied by her  with help of an .      She was seen for a routine clinic visit on 8/16 and noted to be anemic with hemoglobin 7.3 and MCV 64.  Prior Hemoglobin from 2021 demonstrated mild anemia with hemoglobin 10.0 and MCV 78.  Repeat lab on 8/18 demonstrated hemoglobin of 7.0 and she was sent to the ED where she was administered a blood transfusion.    Had noted some mild exertional dyspnea prior to the transfusion which has now resolved.  Denies any appreciable fatigue and has otherwise been feeling well.  Last colonoscopy was about 3 years ago.  Taking oral iron for past week, has taken in the past years ago but discontinued, does not have any symptoms with it.  Does chew ice, has increased over the past few months.  No dyspepsia, abdominal bloating, melena or hematochezia or abdominal pain.  Appetite and energy are good.  Has been told she has been iron deficient in the past.      REVIEW OF SYSTEMS:   A 14 point ROS was reviewed with pertinent positives and negatives in the HPI.        HOME MEDICATIONS:  Current Outpatient Medications   Medication Sig Dispense Refill     atorvastatin (LIPITOR) 20 MG tablet Take 1 tablet (20 mg) by mouth daily 90 tablet 0     blood glucose (ACCU-CHEK ARGENIS PLUS) test strip Use to test blood sugars 1 times daily or as directed. 300 strip 0     ferrous sulfate (FE TABS) 325 (65 Fe) MG EC  "tablet Take 1 tablet (325 mg) by mouth daily 90 tablet 1     levothyroxine (SYNTHROID/LEVOTHROID) 112 MCG tablet Take 1 tablet (112 mcg) by mouth daily 90 tablet 0     vitamin D3 (CHOLECALCIFEROL) 50 mcg (2000 units) tablet Take 2 tablets (100 mcg) by mouth daily 200 tablet 3         ALLERGIES:  No Known Allergies      PAST MEDICAL HISTORY:  Past Medical History:   Diagnosis Date     Abnormal Pap smear of cervix 2012    approx date - pt reports in 10/31/18 visit notes that \"additional testing\" was performed     CARDIOVASCULAR SCREENING; LDL GOAL LESS THAN 130      Constipation      Diabetes (H) diagnosed 2016    controlled with diet only; no meds.     Diarrhea      Hypothyroidism          PAST SURGICAL HISTORY:  Past Surgical History:   Procedure Laterality Date     CHOLECYSTECTOMY  2004     COLONOSCOPY Left 6/23/2017    Procedure: COMBINED COLONOSCOPY, SINGLE OR MULTIPLE BIOPSY/POLYPECTOMY BY BIOPSY;  COLONOSCOPY with polypectomy of colon polyp by cold biopsy  ;  Surgeon: Chang Beasley MD;  Location:  GI     TUBAL LIGATION  1987         SOCIAL HISTORY:  Social History     Socioeconomic History     Marital status:      Spouse name: Not on file     Number of children: Not on file     Years of education: Not on file     Highest education level: Not on file   Occupational History     Not on file   Tobacco Use     Smoking status: Never     Smokeless tobacco: Never   Vaping Use     Vaping Use: Never used   Substance and Sexual Activity     Alcohol use: Yes     Alcohol/week: 0.0 standard drinks of alcohol     Comment: Socially     Drug use: No     Sexual activity: Yes     Partners: Male   Other Topics Concern     Parent/sibling w/ CABG, MI or angioplasty before 65F 55M? Not Asked   Social History Narrative     Not on file     Social Determinants of Health     Financial Resource Strain: Not on file   Food Insecurity: Not on file   Transportation Needs: Not on file   Physical Activity: Not on file   Stress: " "Not on file   Social Connections: Not on file   Intimate Partner Violence: Not on file   Housing Stability: Not on file         FAMILY HISTORY:  Family History   Problem Relation Age of Onset     Diabetes Brother      Diabetes Brother      Coronary Artery Disease Mother      Breast Cancer No family hx of      Colon Cancer No family hx of      Prostate Cancer No family hx of      Other Cancer No family hx of          PHYSICAL EXAM:  Vital signs:  /61   Pulse 62   Temp 98.1  F (36.7  C) (Oral)   Resp 16   Ht 1.499 m (4' 11\")   Wt 92.4 kg (203 lb 9.6 oz)   LMP  (LMP Unknown)   SpO2 100%   BMI 41.12 kg/m     GENERAL/CONSTITUTIONAL: Appears well, no acute distress.  HEENT: Gaze conjugate, pupils equal and round. No scleral icterus, no pallor.  LYMPH: No cervical, supraclavicular, axillary or inguinal adenopathy.   RESPIRATORY: Lungs clear to auscultation bilaterally. No crackles or wheezing.   CARDIOVASCULAR: Regular rate and rhythm without murmurs, gallops, or rubs.    GASTROINTESTINAL: No organomegaly, masses, or tenderness.  No guarding.  No distention.  MUSCULOSKELETAL: No clubbing, cyanosis or edema.  NEUROLOGIC:  Alert and oriented, answers questions appropriately.  No evidence of ataxia or tremor.  Speech fluent.  INTEGUMENTARY: No rashes or jaundice.      LABS:   Latest Reference Range & Units 08/18/23 09:00 08/18/23 09:15 08/18/23 12:38 08/23/23 08:57   Sodium 136 - 145 mmol/L   137    Potassium 3.4 - 5.3 mmol/L   4.3    Chloride 98 - 107 mmol/L   106    Carbon Dioxide (CO2) 22 - 29 mmol/L   21 (L)    Urea Nitrogen 8.0 - 23.0 mg/dL   15.9    Creatinine 0.51 - 0.95 mg/dL   0.58    GFR Estimate >60 mL/min/1.73m2   >90    Calcium 8.8 - 10.2 mg/dL   8.5 (L)    Anion Gap 7 - 15 mmol/L   10    Albumin 3.5 - 5.2 g/dL   4.2    Protein Total 6.4 - 8.3 g/dL   7.3    Alkaline Phosphatase 35 - 104 U/L   100    ALT 0 - 50 U/L   21    AST 0 - 45 U/L   32    Bilirubin Total <=1.2 mg/dL   0.5    Glucose 70 - 99 " mg/dL   136 (H)    Occult Blood Scn FIT Negative  Negative      Troponin T, High Sensitivity <=14 ng/L   8    WBC 4.0 - 11.0 10e3/uL  6.0 6.3 6.5   Hemoglobin 11.7 - 15.7 g/dL  7.0 (L) 7.1 (L) 8.4 (L)   Hematocrit 35.0 - 47.0 %  25.9 (L) 26.0 (L) 30.6 (L)   Platelet Count 150 - 450 10e3/uL  300 302 260   RBC Count 3.80 - 5.20 10e6/uL  4.12 4.12 4.56   MCV 78 - 100 fL  63 (L) 63 (L) 67 (L)   MCH 26.5 - 33.0 pg  17.0 (L) 17.2 (L) 18.4 (L)   MCHC 31.5 - 36.5 g/dL  27.0 (L) 27.3 (L) 27.5 (L)   RDW 10.0 - 15.0 %  20.9 (H) 20.7 (H) 24.8 (H)   % Neutrophils %   66 74   % Lymphocytes %   24 17   % Monocytes %   8 6   % Eosinophils %   1 2   % Basophils %   1 1   Absolute Basophils 0.0 - 0.2 10e3/uL   0.0 0.0   Absolute Eosinophils 0.0 - 0.7 10e3/uL   0.1 0.1   Absolute Immature Granulocytes <=0.4 10e3/uL   0.0 0.0   Absolute Lymphocytes 0.8 - 5.3 10e3/uL   1.5 1.1   Absolute Monocytes 0.0 - 1.3 10e3/uL   0.5 0.4   % Immature Granulocytes %   0 0   Absolute Neutrophils 1.6 - 8.3 10e3/uL   4.2 4.8   Absolute NRBCs 10e3/uL   0.0 0.0   NRBCs per 100 WBC <1 /100   0 0   (L): Data is abnormally low  (H): Data is abnormally high   Latest Reference Range & Units 08/16/23 08:23   Iron 37 - 145 ug/dL 12 (L)   Iron Binding Capacity 240 - 430 ug/dL 453 (H)   Iron Sat Index 15 - 46 % 3 (L)   LDL Cholesterol Calculated <=100 mg/dL 94   (L): Data is abnormally low  (H): Data is abnormally high          ASSESSMENT:  Iron deficiency anemia in a postmenopausal woman.  No GI symptoms but suspicious for occult GI blood loss.  Good response to transfusion and asymptomatic.  She tolerates oral iron well so will increase to 325 mg bid with vitamin C at each dose.  Will also proceed with EGD/colonoscopy and check TTG antibodies to assess for celiac disease.  If no source identified on EGD/colonoscopy then will need small bowel Pillcam study.      PLAN:    Labs today to include CBC, reticulocyte count, TTG IgG and IgA.  EGD/colonoscopy.  Ferrous  "sulfate 325 mg bid with OTC vitamin C.  RTC in one month with CBC.  If not improved (>10) consider IV iron.      Total time is 45 minutes including record review, face to face time, documentation and orders.      Alessio Avila MD  Hematology/Oncology  [facility]     Oncology Rooming Note    August 24, 2023 9:35 AM   Jennifer Fallon is a 68 year old female who presents for:    Chief Complaint   Patient presents with     Oncology Clinic Visit     Initial Vitals: /61   Pulse 62   Temp 98.1  F (36.7  C) (Oral)   Resp 16   Ht 1.499 m (4' 11\")   Wt 92.4 kg (203 lb 9.6 oz)   LMP  (LMP Unknown)   SpO2 100%   BMI 41.12 kg/m   Estimated body mass index is 41.12 kg/m  as calculated from the following:    Height as of this encounter: 1.499 m (4' 11\").    Weight as of this encounter: 92.4 kg (203 lb 9.6 oz). Body surface area is 1.96 meters squared.  No Pain (0) Comment: Data Unavailable   No LMP recorded (lmp unknown). Patient is postmenopausal.  Allergies reviewed: Yes  Medications reviewed: Yes    Medications: Medication refills not needed today.  Pharmacy name entered into [facility]: [id number] - [place], [place] - [address] [place] [address] [address]    Clinical concerns: Syed Maynard              Again, thank you for allowing me to participate in the care of your patient.        Sincerely,        Alessio Avila MD  "

## 2023-08-24 NOTE — PROGRESS NOTES
Per Dr. Avila, based off of the lab results from today, patient does not need to have her labs rechecked on Monday, and okay to cancel infusion appointment on Friday, 9/1/23. Using interpretive services, writer attempted to contact patient to update her of the above; unable to connect with patient, left voice message requesting callback.    Kelin Chauhan RN on 8/24/2023 at 1:56 PM

## 2023-08-24 NOTE — PROGRESS NOTES
"Oncology Rooming Note    August 24, 2023 9:35 AM   Jennifer Fallon is a 68 year old female who presents for:    Chief Complaint   Patient presents with    Oncology Clinic Visit     Initial Vitals: /61   Pulse 62   Temp 98.1  F (36.7  C) (Oral)   Resp 16   Ht 1.499 m (4' 11\")   Wt 92.4 kg (203 lb 9.6 oz)   LMP  (LMP Unknown)   SpO2 100%   BMI 41.12 kg/m   Estimated body mass index is 41.12 kg/m  as calculated from the following:    Height as of this encounter: 1.499 m (4' 11\").    Weight as of this encounter: 92.4 kg (203 lb 9.6 oz). Body surface area is 1.96 meters squared.  No Pain (0) Comment: Data Unavailable   No LMP recorded (lmp unknown). Patient is postmenopausal.  Allergies reviewed: Yes  Medications reviewed: Yes    Medications: Medication refills not needed today.  Pharmacy name entered into [employer]: [id number] - [place], [place] - [address] [place] [address] [address] [facility] [address] [facility]    Clinical concerns: Syed Maynard            "

## 2023-08-25 ENCOUNTER — APPOINTMENT (OUTPATIENT)
Dept: INTERPRETER SERVICES | Facility: CLINIC | Age: 68
End: 2023-08-25
Payer: COMMERCIAL

## 2023-08-25 LAB
TTG IGA SER-ACNC: 0.5 U/ML
TTG IGG SER-ACNC: <0.6 U/ML

## 2023-08-25 NOTE — PROGRESS NOTES
Writer called patient this morning using  services.  Reviewed patient's Hgb results from yesterday.  Hgb 8.6.  Per Dr. Avila, ok to to cancel lab apt on Monday 8/28 and cancel infusion apt on Friday 9/1/23.  Pt was very happy with the good news.  Writer asked patient how she is feeling. She said she feels very good.  Denies chest pain, shortness of breath, dizziness, fatigue.  Writer encouraged patient to call us if she develops any of these symptoms.  Lab apt and infusion apt cancelled since pt is feeling well and ok per Dr. Avila.    Writer also explained to patient that Dr. Avila would like patient to have a colonoscopy and endoscopy.  Pt understood that needs to be done.  Writer offered to connect pt to GI, but she said she will wait for them to contact her to set it up.  Explained the referral is in and she should be contacted soon, but to let us know if she isn't contacted by Monday 8/28/23.    Frances Sesay, RN, BSN    RN Care Coordinator  [facility]  [phone]

## 2023-08-28 ENCOUNTER — TELEPHONE (OUTPATIENT)
Dept: GASTROENTEROLOGY | Facility: CLINIC | Age: 68
End: 2023-08-28

## 2023-08-28 NOTE — PROGRESS NOTES
Writer called GI scheduling team to request a call be completed by their team to scheduled colonoscopy/EGD with patient. Per Shannan, with GI scheduling, she will outreach to patient today to schedule these procedures. Writer will continue to monitor to ensure they are scheduled.     Kelin Chauhan RN on 8/28/2023 at 3:37 PM    ADDENDUM    EGD/Colonoscopy now scheduled for 11/29/23.     Kelin Chauhan RN on 8/28/2023 at 4:24 PM

## 2023-08-28 NOTE — TELEPHONE ENCOUNTER
"Endoscopy Scheduling Screen    Have you had a positive Covid test in the last 14 days?  No    Are you active on MyChart?   No    What insurance is in the chart?  Other:  [employer]    Ordering/Referring Provider: RANJIT AN   (If ordering provider performs procedure, schedule with ordering provider unless otherwise instructed. )    BMI: Estimated body mass index is 41.12 kg/m  as calculated from the following:    Height as of 8/24/23: 1.499 m (4' 11\").    Weight as of 8/24/23: 92.4 kg (203 lb 9.6 oz).     Sedation Ordered  moderate sedation.   If patient BMI > 50 do not schedule in [facility].    Are you taking any prescription medications for pain?   No    Are you taking methadone or Suboxone?  No    Do you have a history of malignant hyperthermia or adverse reaction to anesthesia?  No    (Females) Are you currently pregnant?   No     Have you been diagnosed or told you have pulmonary hypertension?   No    Do you have an LVAD?  No    Have you been told you have moderate to severe sleep apnea?  No    Have you been told you have COPD, asthma, or any other lung disease?  No    Do you have any heart conditions?  No     Have you ever had or are you awaiting a heart or lung transplant?   No    Have you had a stroke or transient ischemic attack (TIA aka \"mini stroke\" in the last 6 months?   No    Have you been diagnosed with or been told you have cirrhosis of the liver?   No    Are you currently on dialysis?   No    Do you need assistance transferring?   No    BMI: Estimated body mass index is 41.12 kg/m  as calculated from the following:    Height as of 8/24/23: 1.499 m (4' 11\").    Weight as of 8/24/23: 92.4 kg (203 lb 9.6 oz).     Is patients BMI > 40 and scheduling location [facility]?  No    Do you take the medication Phentermine, Ozempic or Wegovy?  No    Do you take the medication Naltrexone?  No    Do you take blood thinners?  No      Prep   Are you currently on dialysis or do you have chronic kidney disease?  No    Do " you have a diagnosis of diabetes?  Yes (Golytely Prep)    Do you have a diagnosis of cystic fibrosis (CF)?  No    On a regular basis do you go 3 -5 days between bowel movements?  No    BMI > 40?  Yes (Extended Prep)    Preferred Pharmacy:    [id number] - [place], [place] - [address] [facility] [address]  [address]  [facility] [address]  Phone: [phone] Fax: [phone]      Final Scheduling Details   Colonoscopy prep sent?  Golytely Extended Prep    Procedure scheduled  Colonoscopy / Upper endoscopy (EGD)    Surgeon:  MUNDO     Date of procedure:  11/29/23     Schedule PAC:   No    Location  [facility]    Sedation   Moderate Sedation    Patient Reminders:   You will receive a call from a Nurse to review instructions and health history.  This assessment must be completed prior to your procedure.  Failure to complete the Nurse assessment may result in the procedure being cancelled.      On the day of your procedure, please designate an adult(s) who can drive you home stay with you for the next 24 hours. The medicines used in the exam will make you sleepy. You will not be able to drive.      You cannot take public transportation, ride share services, or non-medical taxi service without a responsible caregiver.  Medical transport services are allowed with the requirement that a responsible caregiver will receive you at your destination.  We require that drivers and caregivers are confirmed prior to your procedure.

## 2023-09-05 ENCOUNTER — HOSPITAL ENCOUNTER (OUTPATIENT)
Dept: GENERAL RADIOLOGY | Facility: CLINIC | Age: 68
Discharge: HOME | End: 2023-09-05
Payer: COMMERCIAL

## 2023-09-05 DIAGNOSIS — R07.9 CHEST PAIN, UNSPECIFIED TYPE: ICD-10-CM

## 2023-09-05 PROCEDURE — 71046 X-RAY EXAM CHEST 2 VIEWS: CPT

## 2023-09-05 NOTE — LETTER
September 6, 2023      Jennifer Fallon  [address]    [facility] [address]        Dear Ms.Perez Fallon,    We are writing to inform you of your test results.    Your chest Xray is normal.    Resulted Orders   XR Chest 2 Views    Narrative    XR CHEST 2 VIEWS 9/5/2023 8:37 AM    HISTORY: Chest pain, unspecified type    COMPARISON: CT 11/8/2020      Impression    IMPRESSION: No acute findings. The lungs are clear and there are no  pleural effusions. Normal heart size.    MAO CEE MD         SYSTEM ID:  [id number]       If you have any questions or concerns, please call the clinic at the number listed above.       Sincerely,      Keenan Bxater NP

## 2023-09-07 ENCOUNTER — HOSPITAL ENCOUNTER (OUTPATIENT)
Dept: MAMMOGRAPHY | Facility: CLINIC | Age: 68
Discharge: HOME | End: 2023-09-07
Payer: COMMERCIAL

## 2023-09-07 DIAGNOSIS — Z12.31 VISIT FOR SCREENING MAMMOGRAM: ICD-10-CM

## 2023-09-07 PROCEDURE — 77067 SCR MAMMO BI INCL CAD: CPT

## 2023-09-08 ENCOUNTER — OFFICE VISIT (OUTPATIENT)
Dept: INTERNAL MEDICINE | Facility: CLINIC | Age: 68
End: 2023-09-08
Payer: COMMERCIAL

## 2023-09-08 ENCOUNTER — ALLIED HEALTH/NURSE VISIT (OUTPATIENT)
Dept: INTERNAL MEDICINE | Facility: CLINIC | Age: 68
End: 2023-09-08
Payer: COMMERCIAL

## 2023-09-08 VITALS
BODY MASS INDEX: 41.41 KG/M2 | WEIGHT: 205.4 LBS | HEIGHT: 59 IN | RESPIRATION RATE: 16 BRPM | TEMPERATURE: 98.5 F | HEART RATE: 88 BPM | OXYGEN SATURATION: 97 % | SYSTOLIC BLOOD PRESSURE: 133 MMHG | DIASTOLIC BLOOD PRESSURE: 65 MMHG

## 2023-09-08 DIAGNOSIS — E66.01 MORBID OBESITY (H): ICD-10-CM

## 2023-09-08 DIAGNOSIS — D50.9 IRON DEFICIENCY ANEMIA, UNSPECIFIED IRON DEFICIENCY ANEMIA TYPE: Primary | ICD-10-CM

## 2023-09-08 DIAGNOSIS — R73.03 PREDIABETES: ICD-10-CM

## 2023-09-08 DIAGNOSIS — Z23 NEED FOR PROPHYLACTIC VACCINATION AND INOCULATION AGAINST INFLUENZA: Primary | ICD-10-CM

## 2023-09-08 PROCEDURE — 99207 PR NO CHARGE NURSE ONLY: CPT

## 2023-09-08 PROCEDURE — 90662 IIV NO PRSV INCREASED AG IM: CPT

## 2023-09-08 PROCEDURE — 90471 IMMUNIZATION ADMIN: CPT

## 2023-09-08 PROCEDURE — 99213 OFFICE O/P EST LOW 20 MIN: CPT

## 2023-09-08 NOTE — PROGRESS NOTES
"  Assessment & Plan     Iron deficiency anemia, unspecified iron deficiency anemia type  Patient following up after ER visit for anemia for which she received a blood transfusion.  She has been following with hematology/oncology has appointment scheduled with them 9/20.  Her hemoglobin and recent lab checks has been stable.  Continue to follow with heme/onc    Prediabetes  Discussed with patient that low sugar/low-carb diet and exercise will be beneficial to make sure this does not develop diabetes     Morbid obesity (H)  noted with prediabetes     BMI:   Estimated body mass index is 41.49 kg/m  as calculated from the following:    Height as of this encounter: 1.499 m (4' 11\").    Weight as of this encounter: 93.2 kg (205 lb 6.4 oz).       Keenan Baxter NP  [facility] ANAYA Rodriguez is a 68 year old, presenting for the following health issues:  RECHECK ([facility] ER follow up.)      9/8/2023     3:17 PM   Additional Questions   Roomed by Kristy Rodriguez MA   Accompanied by Her  & Phone        HPI     ED/ Followup:    Facility:  [facility]  Date of visit: 8/18/23  Reason for visit: Iron deficiency anemia, symptomatic anemia  Current Status: She is doing fine      Review of Systems   Constitutional, HEENT, cardiovascular, pulmonary, GI, , musculoskeletal, neuro, skin, endocrine and psych systems are negative, except as otherwise noted.      Objective    /65 (BP Location: Left arm, Patient Position: Sitting, Cuff Size: Adult Large)   Pulse 88   Temp 98.5  F (36.9  C) (Oral)   Resp 16   Ht 1.499 m (4' 11\")   Wt 93.2 kg (205 lb 6.4 oz)   LMP  (LMP Unknown)   SpO2 97%   BMI 41.49 kg/m    Body mass index is 41.49 kg/m .  Physical Exam   GENERAL: alert and no distress  EYES: Eyes grossly normal to inspection  NECK: no adenopathy, no asymmetry, masses, or scars and thyroid normal to palpation  RESP: lungs clear to auscultation - " no rales, rhonchi or wheezes  CV: regular rate and rhythm, normal S1 S2, no S3 or S4, no murmur, click or rub, no peripheral edema and peripheral pulses strong  MS: no gross musculoskeletal defects noted, no edema  SKIN: no suspicious lesions or rashes  NEURO: Normal strength and tone, mentation intact and speech normal  PSYCH: mentation appears normal, affect normal/bright

## 2023-09-19 NOTE — PROGRESS NOTES
Oncology/Hematology Visit Note    Sep 20, 2023    Reason for visit: Follow-up iron deficiency anemia    Oncology HPI: Jennifer Fallon is a 68 year old female with iron deficiency anemia.  She has a history of this and had previously been on iron, but then discontinued. She was seen for a routine clinic visit on 8/16/23 and noted to be anemic with hemoglobin 7.3 and MCV 64.  Prior Hemoglobin from 2021 with mild anemia with hemoglobin 10.0 and MCV 78.  Repeat lab on 8/18 with hemoglobin of 7.0 and she was sent to the ED and received a blood transfusion.  Colonoscopy 3 years ago was negative.  She had some exertional dyspnea, but improved with blood transfusion.  She chews ice chips.     She was seen by Dr. Avila and recommended restarting oral iron.  She is here today for repeat labs and close follow-up.     Interval History: Jennifer is here with her Earle pulido.   initially on the phone, then in person. She has been taking iron twice daily, but if she forgets, that she takes 2 tablets at once.  She has noticed significant improvement in her BUENO and fatigue.  She has had upper abdominal pain for years, but there is no change with taking iron and she is not having constipation.  She is no longer eating ice cubes.  She has no chest pain or shortness of breath or bleeding.  She is scheduled for EGD/colonoscopy on 11/29/2023.  No other new complaints.    Review of Systems: See interval hx. Denies fevers, chills, cough, CP, SOB, N/V, diarrhea, changes in urination, bleeding, bruising.     PMHx and Social Hx reviewed per EPIC.      Medications:  Current Outpatient Medications   Medication Sig Dispense Refill    atorvastatin (LIPITOR) 20 MG tablet Take 1 tablet (20 mg) by mouth daily 90 tablet 0    ferrous sulfate (FE TABS) 325 (65 Fe) MG EC tablet Take 1 tablet (325 mg) by mouth daily (Patient taking differently: Take 325 mg by mouth daily 2 tablets daily) 90 tablet 1    levothyroxine (SYNTHROID/LEVOTHROID)  "112 MCG tablet Take 1 tablet (112 mcg) by mouth daily 90 tablet 0    vitamin D3 (CHOLECALCIFEROL) 50 mcg (2000 units) tablet Take 2 tablets (100 mcg) by mouth daily 200 tablet 3       No Known Allergies      EXAM:    /70 (BP Location: Right arm, Patient Position: Sitting, Cuff Size: Adult Regular)   Pulse 61   Temp 97.7  F (36.5  C) (Oral)   Resp 16   Ht 1.499 m (4' 11.02\")   Wt 91.9 kg (202 lb 9.6 oz)   LMP  (LMP Unknown)   SpO2 97%   BMI 40.90 kg/m      GENERAL:  Female, in no acute distress.  Alert and oriented x3.   HEENT:  Normocephalic, atraumatic.    LUNGS: Nonlabored breathing  ABDOMEN:  Soft, minimal epigastric tenderness.  EXTREMITIES:  No clubbing, cyanosis, or edema.   SKIN: No rash  PSYCH: Calm and cooperative       Labs:    09/20/23 07:53   WBC 7.5   Hemoglobin 11.2 (L)   Hematocrit 38.2   Platelet Count 229   RBC Count 4.94   MCV 77 (L)   MCH 22.7 (L)   MCHC 29.3 (L)   RDW See Comment   % Neutrophils 68   % Lymphocytes 22   % Monocytes 7   % Eosinophils 2   % Basophils 1   Absolute Basophils 0.0   Absolute Eosinophils 0.2   Absolute Immature Granulocytes 0.0   Absolute Lymphocytes 1.6   Absolute Monocytes 0.5   % Immature Granulocytes 0   Absolute Neutrophils 5.1   Absolute NRBCs 0.0   NRBCs per 100 WBC 0   RBC Morphology Confirmed RBC Indices   Platelet Morphology Automated Count Confirmed. Platelet morphology is normal.   Elliptocytes Slight !     Imaging: n/a    Impression/Plan: Jennifer Fallon is a 68 year old female with CAROLYN, currently on po iron.     CAROLYN: Labs consistent with CAROLYN on 8/16/2023, she has been consistent with iron twice daily.  Hemoglobin is significantly improved today to 11.2 and she feels much better.  She is no longer chewing ice cubes and her BUENO/fatigue is much improved.  She will continue this regimen we reviewed orange juice as well.  Colonoscopy is scheduled in November 2023.  I will see her back in 3 months with repeat labs.    BUENO: Secondary to CAROLYN, " hemoglobin improving, now resolved.    Abdominal pain: Chronic for years, minimally tender epigastric, no changes recently.  She eats a lot of spicy foods and drinks caffeine.  We discussed some dietary changes to see if this helps.  She may benefit from PPI/H2 blocker, but will hold off for now.      Chart documentation with Dragon Voice recognition Software. Although reviewed after completion, some words and grammatical errors may remain.    40 minutes spent on the date of the encounter doing chart review, review of test results, interpretation of tests, patient visit, documentation, discussion with other provider(s), and discussion with family     Michell Fairchild PA-C  Hematology/Oncology  [facility]

## 2023-09-20 ENCOUNTER — ONCOLOGY VISIT (OUTPATIENT)
Dept: ONCOLOGY | Facility: CLINIC | Age: 68
End: 2023-09-20
Attending: INTERNAL MEDICINE
Payer: COMMERCIAL

## 2023-09-20 ENCOUNTER — LAB (OUTPATIENT)
Dept: ONCOLOGY | Facility: CLINIC | Age: 68
End: 2023-09-20
Attending: PHYSICIAN ASSISTANT
Payer: COMMERCIAL

## 2023-09-20 VITALS
HEIGHT: 59 IN | DIASTOLIC BLOOD PRESSURE: 70 MMHG | BODY MASS INDEX: 40.84 KG/M2 | TEMPERATURE: 97.7 F | SYSTOLIC BLOOD PRESSURE: 127 MMHG | WEIGHT: 202.6 LBS | HEART RATE: 61 BPM | RESPIRATION RATE: 16 BRPM | OXYGEN SATURATION: 97 %

## 2023-09-20 DIAGNOSIS — R10.13 ABDOMINAL PAIN, EPIGASTRIC: ICD-10-CM

## 2023-09-20 DIAGNOSIS — D50.9 IRON DEFICIENCY ANEMIA, UNSPECIFIED IRON DEFICIENCY ANEMIA TYPE: ICD-10-CM

## 2023-09-20 DIAGNOSIS — D50.9 IRON DEFICIENCY ANEMIA, UNSPECIFIED IRON DEFICIENCY ANEMIA TYPE: Primary | ICD-10-CM

## 2023-09-20 LAB
BASOPHILS # BLD AUTO: 0 10E3/UL (ref 0–0.2)
BASOPHILS NFR BLD AUTO: 1 %
ELLIPTOCYTES BLD QL SMEAR: SLIGHT
EOSINOPHIL # BLD AUTO: 0.2 10E3/UL (ref 0–0.7)
EOSINOPHIL NFR BLD AUTO: 2 %
ERYTHROCYTE [DISTWIDTH] IN BLOOD BY AUTOMATED COUNT: ABNORMAL %
HCT VFR BLD AUTO: 38.2 % (ref 35–47)
HGB BLD-MCNC: 11.2 G/DL (ref 11.7–15.7)
IMM GRANULOCYTES # BLD: 0 10E3/UL
IMM GRANULOCYTES NFR BLD: 0 %
LYMPHOCYTES # BLD AUTO: 1.6 10E3/UL (ref 0.8–5.3)
LYMPHOCYTES NFR BLD AUTO: 22 %
MCH RBC QN AUTO: 22.7 PG (ref 26.5–33)
MCHC RBC AUTO-ENTMCNC: 29.3 G/DL (ref 31.5–36.5)
MCV RBC AUTO: 77 FL (ref 78–100)
MONOCYTES # BLD AUTO: 0.5 10E3/UL (ref 0–1.3)
MONOCYTES NFR BLD AUTO: 7 %
NEUTROPHILS # BLD AUTO: 5.1 10E3/UL (ref 1.6–8.3)
NEUTROPHILS NFR BLD AUTO: 68 %
NRBC # BLD AUTO: 0 10E3/UL
NRBC BLD AUTO-RTO: 0 /100
PLAT MORPH BLD: ABNORMAL
PLATELET # BLD AUTO: 229 10E3/UL (ref 150–450)
RBC # BLD AUTO: 4.94 10E6/UL (ref 3.8–5.2)
RBC MORPH BLD: ABNORMAL
WBC # BLD AUTO: 7.5 10E3/UL (ref 4–11)

## 2023-09-20 PROCEDURE — 36415 COLL VENOUS BLD VENIPUNCTURE: CPT

## 2023-09-20 PROCEDURE — 99215 OFFICE O/P EST HI 40 MIN: CPT | Performed by: PHYSICIAN ASSISTANT

## 2023-09-20 PROCEDURE — 85025 COMPLETE CBC W/AUTO DIFF WBC: CPT | Performed by: INTERNAL MEDICINE

## 2023-09-20 PROCEDURE — G0463 HOSPITAL OUTPT CLINIC VISIT: HCPCS | Performed by: PHYSICIAN ASSISTANT

## 2023-09-20 ASSESSMENT — PAIN SCALES - GENERAL: PAINLEVEL: NO PAIN (0)

## 2023-09-20 NOTE — LETTER
9/20/2023         RE: Jennifer Fallon  [address]  [address]  [facility] [address]        Dear Colleague,    Thank you for referring your patient, Jennifer Fallon, to the [facility]. Please see a copy of my visit note below.    Oncology/Hematology Visit Note    Sep 20, 2023    Reason for visit: Follow-up iron deficiency anemia    Oncology HPI: Jennifer Fallon is a 68 year old female with iron deficiency anemia.  She has a history of this and had previously been on iron, but then discontinued. She was seen for a routine clinic visit on 8/16/23 and noted to be anemic with hemoglobin 7.3 and MCV 64.  Prior Hemoglobin from 2021 with mild anemia with hemoglobin 10.0 and MCV 78.  Repeat lab on 8/18 with hemoglobin of 7.0 and she was sent to the ED and received a blood transfusion.  Colonoscopy 3 years ago was negative.  She had some exertional dyspnea, but improved with blood transfusion.  She chews ice chips.     She was seen by Dr. Avila and recommended restarting oral iron.  She is here today for repeat labs and close follow-up.     Interval History: Jennifer is here with her Seun pulido   initially on the phone, then in person. She has been taking iron twice daily, but if she forgets, that she takes 2 tablets at once.  She has noticed significant improvement in her BUENO and fatigue.  She has had upper abdominal pain for years, but there is no change with taking iron and she is not having constipation.  She is no longer eating ice cubes.  She has no chest pain or shortness of breath or bleeding.  She is scheduled for EGD/colonoscopy on 11/29/2023.  No other new complaints.    Review of Systems: See interval hx. Denies fevers, chills, cough, CP, SOB, N/V, diarrhea, changes in urination, bleeding, bruising.     PMHx and Social Hx reviewed per EPIC.      Medications:  Current Outpatient Medications   Medication Sig Dispense Refill     atorvastatin  "(LIPITOR) 20 MG tablet Take 1 tablet (20 mg) by mouth daily 90 tablet 0     ferrous sulfate (FE TABS) 325 (65 Fe) MG EC tablet Take 1 tablet (325 mg) by mouth daily (Patient taking differently: Take 325 mg by mouth daily 2 tablets daily) 90 tablet 1     levothyroxine (SYNTHROID/LEVOTHROID) 112 MCG tablet Take 1 tablet (112 mcg) by mouth daily 90 tablet 0     vitamin D3 (CHOLECALCIFEROL) 50 mcg (2000 units) tablet Take 2 tablets (100 mcg) by mouth daily 200 tablet 3       No Known Allergies      EXAM:    /70 (BP Location: Right arm, Patient Position: Sitting, Cuff Size: Adult Regular)   Pulse 61   Temp 97.7  F (36.5  C) (Oral)   Resp 16   Ht 1.499 m (4' 11.02\")   Wt 91.9 kg (202 lb 9.6 oz)   LMP  (LMP Unknown)   SpO2 97%   BMI 40.90 kg/m      GENERAL:  Female, in no acute distress.  Alert and oriented x3.   HEENT:  Normocephalic, atraumatic.    LUNGS: Nonlabored breathing  ABDOMEN:  Soft, minimal epigastric tenderness.  EXTREMITIES:  No clubbing, cyanosis, or edema.   SKIN: No rash  PSYCH: Calm and cooperative       Labs:    09/20/23 07:53   WBC 7.5   Hemoglobin 11.2 (L)   Hematocrit 38.2   Platelet Count 229   RBC Count 4.94   MCV 77 (L)   MCH 22.7 (L)   MCHC 29.3 (L)   RDW See Comment   % Neutrophils 68   % Lymphocytes 22   % Monocytes 7   % Eosinophils 2   % Basophils 1   Absolute Basophils 0.0   Absolute Eosinophils 0.2   Absolute Immature Granulocytes 0.0   Absolute Lymphocytes 1.6   Absolute Monocytes 0.5   % Immature Granulocytes 0   Absolute Neutrophils 5.1   Absolute NRBCs 0.0   NRBCs per 100 WBC 0   RBC Morphology Confirmed RBC Indices   Platelet Morphology Automated Count Confirmed. Platelet morphology is normal.   Elliptocytes Slight !     Imaging: n/a    Impression/Plan: Jennifer Fallon is a 68 year old female with CAROLYN, currently on po iron.     CAROLYN: Labs consistent with CAROLYN on 8/16/2023, she has been consistent with iron twice daily.  Hemoglobin is significantly improved today to 11.2 " and she feels much better.  She is no longer chewing ice cubes and her BUENO/fatigue is much improved.  She will continue this regimen we reviewed orange juice as well.  Colonoscopy is scheduled in November 2023.  I will see her back in 3 months with repeat labs.    BUENO: Secondary to CAROLYN, hemoglobin improving, now resolved.    Abdominal pain: Chronic for years, minimally tender epigastric, no changes recently.  She eats a lot of spicy foods and drinks caffeine.  We discussed some dietary changes to see if this helps.  She may benefit from PPI/H2 blocker, but will hold off for now.      Chart documentation with Dragon Voice recognition Software. Although reviewed after completion, some words and grammatical errors may remain.    40 minutes spent on the date of the encounter doing chart review, review of test results, interpretation of tests, patient visit, documentation, discussion with other provider(s), and discussion with family     Michell Fairchild PA-C  Hematology/Oncology  [facility]                            Again, thank you for allowing me to participate in the care of your patient.        Sincerely,        Michell Fairchild PA-C

## 2023-09-20 NOTE — PROGRESS NOTES
Medical Assistant Note:  Jennifer Fallon presents today for blood draw.    Patient seen by provider today: Yes: Michell Fairchild.   present during visit today: Not Applicable.    Concerns: No Concerns.    Procedure:  Lab draw site: [facility] antecub, Needle type: butterfly, Gauge: 23.    Post Assessment:  Labs drawn without difficulty: Yes.    Discharge Plan:  Departure Mode: Ambulatory.    Face to Face Time: 10.    Michelle Gordon, CMA

## 2023-09-20 NOTE — NURSING NOTE
"Oncology Rooming Note    September 20, 2023 8:07 AM   Jennifer Fallon is a 68 year old female who presents for:    Chief Complaint   Patient presents with    Oncology Clinic Visit     Iron def anemia      Initial Vitals: /70 (BP Location: Right arm, Patient Position: Sitting, Cuff Size: Adult Regular)   Pulse 61   Temp 97.7  F (36.5  C) (Oral)   Resp 16   Ht 1.499 m (4' 11.02\")   Wt 91.9 kg (202 lb 9.6 oz)   LMP  (LMP Unknown)   SpO2 97%   BMI 40.90 kg/m   Estimated body mass index is 40.9 kg/m  as calculated from the following:    Height as of this encounter: 1.499 m (4' 11.02\").    Weight as of this encounter: 91.9 kg (202 lb 9.6 oz). Body surface area is 1.96 meters squared.  No Pain (0) Comment: Data Unavailable   No LMP recorded (lmp unknown). Patient is postmenopausal.  Allergies reviewed: Yes  Medications reviewed: Yes    Medications: Medication refills not needed today.  Pharmacy name entered into [employer]: [facility] #[id number] - [place], [place] - [address] [place] [address] [address] [facility] [address]    Clinical concerns: Hospital follow up      Collette Lira CMA              "

## 2023-10-20 DIAGNOSIS — E03.9 HYPOTHYROIDISM, UNSPECIFIED TYPE: ICD-10-CM

## 2023-10-22 RX ORDER — LEVOTHYROXINE SODIUM 112 UG/1
112 TABLET ORAL DAILY
Qty: 90 TABLET | Refills: 3 | Status: SHIPPED | OUTPATIENT
Start: 2023-10-22

## 2023-11-10 ENCOUNTER — TELEPHONE (OUTPATIENT)
Dept: GASTROENTEROLOGY | Facility: CLINIC | Age: 68
End: 2023-11-10
Payer: COMMERCIAL

## 2023-11-10 DIAGNOSIS — D50.9 ANEMIA, IRON DEFICIENCY: Primary | ICD-10-CM

## 2023-11-10 RX ORDER — BISACODYL 5 MG/1
TABLET, DELAYED RELEASE ORAL
Qty: 4 TABLET | Refills: 0 | Status: SHIPPED | OUTPATIENT
Start: 2023-11-10 | End: 2023-12-18

## 2023-11-10 NOTE — LETTER
November 22, 2023      Jennifer Fallon  [address]    [facility] [address]              Colonoscopy/Upper endoscopy (EGD)     Procedure date: 11/29/23    Anticipated arrival time: 8:00 AM   (Procedure Times are Subject to Change)    Facility location: [facility]; [address] [place] [address] - [facility] Parking information: Self pay parking available in [facility]. The [facility] is  located across [facility] from the hospital and is connected to the  hospital by a skyway. The skyway access is on Level C of the parking ramp.   parking is available Monday through Friday from 7 a.m.-3 p.m.  parking attendants are stationed at Door 2 at the [facility] entrance,  located off of [address]      Important Procedure Reminders:     Prep Instructions:   Instructions on how to prepare for your upcoming procedure are found below. Please read instructions carefully. Deviation from instructions may result in less than desired outcomes and procedure may need to be rescheduled. If you have additional questions regarding how to prepare for your upcoming procedure, please contact our endoscopy pre assessment nurses at [phone] option 4.      Policy:   On the day of your procedure, please designatean adult(s) who can drive you home stay with you for the next 24 hours. The medicines used in the exam will make you sleepy. You will not be able to drive. You cannot take public transportation, ride share services, or non-medical taxi service without a responsible caregiver.  Medical transport services are allowed with the requirement that a responsible caregiver will receive you at your destination.  We require that drivers and caregivers are confirmed prior to your procedure.    Day of procedure:  Please do not wear jewelry (i.e. earrings, rings, necklaces, watches, etc) . Leave your purse, billfold, credit cards, and other valuables at  home.   Bring insurance card and ID.     To cancel or reschedule your procedure:   Please call our endoscopy scheduling team at: [facility]: [phone], option 2. Monday through Friday, 7:00am-5:00pm.      Medication Reminders:    Please note the following medication holding recommendations:   Ferrous Sulfate (iron supplement): HOLD 7 days before procedure.    ----------------------------------------------------------------------------------------------------------------------------------------------------------------------------------------------------------------------------------------------------------------------      Your colonoscopy prep prescription has been sent to [id number] - [place], [place] - [address] [place] [address] [address] Extended Colonoscopy Prep  Prep instructions for colonoscopy   Pre-Assessment Phone Number: [facility]; [phone]      Please read these instructions carefully at least 7 days prior to your colonoscopy procedure. Be sure to follow all directions completely. The inside of your colon must be clean to allow for a complete examination for the presence of any growths, polyps, and/or abnormalities, as well as their biopsy or removal. A number of tips are included in order to make this part of the procedure as comfortable as possible.    Immediately:  You will receive a call from a Nurse to review instructions and health history.  This assessment must be completed prior to your procedure.  Failure to complete the Nurse assessment may result in the procedure being cancelled.  You must arrange for an adult to drive you home after your exam. Your colonoscopy cannot be done unless you have a ride. If you need to use public transportation, someone must ride with you and stay with you for a minimum of 6-24 hours.  Check with your insurance company to be sure they will cover this exam.Arrange for a responsible adult  to drive you home on the day of the exam. This cannot be a taxi or a bus as you will need someone with you after the procedure.    7 days prior:  Talk to your prescribing provider: If you take blood thinners (such as Coumadin, Plavix, Xarelto, Eliquis, Lovenox, or others), these medications may need to be stopped temporarily before your procedure. Your prescribing provider will tell you what to do.   Talk to your prescribing provider: If you take prescription NSAIDS (such as Sulindac, Celebrex, Mobic, Relafen). Your prescribing provider will tell you what to do.   If you have diabetes, you should request an early morning appointment.  Stop taking fiber supplements and multivitamins containing iron, or any other medications containing iron.  Fill your prescription for 2 containers of Golytely and 4 Dulcolax tablets at the pharmacy.  It is very important that you stay well hydrated during the colonoscopy prep. The Golytely bowel prep is designed to clean out your colon, but it will not provide hydration. While you are taking the prescribed prep, you should also drink 64 oz. of Gatorade or similar sports drink product to drink for staying hydrated. (Avoid red and purple colors)  Stop eating corn, popcorn, nuts and foods with seeds.   Begin a restricted or low fiber diet (see list below).    2 days prior:  Drink fluids to be well hydrated, this is important. Drink at least 4 large glasses of water, Gatorade, or other similar sports drinks.  It is a good idea to use Vaseline on the skin around your anus after each bowel movement to prevent irritation. Wet wipes also help to reduce irritation.   You can have a light, low-fiber breakfast. You may also have a light, low-fiber lunch.  No solid foods after 1pm. Begin a clear liquid diet. (see list below).  At 4pm, take 2 Dulcolax (bisacodyl) tablets.  At 5pm, mix and drink half of a jug of Golytely bowel prep. Drink an 8 oz. Glass of Golytely every 10-15 minutes until half of  the jug is gone. Place the remainder of the Golytely in the refrigerator. Stay close to the bathroom.     1 day prior:  Continue clear liquid diet only (see examples below). Do not eat solid food this day.  Do not drink any red or purple colored drinks.  Stop taking NSAID pain relievers, such as Advil, Ibuprofen, Motrin, etc.  You may take Tylenol.  At 4 pm, take 2 Dulcolax (bisacodyl) tablets.  At 5 pm, drink the 2nd half of a jug of Golytely bowel prep. Drink an 8 oz. glass of Golytely every 10-15 minutes until the 1st jug of Golytely is gone.   The Golytely bowel prep will not keep you hydrated. You should drink 8-10 glasses of clear liquids throughout the day.  Before you go to bed, mix the 2nd container of Golytely and place in the refrigerator for the morning.      Procedure day:  6 hours before your check-in time, drink an 8 oz. Glass of Golytely every 10-15 minutes until half of the 2nd jug of Golytely is gone. You WILL NOT drink the entire 2nd jug of Golytely.   You may take your necessary morning medications with sips of water  Do not take diabetes medicine by mouth until after your exam.  You may drink clear liquids only up until 2 hours before your arrival time.  Do not smoke, chew tobacco, or swallow anything, including water or gum for at least 2 hours before your arrival time. This is a safety issue. Your procedure could be cancelled if you do not follow directions.  Please do not wear jewelry (i.e. earrings, rings, necklaces, watches, etc) . Leave your purse, billfold, credit cards, and other valuables at home.   Please arrive with a responsible adult who can take you home after the test and stay with you for a minimum of 24 hours: The medicine used will make you sleepy and forgetful. If you do not have someone to take you home, we will cancel your procedure. If using public transportation you must have someone to ride with you.  Please perform your nebulizer treatments and airway clearance therapy  in the morning prior to the procedure (if applicable).  If you have asthma, bring your inhalers.    CLEAR LIQUID DIET   You may have:  Water, tea, coffee (no milk or cream)  Soda pop, Gatorade (not red or purple)  Jell-O, Popsicles (no milk or fruit pieces - not red or purple)  Fat-free soup broth or bouillon  Plain hard candy, such as clear life savers (not red or purple)  Clear juices and fruit-flavored drinks, such as apple juice, white grape juice, Hi-C, and Taye-Aid (not red or purple)   Do not have:  Milk or milk products such as ice cream, malts or shakes, or coffee creamer  Red or purple drinks of any kind such as cranberry juice or grape juice. Avoid red or purple Jell-O, Popsicles, Taye-Aid, sorbet, sherbet and candy  Juices with pulp such as orange, grapefruit, pineapple or tomato juice  Cream soups of any kind  Alcohol and beer  Protein drinks or protein powder     LOW FIBER DIET   You may have:    Starches: White bread, rolls, biscuits, croissants, Garland toast, white flour tortillas, waffles, pancakes, Greek toast; white rice, noodles, pasta, macaroni; cooked and peeled potatoes; plain crackers, saltines; cooked farina or cream of rice; puffed rice, corn flakes, Rice Krispies, Special K   Vegetables: tender cooked and canned, vegetable broths  Fruits and fruit juices: Strained fruit juice, canned fruit without seeds or skin (not pineapple), applesauce, pear sauce, ripe bananas, melons (not watermelon)   Milk products: Milk (plain or flavored), cheese, cottage cheese, yogurt (no berries), custard, ice cream    Proteins: Tender, well-cooked ground beef, lamb, veal, ham, pork, chicken, turkey, fish or organ meats; eggs; creamy peanut butter   Fats and condiments:  Margarine, butter, oils, mayonnaise, sour cream, salad dressing, plain gravy; spices, cooked herbs; sugar, clear jelly, honey, syrup   Snacks, sweets and drinks: Pretzels, hard candy; plain cakes and cookies (no nuts or seeds); gelatin, plain  pudding, sherbet, Popsicles; coffee, tea, carbonated ( fizzy ) drinks Do not have:    Starches: Breads or rolls that contain nuts, seeds or fruit; whole wheat or whole grain breads that contain more than 1 gram of fiber per slice; cornbread; corn or whole wheat tortillas; potatoes with skin; brown rice, wild rice, kasha (buckwheat), and oatmeal   Vegetables: Any raw or steamed vegetables; vegetables with seeds; corn in any form   Fruits and fruit juices: Prunes, prune juice, raisins and other dried fruits, berries and other fruits with seeds, canned pineapple juices with pulp such as orange, grapefruit, pineapple or tomato juice  Milk products: Any yogurt with nuts, seeds or berries   Proteins: Tough, fibrous meats with gristle; cooked dried beans, peas or lentils; crunchy peanut butter  Fats and condiments: Pickles, olives, relish, horseradish; jam, marmalade, preserves   Snacks, sweets and drinks: Popcorn, nuts, seeds, granola, coconut, candies made with nuts or seeds; all desserts that contain nuts, seeds, raisins and other dried fruits, coconut, whole grains or bran.      FAQ:    How do you know if your colon is cleaned out?   After completing the bowel prep, your bowel movements should be all liquid and yellow. Your bowel movements will look similar to urine in the toilet. If there are pieces of stool (poop) in the toilet, or if you can't see to the bottom of the toilet, please call our office for advice. Call [phone] and ask to speak with a nurse.   Why do you need a responsible  to take you home and stay with you?  We require a responsible adult to take you home for your safety. The sedation medicines used to relax you during the procedure can impair your judgement and reaction time, make you forgetful and possible a little unsteady. Do not drive, make any important decisions, or sign any legal documents for 24 hours after your procedure.   It is normal to feel bloated and gassy after your  procedure. Walking will help move the air through your colon. You can take non-aspirin pain relievers that contain acetaminophen (Tylenol).   When can you eat after your procedure?  You may resume your normal diet when you feel ready, unless advised otherwise by the doctor performing your procedure. Do not drink alcohol for 24 hours after your procedure.   You many resume normal activities (work, exercise, etc.) after 24 hours.   When will you get test results?  You should have your procedure results and any lab results (if applicable) by letter, [employer] message, or phone call within 2 weeks. If you have any questions, please call the doctor that referred you for the procedure.       Thank you for choosing  [facility], for your procedure. If you are sent a survey regarding your care, please take the time to complete the questionnaire. We value your feedback!

## 2023-11-10 NOTE — LETTER
November 14, 2023      Jennifer Fallon  [address]    [facility] [address]              Jennifer,       We apologize that we have been unable to contact you by phone. We are calling in regards to your upcoming Colonoscopy/Upper endoscopy (EGD) procedure that is scheduled on 11/29/23 to complete pre assessment.    Please contact our pre assessment nursing team at [phone] option 4. We are open Monday through Friday, 7:00am to 5:00pm. Note that failure to complete Nurse assessment phone call will result in your procedure being cancelled.     Our schedules fill up quickly and are in high demand. If you know that you need to cancel, please contact us so that we can accommodate scheduling for other patients. Our endoscopy scheduling team can be reached at [phone] option 2.       Thank you,  [facility] Endoscopy Team

## 2023-11-10 NOTE — TELEPHONE ENCOUNTER
Needs .    Pre visit planning completed.      Procedure details:    Patient scheduled for Colonoscopy/Upper endoscopy (EGD) on 11/29/23.     Arrival time: 0800. Procedure time 0845    Pre op exam needed? N/A    Facility location: [facility]; [address] [place] [address] [place] [address]    Sedation type: Conscious sedation     Indication for procedure: Iron deficiency anemia      Chart review:     Electronic implanted devices? No    Recent diagnosis of diverticulitis within the last 6 weeks? No    Diabetic? Yes. Not on diabetic medication    Diabetic medication HOLDING recommendations: (if applicable)  Oral diabetic medications: No  Diabetic injectables: No  Insulin: No      Medication review:    Anticoagulants? No    NSAIDS? No NSAID medications per patient's medication list.  RN will verify with pre-assessment call.    Other medication HOLDING recommendations:  Iron supplements: HOLD 7 days before procedure.      Prep for procedure:     Bowel prep recommendation: Extended prep Golytely    Due to:  BMI > 40.     Prep instructions sent via letter. Sent both Portuguese translated and English prep instructions.    Bowel prep script sent to   [facility][id number] [address] [place] [address]        Valery Jacobs RN  Endoscopy Procedure Pre Assessment RN  [phone] option 4

## 2023-11-10 NOTE — LETTER
November 10, 2023      Jennifer Fallon  [address]    [facility] [address]              Colonoscopy/Upper endoscopy (EGD)     Procedure date: 11/29/23    Anticipated arrival time: 8:00 AM   (Procedure Times are Subject to Change)    Facility location: [facility]; [address] [place] [address] - Check in location[facility]st [facility]. Parking information: Self pay parking available in [facility]. The [facility] [place] is  located across [facility] from the hospital and is connected to the  hospital by a skyway. The skyway access is on Level C of the parking ramp.   parking is available Monday through Friday from 7 a.m.-3 p.m.  parking attendants are stationed at Door 2 at the [facility] entrance,  located off of [address]    Important Procedure Reminders:     Prep Instructions:   Instructions on how to prepare for your upcoming procedure are found below. Please read instructions carefully. Deviation from instructions may result in less than desired outcomes and procedure may need to be rescheduled. If you have additional questions regarding how to prepare for your upcoming procedure, please contact our endoscopy pre assessment nurses at [phone] option 4.      Policy:   On the day of your procedure, please designatean adult(s) who can drive you home stay with you for the next 24 hours. The medicines used in the exam will make you sleepy. You will not be able to drive. You cannot take public transportation, ride share services, or non-medical taxi service without a responsible caregiver.  Medical transport services are allowed with the requirement that a responsible caregiver will receive you at your destination.  We require that drivers and caregivers are confirmed prior to your procedure.    Day of procedure:  Please do not wear jewelry (i.e. earrings, rings, necklaces, watches, etc) . Leave your purse, billfold, credit cards, and other valuables at  home.   Bring insurance card and ID.     To cancel or reschedule your procedure:   Please call our endoscopy scheduling team at: [facility]: [phone], option 2. Monday through Friday, 7:00am-5:00pm.      Medication Reminders:    Please note the following medication holding recommendations:   Ferrous Sulfate (iron supplement): HOLD 7 days before procedure.    ----------------------------------------------------------------------------------------------------------------------------------------------------------------------------------------------------------------------------------------------------------------------      Bowel prep has been sent to   [id number] - [place], [place] - [address] [place] [address] [address] [facility]      Preparación extendida para la colonoscopia con Golytely  Deneen estas instrucciones detenidamente al menos 7 días antes del procedimiento de colonoscopia. Asegúrese de seguir todas las indicaciones por completo. El interior del colon debe estar limpio para permitir un examen completo a fin de detectar la presencia de crecimientos, pólipos o anomalías, así augustus la biopsia o la extracción de estos. Se incluyen varios consejos para que esta parte del procedimiento sea lo más cómoda posible.     A partir de ahora:  Un integrante del personal de enfermería le llamará judith semana antes del examen para recetarle lo que debe jose g para preparar el intestino, revisar las instrucciones de preparación y responder cualquier otra pregunta que tenga.   Usted debe ponerse de acuerdo con un adulto para que lo lleve a vaughan casa después del examen. La colonoscopia no se puede realizar a menos que cuente con traslado. Si necesita usar el transporte público, alguien debe acompañarlo y quedarse con usted donovan un mínimo de 6 a 24 horas.  Consulte con vaughan aseguradora para cerciorarse de que ellos vayan a pagar el examen. Póngase de acuerdo con un adulto responsable para  que lo/la lleve a casa el día del examen. Quien lo/la lleve a casa no puede ser solamente un servicio de taxi ni un autobús; necesitará a alguien que se quede con usted después del procedimiento.     7 días antes:  Hable con el médico que realizó la indicación: Si dannielle anticoagulantes (augustus Coumadin, Plavix, Xarelto, Eliquis o Lovenox, entre otros), puede ser que deba interrumpir vaughan ingesta de manera temporal, antes del procedimiento. El médico que realizó la indicación le dirá lo que debe hacer.   Si tiene diabetes, deberá solicitar que la pion sea temprano por la mañana.  Deje de jose g suplementos de fibra, multivitamínicos con adele y todos los medicamentos que contengan adele.  Surta vaughan receta de 2 envases de Golytely y 4 comprimidos de Dulcolax en la farmacia.  Es muy importante que se mantenga debidamente hidratado donovan la preparación para la colonoscopia. La solución Golytely para preparar el intestino está diseñada para limpiar el colon, anika no lo mantendrá hidratado. Mientras dure la preparación que se le haya prescrito, también deberá jose g 64 oz de Gatorade o de alguna bebida deportiva similar para mantenerse hidratado (evite los colores coleman y púrpura).   Deje de comer maíz, palomitas de maíz, peewee secos y alimentos con semillas.   Adopte judith dieta baja en fibras (consulte los ejemplos a continuación).     2 días antes:  Deje de jose g analgésicos antiinflamatorios no esteroideos (NSAID, por lien siglas en inglés) augustus Advil, Aleve, ibuprofeno, naproxeno o Motrin.  Judy líquidos para mantenerse hidratado. [place] es importante. Judy por lo menos 4 vasos grandes de agua, Gatorade o de alguna bebida deportiva similar.  Es recomendable aplicarse vaselina en la piel que rodea el ano después de cada evacuación para prevenir irritación. Las toallitas húmedas también ayudan a disminuir la irritación.   Puede comer un desayuno liviano, bajo en fibras. También puede comer un almuerzo liviano, bajo en fibras.   No consuma alimentos sólidos después de la 1 p. m. Pase a judith dieta de líquidos deborah. (consulte la lista a continuación).  A las 4 p. m., tome 2 comprimidos de Dulcolax (bisacodilo).  A las 5 p. m., disuelva en agua el Golytely y judy la mitad del envase. Judy un vaso de 8 oz de Golytely cada 15 minutos hasta que se termine la mitad del envase. Guarde en la nevera el giovanny de Golytely. Permanezca cerca del baño.      1 día antes:  Continúe con la dieta de líquidos deborah solamente (consulte los ejemplos a continuación). No consuma alimentos sólidos carey día.  No consuma bebidas de color coleman ni púrpura.  A las 4 p. m., tome 2 comprimidos de Dulcolax (bisacodilo).  A las 5 p. m., judy la segunda mitad del envase de Golytely. Judy un vaso de 8 oz de Golytely cada 10 o 15 minutos hasta que se termine el primer envase de Golytely.   La solución Golytely para preparar el intestino no lo mantendrá hidratado. Tiene que beber de 8 a 10 vasos de líquidos deborah donovan el día.  Antes de irse a dormir, mezcle el stephanie envase de Golytely y guárdelo en la nevera para la mañana siguiente.     El día del procedimiento:  6 horas antes de vaughan hora de registro, judy un vaso de 8 oz de Golytely cada 15 minutos hasta que se termine la mitad del stephanie envase.  NO DEBE beber el giovanny del stephanie envase de Golytely.   Puede jose g lien medicamentos necesarios por la mañana con sorbos de agua.  No fume, no mastique tabaco ni trague nada, ni siquiera agua ni chicle por lo menos 2 horas antes de vaughan hora de llegada. Abbey es un asunto de seguridad. El procedimiento podría cancelarse si no sigue las indicaciones.  Llegue con un adulto responsable que pueda llevarlo a casa después del examen y que pueda quedarse con usted donovan un mínimo de 24 horas: Los medicamentos que se usan lo harán sentirse somnoliento y olvidadizo. Si no cuenta con alguien que lo lleve a vaughan casa, cancelaremos el procedimiento. Si usa el transporte público, alguien  debe acompañarlo.  Realice las nebulizaciones y la terapia para despejar las vías respiratorias por la mañana, antes del procedimiento (si es necesario).  Si tiene asma, traiga vaughan inhalador.          LÍQUIDOS DEBORAH   Puede consumir lo siguiente:  Agua, té, café (sin leche ni crema).  Gaseosa, Gatorade (ni rojos ni púrpura).  Gelatina, palitos de helado (sin leche ni trozos de fruta; ni rojos ni púrpura).  Caldo o consomé sin grasa.  Caramelos duros simples, augustus caramelos deborah con forma de lidya (ni rojos ni púrpura).  Bebidas con sabor a fruta y jugos deborah, augustus jugo de manzana, jugo de uva kati, Hi-C y Taye-Aid (ni regan ni púrpura).    No consuma lo siguiente:  Leche o productos lácteos, augustus helado, malteadas o batidos, ni crema para café.  Bebidas regan o púrpura de ningún tipo, augustus jugo de arándano o jugo de uva. Evite gelatinas, palitos de helado, Taye-Aid, sorbetes y dulces rojos o púrpura.  Jugos con pulpa, augustus jugo de naranja, pomelo, pereira o tomate.  Sopa crema de ningún tipo.  Alcohol y cerveza.  Bebidas de proteínas o polvo de proteínas.            DIETA BAJA EN FIBRAS   Puede consumir lo siguiente:  Almidón: pan andino, panecillos, galletas, cruasanes, tostadas Guerda, tortillas de harina kati, wafles, panqueques, tostadas francesas, arroz andino, fideos, pasta, macarrones, alyse cocidas y peladas, galletas de agua comunes, galletas saladas, sémola cocida o crema de arroz, arroz inflado, hojuelas de maíz, Rice Krispies, Special K.   Vegetales: cocidos tiernos y enlatados; caldos de vegetales.  Frutas y jugos de fruta: jugo de fruta colado, fruta enlatada sin semillas ni cáscara (no de pereira), puré de manzana, puré de lucero, bananas maduras, melón (no sandía).   Productos lácteos: leche (común o saborizada), queso, queso cottage, yogur (que no sea de bayas), natillas, helado.   Proteínas: carne molida de res, jenkins, ternera, jamón, cerdo, belén, pavo, pescado o vísceras tiernos y glen cocidos;  huevos, mantequilla de maní cremosa.   Grasas y condimentos: margarina, mantequilla, aceites, mayonesa, crema agria, aderezo para ensaladas, fondo simple de cocción de carne; especias, hierbas cocidas, azúcar, jalea mike, miel, jarabe.   Bocadillos, dulces y bebidas: pretzels, caramelos duros, pasteles y galletas simples (sin peewee secos o semillas), gelatina, pudines simples, sorbetes, palitos de helado, café, té, bebidas carbonatadas (con gas). No consuma lo siguiente:  Almidón: panes o panecillos que contengan peewee secos, semillas o fruta; pan integral de too o de otros granos que contenga más de 1 gramo de fibra por rebanada, pan de maíz, tortillas de maíz o de too integral, alyse con cáscara, arroz integral, arroz ke, kasha (too sarraceno) y caro.   Vegetales: cualquier vegetal crudo o al vapor, vegetales con semillas, maíz de cualquier forma.   Frutas y jugos de fruta: ciruelas pasas, jugo de ciruela pasa, uvas pasas y otras frutas deshidratadas, bayas y otras frutas con semillas; jugos con pulpa enlatados, augustus jugo de naranja, pomelo, pereira o tomate.  Productos lácteos: cualquier yogur con peewee secos, semillas o bayas.   Proteínas: nghia duras, fibrosas con cartílago; frijoles, arvejas o lentejas secos cocidos, mantequilla de maní crujiente.  Grasas y condimentos: pepinillos, aceitunas, relish, rábano picante, mermelada, jalea, conservas.   Bocadillos, dulces y bebidas: palomitas de maíz, peewee secos, semillas, granola, diana, caramelos hechos con peewee secos o semillas; todos los postres que contengan peewee secos, semillas, uvas pasas y otras frutas deshidratadas, diana, granos integrales o salvado.        Preguntas frecuentes:   Cómo sabe si el colon está limpio?   Después de completar la preparación del intestino, lien deposiciones deben ser líquidas y aurora. Se verán similares a la orina en el inodoro. Si hay trozos de heces (rohit) en el inodoro o si no puede frank el fondo del  inodoro, llame a nuestra oficina para obtener asesoramiento. Llame al [phone] y pida hablar con un miembro del personal de enfermería.    Por qué necesita un conductor responsable que lo lleve a vaughan casa y se quede con usted?  Solicitamos que un adulto responsable lo lleve a vaughan casa por vaughan seguridad. Los medicamentos de sedación que se usan para relajarlo donovan el procedimiento pueden alterar vaughan juicio y tiempo de reacción, hacer que se sienta olvidadizo y posiblemente un poco inestable. No conduzca, tome decisiones importantes ni firme documentos legales donovan 24 horas después del procedimiento.   Es normal sentirse hinchado y con gases después del procedimiento. Caminar ayudará a  el aire a través del colon. Puede jose g analgésicos sin aspirina que contengan acetaminofén (Tylenol).    Cuándo puede comer después del procedimiento?  Puede retomar vaughan alimentación normal cuando se sienta listo, a menos que el médico que realiza el procedimiento le aconseje otra cosa. No trina alcohol donovan 24 horas después del procedimiento.   Puede reanudar lien actividades normales (trabajo, ejercicios, etc.) después de 24 horas.    Cuándo recibirá los resultados de la prueba?  Debería recibir los resultados del procedimiento y los resultados de laboratorio (si corresponde) por carta, mensaje de MyChart o llamada telefónica dentro de un plazo de 2 semanas. Si tiene alguna pregunta, llame al médico que lo derivó para realizarse el procedimiento.   Tripp por elegir [facility] para el procedimiento. Si recibe judith encuesta sobre vaughan atención, tómese el tiempo para completar el cuestionario.  Valoramos lien comentarios!  La asif [facility] representa judith colaboración entre los médicos de la [facility] y el centro médico de la [facility].          Golytely Extended Colonoscopy Prep  Prep instructions for colonoscopy   Pre-Assessment Phone Number: [facility]  [facility] Provider; [phone] option 4    Please read these instructions carefully at least 7 days prior to your colonoscopy procedure. Be sure to follow all directions completely. The inside of your colon must be clean to allow for a complete examination for the presence of any growths, polyps, and/or abnormalities, as well as their biopsy or removal. A number of tips are included in order to make this part of the procedure as comfortable as possible.    Immediately:  You will receive a call from a Nurse to review instructions and health history.  This assessment must be completed prior to your procedure.  Failure to complete the Nurse assessment may result in the procedure being cancelled.  You must arrange for an adult to drive you home after your exam. Your colonoscopy cannot be done unless you have a ride. If you need to use public transportation, someone must ride with you and stay with you for a minimum of 6-24 hours.  Check with your insurance company to be sure they will cover this exam.Arrange for a responsible adult to drive you home on the day of the exam. This cannot be a taxi or a bus as you will need someone with you after the procedure.    7 days prior:  Talk to your prescribing provider: If you take blood thinners (such as Coumadin, Plavix, Xarelto, Eliquis, Lovenox, or others), these medications may need to be stopped temporarily before your procedure. Your prescribing provider will tell you what to do.   Talk to your prescribing provider: If you take prescription NSAIDS (such as Sulindac, Celebrex, Mobic, Relafen). Your prescribing provider will tell you what to do.   If you have diabetes, you should request an early morning appointment.  Stop taking fiber supplements and multivitamins containing iron, or any other medications containing iron.  Fill your prescription for 2 containers of Golytely and 4 Dulcolax tablets at the pharmacy.  It is very important that you stay well hydrated during the  colonoscopy prep. The Golytely bowel prep is designed to clean out your colon, but it will not provide hydration. While you are taking the prescribed prep, you should also drink 64 oz. of Gatorade or similar sports drink product to drink for staying hydrated. (Avoid red and purple colors)  Stop eating corn, popcorn, nuts and foods with seeds.   Begin a restricted or low fiber diet (see list below).    2 days prior:  Drink fluids to be well hydrated, this is important. Drink at least 4 large glasses of water, Gatorade, or other similar sports drinks.  It is a good idea to use Vaseline on the skin around your anus after each bowel movement to prevent irritation. Wet wipes also help to reduce irritation.   You can have a light, low-fiber breakfast. You may also have a light, low-fiber lunch.  No solid foods after 1pm. Begin a clear liquid diet. (see list below).  At 4pm, take 2 Dulcolax (bisacodyl) tablets.  At 5pm, mix and drink half of a jug of Golytely bowel prep. Drink an 8 oz. Glass of Golytely every 10-15 minutes until half of the jug is gone. Place the remainder of the Golytely in the refrigerator. Stay close to the bathroom.     1 day prior:  Continue clear liquid diet only (see examples below). Do not eat solid food this day.  Do not drink any red or purple colored drinks.  Stop taking NSAID pain relievers, such as Advil, Ibuprofen, Motrin, etc.  You may take Tylenol.  At 4 pm, take 2 Dulcolax (bisacodyl) tablets.  At 5 pm, drink the 2nd half of a jug of Golytely bowel prep. Drink an 8 oz. glass of Golytely every 10-15 minutes until the 1st jug of Golytely is gone.   The Golytely bowel prep will not keep you hydrated. You should drink 8-10 glasses of clear liquids throughout the day.  Before you go to bed, mix the 2nd container of Golytely and place in the refrigerator for the morning.    Procedure day:  6 hours before your check-in time, drink an 8 oz. Glass of Golytely every 10-15 minutes until half of the  2nd jug of Golytely is gone. You WILL NOT drink the entire 2nd jug of Golytely.   You may take your necessary morning medications with sips of water  Do not take diabetes medicine by mouth until after your exam.  You may drink clear liquids only up until 2 hours before your arrival time.  Do not smoke, chew tobacco, or swallow anything, including water or gum for at least 2 hours before your arrival time. This is a safety issue. Your procedure could be cancelled if you do not follow directions.  Please do not wear jewelry (i.e. earrings, rings, necklaces, watches, etc) . Leave your purse, billfold, credit cards, and other valuables at home.   Please arrive with a responsible adult who can take you home after the test and stay with you for a minimum of 24 hours: The medicine used will make you sleepy and forgetful. If you do not have someone to take you home, we will cancel your procedure. If using public transportation you must have someone to ride with you.  Please perform your nebulizer treatments and airway clearance therapy in the morning prior to the procedure (if applicable).  If you have asthma, bring your inhalers.  CLEAR LIQUID DIET   You may have:  Water, tea, coffee (no milk or cream)  Soda pop, Gatorade (not red or purple)  Jell-O, Popsicles (no milk or fruit pieces - not red or purple)  Fat-free soup broth or bouillon  Plain hard candy, such as clear life savers (not red or purple)  Clear juices and fruit-flavored drinks, such as apple juice, white grape juice, Hi-C, and Taye-Aid (not red or purple)   Do not have:  Milk or milk products such as ice cream, malts or shakes, or coffee creamer  Red or purple drinks of any kind such as cranberry juice or grape juice. Avoid red or purple Jell-O, Popsicles, Taye-Aid, sorbet, sherbet and candy  Juices with pulp such as orange, grapefruit, pineapple or tomato juice  Cream soups of any kind  Alcohol and beer  Protein drinks or protein powder     LOW FIBER DIET    You may have:    Starches: White bread, rolls, biscuits, croissants, Norton toast, white flour tortillas, waffles, pancakes, Jordanian toast; white rice, noodles, pasta, macaroni; cooked and peeled potatoes; plain crackers, saltines; cooked farina or cream of rice; puffed rice, corn flakes, Rice Krispies, Special K   Vegetables: tender cooked and canned, vegetable broths  Fruits and fruit juices: Strained fruit juice, canned fruit without seeds or skin (not pineapple), applesauce, pear sauce, ripe bananas, melons (not watermelon)   Milk products: Milk (plain or flavored), cheese, cottage cheese, yogurt (no berries), custard, ice cream    Proteins: Tender, well-cooked ground beef, lamb, veal, ham, pork, chicken, turkey, fish or organ meats; eggs; creamy peanut butter   Fats and condiments:  Margarine, butter, oils, mayonnaise, sour cream, salad dressing, plain gravy; spices, cooked herbs; sugar, clear jelly, honey, syrup   Snacks, sweets and drinks: Pretzels, hard candy; plain cakes and cookies (no nuts or seeds); gelatin, plain pudding, sherbet, Popsicles; coffee, tea, carbonated ( fizzy ) drinks Do not have:    Starches: Breads or rolls that contain nuts, seeds or fruit; whole wheat or whole grain breads that contain more than 1 gram of fiber per slice; cornbread; corn or whole wheat tortillas; potatoes with skin; brown rice, wild rice, kasha (buckwheat), and oatmeal   Vegetables: Any raw or steamed vegetables; vegetables with seeds; corn in any form   Fruits and fruit juices: Prunes, prune juice, raisins and other dried fruits, berries and other fruits with seeds, canned pineapple juices with pulp such as orange, grapefruit, pineapple or tomato juice  Milk products: Any yogurt with nuts, seeds or berries   Proteins: Tough, fibrous meats with gristle; cooked dried beans, peas or lentils; crunchy peanut butter  Fats and condiments: Pickles, olives, relish, horseradish; jam, marmalade, preserves   Snacks, sweets and  drinks: Popcorn, nuts, seeds, granola, coconut, candies made with nuts or seeds; all desserts that contain nuts, seeds, raisins and other dried fruits, coconut, whole grains or bran.      FAQ:    How do you know if your colon is cleaned out?   After completing the bowel prep, your bowel movements should be all liquid and yellow. Your bowel movements will look similar to urine in the toilet. If there are pieces of stool (poop) in the toilet, or if you can't see to the bottom of the toilet, please call our office for advice. Call [phone] and ask to speak with a nurse.   Why do you need a responsible  to take you home and stay with you?  We require a responsible adult to take you home for your safety. The sedation medicines used to relax you during the procedure can impair your judgement and reaction time, make you forgetful and possible a little unsteady. Do not drive, make any important decisions, or sign any legal documents for 24 hours after your procedure.   It is normal to feel bloated and gassy after your procedure. Walking will help move the air through your colon. You can take non-aspirin pain relievers that contain acetaminophen (Tylenol).   When can you eat after your procedure?  You may resume your normal diet when you feel ready, unless advised otherwise by the doctor performing your procedure. Do not drink alcohol for 24 hours after your procedure.   You many resume normal activities (work, exercise, etc.) after 24 hours.   When will you get test results?  You should have your procedure results and any lab results (if applicable) by letter, MyChart message, or phone call within 2 weeks. If you have any questions, please call the doctor that referred you for the procedure.       Thank you for choosing  [facility], for your procedure. If you are sent a survey regarding your care, please take the time to complete the questionnaire. We value your feedback!

## 2023-11-14 NOTE — TELEPHONE ENCOUNTER
Utilized  ID#:[id number]     Attempted to contact patient in order to complete pre assessment questions.     No answer. Left message to return call to [phone] option 4        Valery Jacobs RN  Endoscopy Procedure Pre Assessment RN

## 2023-11-20 NOTE — TELEPHONE ENCOUNTER
Utilized  ID#:[id number]     Attempted to contact patient in order to complete pre assessment questions. (2nd attempt)    Patient answered but stated she is in [place]. She would prefer to call back tomorrow when she gets home. Instructed patient to call back to [phone] option 4. Informed patient that if we don't hear back by tomorrow, we will attempt another call on Wednesday.      Valery Jacobs RN  Endoscopy Procedure Pre Assessment RN

## 2023-11-22 RX ORDER — BISACODYL 5 MG/1
TABLET, DELAYED RELEASE ORAL
Qty: 4 TABLET | Refills: 0 | Status: SHIPPED | OUTPATIENT
Start: 2023-11-22 | End: 2023-12-18

## 2023-11-22 NOTE — TELEPHONE ENCOUNTER
Patient called through to scheduling and was transferred with  to RN.     Pre assessment completed for upcoming procedure.   (Please see previous telephone encounter notes for complete details)    Patient  and   returned call.       Procedure details:    Arrival time and facility location reviewed.    Pre op exam needed? N/A    Designated  policy reviewed. Instructed to have someone stay 6 hours post procedure.     COVID policy reviewed.      Medication review:    Medications reviewed. Please see supporting documentation below. Holding recommendations discussed (if applicable).       Prep for procedure:     Procedure prep instructions reviewed.        Additional information needed?  Patient states she has not received letters in the mail and when she went to the pharmacy and did not have her prep. Prep was resent. Letter was mailed again.    Patient  and   verbalized understanding and had no questions or concerns at this time.      Dennise Fountain RN  Endoscopy Procedure Pre Assessment RN  [phone] option 4

## 2023-11-22 NOTE — TELEPHONE ENCOUNTER
Third call attempt to complete pre assessment.     No answer.  Left message to return call to [phone] #4 by next business day (Friday Nov 24) prior to 4PM or procedure will be sent to cancel.     Additional information needed?  N/A      Valery Jacobs, PO  Endoscopy Procedure Pre Assessment RN

## 2023-11-27 NOTE — TELEPHONE ENCOUNTER
Incoming call from patient with assistance of .     Review [facility] extended prep instructions in great detail. Patient to start CLD today after 1pm. Reviewed arrival time and where to check in.       Cathi Alvarez RN  Endoscopy Procedure Pre Assessment RN  [phone] option 4

## 2023-11-29 ENCOUNTER — HOSPITAL ENCOUNTER (OUTPATIENT)
Facility: CLINIC | Age: 68
Discharge: HOME | End: 2023-11-29
Attending: INTERNAL MEDICINE | Admitting: INTERNAL MEDICINE
Payer: COMMERCIAL

## 2023-11-29 VITALS
BODY MASS INDEX: 34.78 KG/M2 | OXYGEN SATURATION: 97 % | HEIGHT: 64 IN | SYSTOLIC BLOOD PRESSURE: 142 MMHG | RESPIRATION RATE: 18 BRPM | HEART RATE: 55 BPM | DIASTOLIC BLOOD PRESSURE: 69 MMHG

## 2023-11-29 DIAGNOSIS — K29.00 ACUTE SUPERFICIAL GASTRITIS WITHOUT HEMORRHAGE: Primary | ICD-10-CM

## 2023-11-29 LAB
COLONOSCOPY: NORMAL
UPPER GI ENDOSCOPY: NORMAL

## 2023-11-29 PROCEDURE — 99153 MOD SED SAME PHYS/QHP EA: CPT | Performed by: INTERNAL MEDICINE

## 2023-11-29 PROCEDURE — 43239 EGD BIOPSY SINGLE/MULTIPLE: CPT | Performed by: INTERNAL MEDICINE

## 2023-11-29 PROCEDURE — 88305 TISSUE EXAM BY PATHOLOGIST: CPT | Mod: 26 | Performed by: PATHOLOGY

## 2023-11-29 PROCEDURE — G0500 MOD SEDAT ENDO SERVICE >5YRS: HCPCS | Performed by: INTERNAL MEDICINE

## 2023-11-29 PROCEDURE — 88305 TISSUE EXAM BY PATHOLOGIST: CPT | Mod: TC | Performed by: INTERNAL MEDICINE

## 2023-11-29 PROCEDURE — 45380 COLONOSCOPY AND BIOPSY: CPT | Performed by: INTERNAL MEDICINE

## 2023-11-29 PROCEDURE — 88342 IMHCHEM/IMCYTCHM 1ST ANTB: CPT | Mod: 26 | Performed by: PATHOLOGY

## 2023-11-29 PROCEDURE — 250N000011 HC RX IP 250 OP 636: Performed by: INTERNAL MEDICINE

## 2023-11-29 PROCEDURE — 250N000009 HC RX 250: Performed by: INTERNAL MEDICINE

## 2023-11-29 RX ORDER — NALOXONE HYDROCHLORIDE 0.4 MG/ML
0.2 INJECTION, SOLUTION INTRAMUSCULAR; INTRAVENOUS; SUBCUTANEOUS
Status: CANCELLED | OUTPATIENT
Start: 2023-11-29

## 2023-11-29 RX ORDER — LIDOCAINE 40 MG/G
CREAM TOPICAL
Status: CANCELLED | OUTPATIENT
Start: 2023-11-29

## 2023-11-29 RX ORDER — ONDANSETRON 2 MG/ML
4 INJECTION INTRAMUSCULAR; INTRAVENOUS EVERY 6 HOURS PRN
Status: CANCELLED | OUTPATIENT
Start: 2023-11-29

## 2023-11-29 RX ORDER — FENTANYL CITRATE 50 UG/ML
INJECTION, SOLUTION INTRAMUSCULAR; INTRAVENOUS PRN
Status: DISCONTINUED | OUTPATIENT
Start: 2023-11-29 | End: 2023-11-29 | Stop reason: HOSPADM

## 2023-11-29 RX ORDER — OMEPRAZOLE 40 MG/1
40 CAPSULE, DELAYED RELEASE ORAL DAILY
Qty: 60 CAPSULE | Refills: 0 | Status: SHIPPED | OUTPATIENT
Start: 2023-11-29 | End: 2024-02-15

## 2023-11-29 RX ORDER — NALOXONE HYDROCHLORIDE 0.4 MG/ML
0.4 INJECTION, SOLUTION INTRAMUSCULAR; INTRAVENOUS; SUBCUTANEOUS
Status: CANCELLED | OUTPATIENT
Start: 2023-11-29

## 2023-11-29 RX ORDER — ONDANSETRON 4 MG/1
4 TABLET, ORALLY DISINTEGRATING ORAL EVERY 6 HOURS PRN
Status: CANCELLED | OUTPATIENT
Start: 2023-11-29

## 2023-11-29 RX ORDER — FLUMAZENIL 0.1 MG/ML
0.2 INJECTION, SOLUTION INTRAVENOUS
Status: CANCELLED | OUTPATIENT
Start: 2023-11-29 | End: 2023-11-29

## 2023-11-29 RX ORDER — ONDANSETRON 2 MG/ML
4 INJECTION INTRAMUSCULAR; INTRAVENOUS
Status: CANCELLED | OUTPATIENT
Start: 2023-11-29

## 2023-11-29 RX ORDER — PROCHLORPERAZINE MALEATE 5 MG
5 TABLET ORAL EVERY 6 HOURS PRN
Status: CANCELLED | OUTPATIENT
Start: 2023-11-29

## 2023-11-29 ASSESSMENT — ACTIVITIES OF DAILY LIVING (ADL)
ADLS_ACUITY_SCORE: 35
ADLS_ACUITY_SCORE: 35

## 2023-11-29 NOTE — LETTER
December 4, 2023      Jennifer Fallon  [address] [address]        Dear Ms.Perez Fallon,    We are writing to inform you of your test results. As we discussed on the phone there is a bacteria in your stomach called h pylori. This bacteria can cause inflammation in your stomach and needs to be treated. My office will reach out to you to schedule a visit with our pharmacy team to arrange treatment for this bacteria. It is very important that you take this antibiotic just as instructed.    The polyp in your colon is a benign polyp and has no risk for turning into cancer. Because you have had polyps in the past I recommend you have a repeat colonoscopy in 7 years.    If you have any questions please do not hesitate to call my nurse coordinator, Michelle Palomino, at [phone].    Sincerely,  Dr. Reyes        Resulted Orders   Surgical Pathology Exam   Result Value Ref Range    Case Report       Surgical Pathology Report                         Case: [id number]                                  Authorizing Provider:  Hayder Reyes,  Collected:           11/29/2023 09:31 AM                                 MD                                                                           Ordering Location:     [facility]          Received:            11/29/2023 10:28 AM                                 [facility]                                                          Pathologist:           Hayder Peralta MD                                                                           Specimens:   A) - Small Intestine, Duodenum                                                                      B) - Stomach, Antrum, incisura, r/o h pylori                                                        C) - Stomach, Body, r/o h pylori                                                                     D) -  Stomach, Antrum                                                                                E) - Stomach, Body                                                                                  F) - Large Intestine, Colon, Cecum                                                         Addendum       B: For further evaluation, additional stains on block B1 (Helicobacter pylori) are performed with appropriate controls and support the interpretation.  The Helicobacter pylori stain is positive for organisms.    C: For further evaluation, additional stains on block C1 (Helicobacter pylori) are performed with appropriate controls and support the interpretation.  The Helicobacter pylori stain is positive for organisms.    D: For further evaluation, additional stains on block D1 (Helicobacter pylori) are performed with appropriate controls and support the interpretation.  The Helicobacter pylori stain is positive for organisms.    E: For further evaluation, additional stains on block E1 (Helicobacter pylori) are performed with appropriate controls and support the interpretation.  The Helicobacter pylori stain is positive for organisms.    F: Deeper histologic levels (on F1) are obtained for additional visualization and the original diagnosis is unchanged.      Final Diagnosis       A: Small intestine, duodenum, biopsy:  - Small bowel mucosa without diagnostic abnormality.  - No diagnostic histologic features of celiac disease/gluten-sensitive enteropathy, Whipple's disease, or Giardia microorganisms.  - Negative for dysplasia and malignancy.     B: Stomach, antrum, incisura, biopsy:  - Active chronic gastritis.  - Negative for dysplasia and malignancy.  - Additional stains for Helicobacter pylori are pending and will be reported in an addendum.    C: Stomach, body, biopsy:  - Active chronic gastritis.  - Negative for dysplasia and malignancy.  - Additional stains for Helicobacter pylori are pending and will be reported in  an addendum.    D: Stomach, antrum, polyp, biopsy/polypectomy:  - Polypoid gastric mucosa with active chronic gastritis.  - Negative for dysplasia and malignancy.  - Additional stains for Helicobacter pylori are pending and will be reported in an addendum.    E: Stomach, body, polyp, biopsy/polypectomy:  - Hyperplastic polyp with acute and chronic inflammation.  - Negative for dysplasia and malignancy.  - Additional stains for Helicobacter pylori are pending and will be reported in an addendum.    F: Large intestine, cecum, polyp, biopsy/polypectomy:  - Colonic mucosa without sufficient histologic features of polyp, mass, dysplasia, or malignancy on initial routine histologic sections.  Additional histologic levels will be obtained with findings to be reported in an addendum.  - Increased intraepithelial lymphocytes; cannot exclude emerging microscopic colitis.  - Size of targeted polyp: 2 mm in greatest dimension (per operative note).  - Resection and retrieval of targeted polyp: complete (per operative note).      Clinical Information       Clinical information pertinent to this case is available in the electronic medical record and/or specimen requisition and was reviewed by the signing pathologist.       Gross Description       A(1). Small Intestine, Duodenum, :  The specimen is received in formalin, labeled with the patient's name, medical record number and other identifying information and designated  duodenum . It consists of 6 tan soft tissue fragments ranging from 0.2-0.3 cm. Entirely submitted in one cassette.    B(2). Stomach, Antrum, incisura, r/o h pylori:  The specimen is received in formalin, labeled with the patient's name, medical record number and other identifying information and designated  stomach, antrum . It consists of two tan soft tissue fragments, each 0.2 cm. Entirely submitted in one cassette.    C(3). Stomach, Body, r/o h pylori:  The specimen is received in formalin, labeled with the  "patient's name, medical record number and other identifying information and designated  stomach, body . It consists of 4 tan soft tissue fragments ranging from 0.1-0.2 cm. Entirely submitted in one cassette.    D(4). Stomach, Antrum, :  The specimen is received in formalin, labeled with the patient's name, medical record number and other identifying information designated \"stomach, antrum polyp\". It consists of a 0.8 cm polypoid tissue fragment.  Inked black, bisected and entirely submitted in 1 cassette.    E(5). Stomach, Body, :  The specimen is received in formalin, labeled with the patient's name, medical record number and other identifying information designated \"stomach, body polyp\". It consists of a 0.9 cm polypoid tissue fragment.  Inked blue at the presumed resection site, bisected and entirely submitted in 1 cassette.    F(6). Large Intestine, Colon, Cecum, :  The specimen is received in formalin, labeled with the patient's name, medical record number and other identifying information and designated  cecum polyp . It consists of two tan soft tissue fragments, 0.2 and 0.3 cm. Entirely submitted in one cassette.   (PRISCILA Woods)      Microscopic Description       A microscopic examination is performed.      Performing Labs       The technical component of this testing was completed at [facility]      Case Images                     "

## 2023-11-29 NOTE — H&P
"Jennifer Fallon  [id number]  female  68 year old      Reason for procedure/surgery: iron deficiency anemia    Patient Active Problem List   Diagnosis    Advanced directives, counseling/discussion    CARDIOVASCULAR SCREENING; LDL GOAL LESS THAN 70    Postmenopausal status    Bilateral ankle pain    Dermatophytosis of nail    Diabetes mellitus type 2 in obese (H)    Hypothyroidism, unspecified type    Iron deficiency anemia, unspecified iron deficiency anemia type    Vitamin D deficiency    Morbid obesity (H)    Deltoid tendinitis of right shoulder    Abnormal Pap smear of cervix       Past Surgical History:    Past Surgical History:   Procedure Laterality Date    CHOLECYSTECTOMY  2004    COLONOSCOPY Left 6/23/2017    Procedure: COMBINED COLONOSCOPY, SINGLE OR MULTIPLE BIOPSY/POLYPECTOMY BY BIOPSY;  COLONOSCOPY with polypectomy of colon polyp by cold biopsy  ;  Surgeon: Chang Beasley MD;  Location:  GI    TUBAL LIGATION  1987       Past Medical History:   Past Medical History:   Diagnosis Date    Abnormal Pap smear of cervix 2012 approx date - pt reports in 10/31/18 visit notes that \"additional testing\" was performed    CARDIOVASCULAR SCREENING; LDL GOAL LESS THAN 130     Constipation     Diabetes (H) diagnosed 2016    controlled with diet only; no meds.    Diarrhea     Hypothyroidism        Social History:   Social History     Tobacco Use    Smoking status: Never    Smokeless tobacco: Never   Substance Use Topics    Alcohol use: Yes     Alcohol/week: 0.0 standard drinks of alcohol     Comment: Socially       Family History:   Family History   Problem Relation Age of Onset    Diabetes Brother     Diabetes Brother     Coronary Artery Disease Mother     Breast Cancer No family hx of     Colon Cancer No family hx of     Prostate Cancer No family hx of     Other Cancer No family hx of        Allergies: No Known Allergies    Active Medications:   No current outpatient medications on file.       Systemic " "Review:   CONSTITUTIONAL: NEGATIVE for fever, chills, change in weight  ENT/MOUTH: NEGATIVE for ear, mouth and throat problems  RESP: NEGATIVE for significant cough or SOB  CV: NEGATIVE for chest pain, palpitations or peripheral edema    Physical Examination:   Vital Signs: /65   Pulse 60   Resp 16   Ht 1.626 m (5' 4\")   LMP  (LMP Unknown)   SpO2 99%   BMI 34.78 kg/m    GENERAL: healthy, alert and no distress  NECK: no adenopathy, no asymmetry, masses, or scars  RESP: lungs clear to auscultation - no rales, rhonchi or wheezes  CV: regular rate and rhythm, normal S1 S2, no S3 or S4, no murmur, click or rub, no peripheral edema and peripheral pulses strong  ABDOMEN: soft, nontender, no hepatosplenomegaly, no masses and bowel sounds normal  MS: no gross musculoskeletal defects noted, no edema    ASA Classification: (II)  Mild systemic disease  Airway Exam: Mallampati Score: Class III (Visualization of only the base of uvula)    Plan: Appropriate to proceed as scheduled.      Hayder Reyes MD  11/29/2023    PCP:  Kesha Rodas    "

## 2023-12-01 ENCOUNTER — APPOINTMENT (OUTPATIENT)
Dept: INTERPRETER SERVICES | Facility: CLINIC | Age: 68
End: 2023-12-01
Payer: COMMERCIAL

## 2023-12-01 LAB
PATH REPORT.ADDENDUM SPEC: NORMAL
PATH REPORT.COMMENTS IMP SPEC: NORMAL
PATH REPORT.COMMENTS IMP SPEC: NORMAL
PATH REPORT.FINAL DX SPEC: NORMAL
PATH REPORT.GROSS SPEC: NORMAL
PATH REPORT.MICROSCOPIC SPEC OTHER STN: NORMAL
PATH REPORT.RELEVANT HX SPEC: NORMAL
PHOTO IMAGE: NORMAL

## 2023-12-04 ENCOUNTER — CARE COORDINATION (OUTPATIENT)
Dept: GASTROENTEROLOGY | Facility: CLINIC | Age: 68
End: 2023-12-04
Payer: COMMERCIAL

## 2023-12-04 ENCOUNTER — APPOINTMENT (OUTPATIENT)
Dept: INTERPRETER SERVICES | Facility: CLINIC | Age: 68
End: 2023-12-04
Payer: COMMERCIAL

## 2023-12-04 DIAGNOSIS — B96.81 HELICOBACTER PYLORI GASTRITIS: Primary | ICD-10-CM

## 2023-12-04 DIAGNOSIS — K29.70 HELICOBACTER PYLORI GASTRITIS: Primary | ICD-10-CM

## 2023-12-06 ENCOUNTER — TELEPHONE (OUTPATIENT)
Dept: GASTROENTEROLOGY | Facility: CLINIC | Age: 68
End: 2023-12-06
Payer: COMMERCIAL

## 2023-12-06 ENCOUNTER — APPOINTMENT (OUTPATIENT)
Dept: INTERPRETER SERVICES | Facility: CLINIC | Age: 68
End: 2023-12-06
Payer: COMMERCIAL

## 2023-12-06 NOTE — TELEPHONE ENCOUNTER
----- Message from Hayder Reyes MD sent at 12/4/2023  1:32 PM CST -----  Regarding: Follow up on this patient  Hi Davy,    I put an MTM referral in on this patient for h pylori.    But she also needs follow up with general GI [facility] in 3 months to follow up treatment and make sure anemia improves.    Can you help arrange follow up?    Thanks  ROWAN

## 2023-12-06 NOTE — TELEPHONE ENCOUNTER
MTM appointment scheduled on 12/20.    [employer] message sent to [facility] requesting assistance with scheduling follow up appointment.

## 2023-12-13 ENCOUNTER — TELEPHONE (OUTPATIENT)
Dept: GASTROENTEROLOGY | Facility: CLINIC | Age: 68
End: 2023-12-13
Payer: COMMERCIAL

## 2023-12-13 ENCOUNTER — APPOINTMENT (OUTPATIENT)
Dept: INTERPRETER SERVICES | Facility: CLINIC | Age: 68
End: 2023-12-13
Payer: COMMERCIAL

## 2023-12-13 NOTE — TELEPHONE ENCOUNTER
Attempted multiple calls through  service. Number answered by person other than pt, claimed not to know pt, no service on other numbers. No myc.   Letter sent trying to schedule w gen GI provider in 2-3 mths.

## 2023-12-15 ENCOUNTER — LAB (OUTPATIENT)
Dept: LAB | Facility: CLINIC | Age: 68
End: 2023-12-15
Payer: COMMERCIAL

## 2023-12-15 DIAGNOSIS — D50.9 IRON DEFICIENCY ANEMIA, UNSPECIFIED IRON DEFICIENCY ANEMIA TYPE: ICD-10-CM

## 2023-12-15 LAB
BASOPHILS # BLD AUTO: 0 10E3/UL (ref 0–0.2)
BASOPHILS NFR BLD AUTO: 0 %
EOSINOPHIL # BLD AUTO: 0.2 10E3/UL (ref 0–0.7)
EOSINOPHIL NFR BLD AUTO: 3 %
ERYTHROCYTE [DISTWIDTH] IN BLOOD BY AUTOMATED COUNT: 16.3 % (ref 10–15)
FERRITIN SERPL-MCNC: 32 NG/ML (ref 11–328)
HCT VFR BLD AUTO: 40.6 % (ref 35–47)
HGB BLD-MCNC: 13.3 G/DL (ref 11.7–15.7)
IMM GRANULOCYTES # BLD: 0 10E3/UL
IMM GRANULOCYTES NFR BLD: 0 %
IRON BINDING CAPACITY (ROCHE): 317 UG/DL (ref 240–430)
IRON SATN MFR SERPL: 12 % (ref 15–46)
IRON SERPL-MCNC: 39 UG/DL (ref 37–145)
LYMPHOCYTES # BLD AUTO: 1.6 10E3/UL (ref 0.8–5.3)
LYMPHOCYTES NFR BLD AUTO: 23 %
MCH RBC QN AUTO: 28.4 PG (ref 26.5–33)
MCHC RBC AUTO-ENTMCNC: 32.8 G/DL (ref 31.5–36.5)
MCV RBC AUTO: 87 FL (ref 78–100)
MONOCYTES # BLD AUTO: 0.5 10E3/UL (ref 0–1.3)
MONOCYTES NFR BLD AUTO: 7 %
NEUTROPHILS # BLD AUTO: 4.4 10E3/UL (ref 1.6–8.3)
NEUTROPHILS NFR BLD AUTO: 66 %
PLATELET # BLD AUTO: 218 10E3/UL (ref 150–450)
RBC # BLD AUTO: 4.68 10E6/UL (ref 3.8–5.2)
WBC # BLD AUTO: 6.6 10E3/UL (ref 4–11)

## 2023-12-15 PROCEDURE — 82728 ASSAY OF FERRITIN: CPT

## 2023-12-15 PROCEDURE — 83550 IRON BINDING TEST: CPT

## 2023-12-15 PROCEDURE — 36415 COLL VENOUS BLD VENIPUNCTURE: CPT

## 2023-12-15 PROCEDURE — 83540 ASSAY OF IRON: CPT

## 2023-12-15 PROCEDURE — 85025 COMPLETE CBC W/AUTO DIFF WBC: CPT

## 2023-12-18 ENCOUNTER — ONCOLOGY VISIT (OUTPATIENT)
Dept: ONCOLOGY | Facility: CLINIC | Age: 68
End: 2023-12-18
Attending: PHYSICIAN ASSISTANT
Payer: COMMERCIAL

## 2023-12-18 VITALS
SYSTOLIC BLOOD PRESSURE: 144 MMHG | WEIGHT: 219 LBS | TEMPERATURE: 97 F | RESPIRATION RATE: 16 BRPM | BODY MASS INDEX: 37.39 KG/M2 | DIASTOLIC BLOOD PRESSURE: 78 MMHG | OXYGEN SATURATION: 96 % | HEIGHT: 64 IN | HEART RATE: 60 BPM

## 2023-12-18 DIAGNOSIS — D50.9 IRON DEFICIENCY ANEMIA, UNSPECIFIED IRON DEFICIENCY ANEMIA TYPE: Primary | ICD-10-CM

## 2023-12-18 PROCEDURE — 99214 OFFICE O/P EST MOD 30 MIN: CPT | Performed by: PHYSICIAN ASSISTANT

## 2023-12-18 PROCEDURE — 99213 OFFICE O/P EST LOW 20 MIN: CPT | Performed by: PHYSICIAN ASSISTANT

## 2023-12-18 RX ORDER — FERROUS SULFATE 325(65) MG
325 TABLET, DELAYED RELEASE (ENTERIC COATED) ORAL DAILY
COMMUNITY
Start: 2023-12-18

## 2023-12-18 ASSESSMENT — PAIN SCALES - GENERAL: PAINLEVEL: WORST PAIN (10)

## 2023-12-18 NOTE — NURSING NOTE
"Oncology Rooming Note    December 18, 2023 9:43 AM   Jennifer Fallon is a 68 year old female who presents for:    Chief Complaint   Patient presents with    Oncology Clinic Visit     Initial Vitals: BP (!) 144/78 (Cuff Size: Adult Large)   Pulse 60   Temp 97  F (36.1  C) (Tympanic)   Resp 16   Ht 1.626 m (5' 4\")   Wt 99.3 kg (219 lb)   LMP  (LMP Unknown)   SpO2 96%   BMI 37.59 kg/m   Estimated body mass index is 37.59 kg/m  as calculated from the following:    Height as of this encounter: 1.626 m (5' 4\").    Weight as of this encounter: 99.3 kg (219 lb). Body surface area is 2.12 meters squared.  Worst Pain (10) Comment: Data Unavailable   No LMP recorded (lmp unknown). Patient is postmenopausal.  Allergies reviewed: Yes  Medications reviewed: Yes    Medications: Medication refills not needed today.  Pharmacy name entered into [employer]: [id number] - [place], [place] - [address] [place] [address] [address] [facility] [address]    Clinical concerns: 3 month f/u and lt arm pain x 3 weeks      Lili Marin CMA              "

## 2023-12-18 NOTE — LETTER
12/18/2023         RE: Jennifer Fallon  [address] [address]        Dear Colleague,    Thank you for referring your patient, Jennifer Fallon, to the [facility]. Please see a copy of my visit note below.    Oncology/Hematology Visit Note    Dec 18, 2023    Reason for visit: Follow-up iron deficiency anemia    Oncology HPI: Jennifer Fallon is a 68 year old female with iron deficiency anemia.  She has a history of this and had previously been on iron, but then discontinued. She was seen for a routine clinic visit on 8/16/23 and noted to be anemic with hemoglobin 7.3 and MCV 64.  Prior Hemoglobin from 2021 with mild anemia with hemoglobin 10.0 and MCV 78.  Repeat lab on 8/18 with hemoglobin of 7.0 and she was sent to the ED and received a blood transfusion.  Colonoscopy 3 years ago was negative.  She had some exertional dyspnea, but improved with blood transfusion.  She chews ice chips.     She was started on oral iron with significant improvement of her hemoglobin.  EGD and colonoscopy in 11/29/2023 with a few polyps, otherwise unremarkable.  She is here today for repeat labs and close follow-up.     Interval History: Jennifer is here with her , Earle.   is also present.  She continues on daily iron that she is feeling great.  Her BUENO has completely resolved.  She had few polyps on her EGD and colonoscopy.  H. pylori was positive and she is meeting with the GI/pharmacy team later this week to discuss treatment.  Fatigue is significantly improved. No chest pain, fever or chills, bleeding, abdominal pain.    Review of Systems: See interval hx. Denies fevers, chills, cough, CP, SOB, N/V, diarrhea, changes in urination, bleeding, bruising.     PMHx and Social Hx reviewed per EPIC.      Medications:  Current Outpatient Medications   Medication Sig Dispense Refill     atorvastatin (LIPITOR) 20 MG tablet Take 1 tablet (20 mg) by mouth daily  "90 tablet 0     ferrous sulfate (FE TABS) 325 (65 Fe) MG EC tablet Take 1 tablet (325 mg) by mouth daily (Patient taking differently: Take 325 mg by mouth daily 2 tablets daily) 90 tablet 1     levothyroxine (SYNTHROID/LEVOTHROID) 112 MCG tablet Take 1 tablet (112 mcg) by mouth daily 90 tablet 3     omeprazole (PRILOSEC) 40 MG DR capsule Take 1 capsule (40 mg) by mouth daily 60 capsule 0     vitamin D3 (CHOLECALCIFEROL) 50 mcg (2000 units) tablet Take 2 tablets (100 mcg) by mouth daily 200 tablet 3       No Known Allergies      EXAM:    BP (!) 144/78 (Cuff Size: Adult Large)   Pulse 60   Temp 97  F (36.1  C) (Tympanic)   Resp 16   Ht 1.626 m (5' 4\")   Wt 99.3 kg (219 lb)   LMP  (LMP Unknown)   SpO2 96%   BMI 37.59 kg/m      GENERAL:  Female, in no acute distress.  Alert and oriented x3. Well groomed.   HEENT:  Normocephalic, atraumatic. No conjunctival injection or eye swelling.   LUNGS:  Nonlabored breathing, no cough or audible wheezing, able to speak full sentences.  MSK: Full ROM UE.    SKIN: No rash on exposed skin.   NEURO: CN grossly intact, speech normal  PSYCH: Mentation appears normal, insight and judgement intact      Labs:    12/15/23 09:57   Ferritin 32   Iron 39   Iron Binding Capacity 317   Iron Sat Index 12 (L)   WBC 6.6   Hemoglobin 13.3   Hematocrit 40.6   Platelet Count 218   RBC Count 4.68   MCV 87   MCH 28.4   MCHC 32.8   RDW 16.3 (H)   % Neutrophils 66   % Lymphocytes 23   % Monocytes 7   % Eosinophils 3   % Basophils 0   Absolute Basophils 0.0   Absolute Eosinophils 0.2   Absolute Immature Granulocytes 0.0   Absolute Lymphocytes 1.6   Absolute Monocytes 0.5   % Immature Granulocytes 0   Absolute Neutrophils 4.4     Imaging: n/a    Impression/Plan: Jennifer Fallon is a 68 year old female with CAROLYN, currently on po iron.     CAROLYN: Labs consistent with CAROLYN on 8/16/2023, she started iron oral iron twice a day for 1 to 2 months, significant improvement of her anemia.  She is now on 1 " tablet daily and her hemoglobin is 13.3, which is great.  EGD and colonoscopy with polyps, but otherwise unremarkable.  She has very minimal side effects now.  -Continue p.o. iron 1 tablet daily  -Dr. Avila and repeat CBC/iron studies in 6 months    BUENO: Secondary to CAROLYN, hemoglobin improving, now resolved.    Hpylori: Diagnosed on EGD from 11/29/2023.  She has an appointment later today with GI/pharmacy to discuss treatment.  She is essentially asymptomatic, but we discussed diet changes as well.    Chart documentation with Dragon Voice recognition Software. Although reviewed after completion, some words and grammatical errors may remain.    30 minutes spent on the date of the encounter doing chart review, review of test results, interpretation of tests, patient visit, documentation, discussion with other provider(s), and discussion with family     Michell Fairchild PA-C  Hematology/Oncology  [facility]                            Again, thank you for allowing me to participate in the care of your patient.        Sincerely,        Michell Fairchild PA-C

## 2023-12-18 NOTE — PROGRESS NOTES
Oncology/Hematology Visit Note    Dec 18, 2023    Reason for visit: Follow-up iron deficiency anemia    Oncology HPI: Jennifer Fallon is a 68 year old female with iron deficiency anemia.  She has a history of this and had previously been on iron, but then discontinued. She was seen for a routine clinic visit on 8/16/23 and noted to be anemic with hemoglobin 7.3 and MCV 64.  Prior Hemoglobin from 2021 with mild anemia with hemoglobin 10.0 and MCV 78.  Repeat lab on 8/18 with hemoglobin of 7.0 and she was sent to the ED and received a blood transfusion.  Colonoscopy 3 years ago was negative.  She had some exertional dyspnea, but improved with blood transfusion.  She chews ice chips.     She was started on oral iron with significant improvement of her hemoglobin.  EGD and colonoscopy in 11/29/2023 with a few polyps, otherwise unremarkable.  She is here today for repeat labs and close follow-up.     Interval History: Jennifer is here with her , Earle.   is also present.  She continues on daily iron that she is feeling great.  Her BUENO has completely resolved.  She had few polyps on her EGD and colonoscopy.  H. pylori was positive and she is meeting with the GI/pharmacy team later this week to discuss treatment.  Fatigue is significantly improved. No chest pain, fever or chills, bleeding, abdominal pain.    Review of Systems: See interval hx. Denies fevers, chills, cough, CP, SOB, N/V, diarrhea, changes in urination, bleeding, bruising.     PMHx and Social Hx reviewed per EPIC.      Medications:  Current Outpatient Medications   Medication Sig Dispense Refill    atorvastatin (LIPITOR) 20 MG tablet Take 1 tablet (20 mg) by mouth daily 90 tablet 0    ferrous sulfate (FE TABS) 325 (65 Fe) MG EC tablet Take 1 tablet (325 mg) by mouth daily (Patient taking differently: Take 325 mg by mouth daily 2 tablets daily) 90 tablet 1    levothyroxine (SYNTHROID/LEVOTHROID) 112 MCG tablet Take 1 tablet (112  "mcg) by mouth daily 90 tablet 3    omeprazole (PRILOSEC) 40 MG DR capsule Take 1 capsule (40 mg) by mouth daily 60 capsule 0    vitamin D3 (CHOLECALCIFEROL) 50 mcg (2000 units) tablet Take 2 tablets (100 mcg) by mouth daily 200 tablet 3       No Known Allergies      EXAM:    BP (!) 144/78 (Cuff Size: Adult Large)   Pulse 60   Temp 97  F (36.1  C) (Tympanic)   Resp 16   Ht 1.626 m (5' 4\")   Wt 99.3 kg (219 lb)   LMP  (LMP Unknown)   SpO2 96%   BMI 37.59 kg/m      GENERAL:  Female, in no acute distress.  Alert and oriented x3. Well groomed.   HEENT:  Normocephalic, atraumatic. No conjunctival injection or eye swelling.   LUNGS:  Nonlabored breathing, no cough or audible wheezing, able to speak full sentences.  MSK: Full ROM UE.    SKIN: No rash on exposed skin.   NEURO: CN grossly intact, speech normal  PSYCH: Mentation appears normal, insight and judgement intact      Labs:    12/15/23 09:57   Ferritin 32   Iron 39   Iron Binding Capacity 317   Iron Sat Index 12 (L)   WBC 6.6   Hemoglobin 13.3   Hematocrit 40.6   Platelet Count 218   RBC Count 4.68   MCV 87   MCH 28.4   MCHC 32.8   RDW 16.3 (H)   % Neutrophils 66   % Lymphocytes 23   % Monocytes 7   % Eosinophils 3   % Basophils 0   Absolute Basophils 0.0   Absolute Eosinophils 0.2   Absolute Immature Granulocytes 0.0   Absolute Lymphocytes 1.6   Absolute Monocytes 0.5   % Immature Granulocytes 0   Absolute Neutrophils 4.4     Imaging: n/a    Impression/Plan: Jennifer Fallon is a 68 year old female with CAROLYN, currently on po iron.     CAROLYN: Labs consistent with CAROLYN on 8/16/2023, she started iron oral iron twice a day for 1 to 2 months, significant improvement of her anemia.  She is now on 1 tablet daily and her hemoglobin is 13.3, which is great.  EGD and colonoscopy with polyps, but otherwise unremarkable.  She has very minimal side effects now.  -Continue p.o. iron 1 tablet daily  -Dr. Avila and repeat CBC/iron studies in 6 months    BUENO: Secondary to CAROLYN, " hemoglobin improving, now resolved.    Hpylori: Diagnosed on EGD from 11/29/2023.  She has an appointment later today with GI/pharmacy to discuss treatment.  She is essentially asymptomatic, but we discussed diet changes as well.    Chart documentation with Dragon Voice recognition Software. Although reviewed after completion, some words and grammatical errors may remain.    30 minutes spent on the date of the encounter doing chart review, review of test results, interpretation of tests, patient visit, documentation, discussion with other provider(s), and discussion with family     Michell Fairchild PA-C  Hematology/Oncology  [facility]

## 2023-12-20 ENCOUNTER — VIRTUAL VISIT (OUTPATIENT)
Dept: GASTROENTEROLOGY | Facility: CLINIC | Age: 68
End: 2023-12-20
Attending: INTERNAL MEDICINE
Payer: COMMERCIAL

## 2023-12-20 DIAGNOSIS — K29.70 HELICOBACTER PYLORI GASTRITIS: Primary | ICD-10-CM

## 2023-12-20 DIAGNOSIS — B96.81 HELICOBACTER PYLORI GASTRITIS: Primary | ICD-10-CM

## 2023-12-20 DIAGNOSIS — D50.9 IRON DEFICIENCY ANEMIA, UNSPECIFIED IRON DEFICIENCY ANEMIA TYPE: ICD-10-CM

## 2023-12-20 RX ORDER — METRONIDAZOLE 250 MG/1
250 TABLET ORAL 4 TIMES DAILY
Qty: 56 TABLET | Refills: 0 | Status: SHIPPED | OUTPATIENT
Start: 2023-12-20 | End: 2024-02-15

## 2023-12-20 RX ORDER — DOXYCYCLINE HYCLATE 100 MG
100 TABLET ORAL 2 TIMES DAILY
Qty: 28 TABLET | Refills: 0 | Status: SHIPPED | OUTPATIENT
Start: 2023-12-20 | End: 2024-02-15

## 2023-12-20 RX ORDER — BISMUTH SUBSALICYLATE 262 MG/1
2 TABLET, CHEWABLE ORAL
Qty: 112 TABLET | Refills: 0 | Status: SHIPPED | OUTPATIENT
Start: 2023-12-20 | End: 2024-02-15

## 2023-12-20 NOTE — Clinical Note
12/20/2023         RE: Jennifer Fallon  [address] [address]        Dear Colleague,    Thank you for referring your patient, Jennifer Fallon, to the [facility]. Please see a copy of my visit note below.    Medication Therapy Management (MTM) Encounter    ASSESSMENT:                            Medication Adherence/Access: No issues identified    H pylori:       PLAN:                            Would recommend bismuth quadruple therapy x 14 days for treatment of H pylori:  Omeprazole 40 mg daily   Pepto bismol 524 mg by mouth four times daily  Doxycycline 100 mg by mouth twice daily  Metronidazole 250 mg by mouth four times daily     Take iron mid-day during H pylori treatment to minimize interaction with doxycycline.    Follow-up:    -- 1 week for check-in (12/28 at 11:30 AM for a telephone call)   -- at least four weeks after treatment for eradication     SUBJECTIVE/OBJECTIVE:                          Jennifer Fallon is a 68 year old female called for an initial visit. She was referred to me from Dr. Reyes.     [id number] (first portion) [id number] (second portion)    Reason for visit: H pylori treatment     Allergies/ADRs: None  Tobacco: She reports that she has never smoked. She has never used smokeless tobacco.  Alcohol: not currently using    Medication Adherence/Access: no issues reported    H pylori:   Omeprazole 40 mg daily     Jennifer had a recent scope with Dr. Reyes which was positive for H pylori per biopsies. She is not having any stomach pain.        Today's Vitals: LMP  (LMP Unknown)   ----------------  {CONNIE?:977243}    I spent 35 minutes with this patient today. All changes were made via collaborative practice agreement with Hayder Reyes. A copy of the visit note was provided to the patient's provider(s).    A summary of these recommendations was declined by the patient.    Kaycee Conrad PharmD, BCACP  MTM Pharmacist   [facility]  [facility]   Phone: [phone]    Telemedicine Visit Details  Type of service:  Telephone visit  Start Time:  12:32 PM  End Time: 1:07 PM     Medication Therapy Recommendations  No medication therapy recommendations to display     Medication Therapy Management (MTM) Encounter    ASSESSMENT:                            Medication Adherence/Access: No issues identified    H pylori: Jennifer would benefit from H pylori treatment which was discussed in depth with her today. Agree with Dr. Reyes's recommendation for bismuth quadruple therapy. We discussed dosing omeprazole 20 mg twice daily versus 40 mg daily. She was agreeable to continuing omeprazole 40 mg daily, which is reasonable. She would benefit from eradication testing at least four weeks after treatment, which we can plan to do via stool antigen. It would be best to hold PPI treatment for the two weeks prior to re-testing to minimize potential for false negative.    Anemia: Improved. H pylori treatment will likely help with this as well. We discussed interaction between iron and doxycycline. Would encourage her to take iron mid-day to avoid potential interaction.    PLAN:                            Would recommend bismuth quadruple therapy x 14 days for treatment of H pylori:  Omeprazole 40 mg daily   Pepto bismol 524 mg by mouth four times daily  Doxycycline 100 mg by mouth twice daily  Metronidazole 250 mg by mouth four times daily     Take iron mid-day during H pylori treatment to minimize interaction with doxycycline.    Follow-up:    -- 1 week for check-in (12/28 at 11:30 AM for a telephone call)   -- at least four weeks after treatment for eradication     SUBJECTIVE/OBJECTIVE:                          Jennifer Fallon is a 68 year old female called for an initial visit. She was referred to me from Dr. Reyes.     ID [id number] (first portion) and ID [id number] (second portion). Visit required two Kyrgyz interpreters  since we had a poor connection with the first.    Reason for visit: H pylori treatment     Allergies/ADRs: None  Tobacco: She reports that she has never smoked. She has never used smokeless tobacco.  Alcohol: not currently using    Medication Adherence/Access: no issues reported    H pylori:   Omeprazole 40 mg daily     Jennifer had a recent scope with Dr. Reyes which was positive for H pylori per biopsies. She is not having any stomach pain after starting the omeprazole. Not familiar with H pylori. Does feel she would be able to take medications four times daily during treatment.    Liver Function Studies -   Recent Labs   Lab Test 08/18/23  1238   PROTTOTAL 7.3   ALBUMIN 4.2   BILITOTAL 0.5   ALKPHOS 100   AST 32   ALT 21       GFR Estimate   Date Value Ref Range Status   08/18/2023 >90 >60 mL/min/1.73m2 Final   06/01/2021 >90 >60 mL/min/[1.73_m2] Final     Comment:     Non  GFR Calc  Starting 12/18/2018, serum creatinine based estimated GFR (eGFR) will be   calculated using the Chronic Kidney Disease Epidemiology Collaboration   (CKD-EPI) equation.       GFR Estimate If Black   Date Value Ref Range Status   06/01/2021 >90 >60 mL/min/[1.73_m2] Final     Comment:      GFR Calc  Starting 12/18/2018, serum creatinine based estimated GFR (eGFR) will be   calculated using the Chronic Kidney Disease Epidemiology Collaboration   (CKD-EPI) equation.          Anemia:   Ferrous sulfate 325 mg daily     Follows with heme/onc. Last visit was 12/18/23. Notes her symptoms were much improved with starting iron.    Hemoglobin   Date Value Ref Range Status   12/15/2023 13.3 11.7 - 15.7 g/dL Final   09/20/2023 11.2 (L) 11.7 - 15.7 g/dL Final   06/01/2021 10.0 (L) 11.7 - 15.7 g/dL Final   02/03/2021 10.9 (L) 11.7 - 15.7 g/dL Final       Today's Vitals: LMP  (LMP Unknown)   ----------------    I spent 35 minutes with this patient today. All changes were made via collaborative practice agreement with  Hayder Reyes. A copy of the visit note was provided to the patient's provider(s).    A summary of these recommendations was declined by the patient.    Kaycee Conrad PharmD, BCACP  MTM Pharmacist   [facility]   Phone: [phone]    Telemedicine Visit Details  Type of service:  Telephone visit  Start Time:  12:32 PM  End Time: 1:07 PM     Medication Therapy Recommendations  No medication therapy recommendations to display       Again, thank you for allowing me to participate in the care of your patient.        Sincerely,        Kaycee Conrad [facility]

## 2023-12-20 NOTE — PROGRESS NOTES
Medication Therapy Management (MTM) Encounter    ASSESSMENT:                            Medication Adherence/Access: No issues identified    H pylori: Jennifer would benefit from H pylori treatment which was discussed in depth with her today. Agree with Dr. Reyes's recommendation for bismuth quadruple therapy. We discussed dosing omeprazole 20 mg twice daily versus 40 mg daily. She was agreeable to continuing omeprazole 40 mg daily, which is reasonable. She would benefit from eradication testing at least four weeks after treatment, which we can plan to do via stool antigen. It would be best to hold PPI treatment for the two weeks prior to re-testing to minimize potential for false negative.    Anemia: Improved. H pylori treatment will likely help with this as well. We discussed interaction between iron and doxycycline. Would encourage her to take iron mid-day to avoid potential interaction.    PLAN:                            Would recommend bismuth quadruple therapy x 14 days for treatment of H pylori:  Omeprazole 40 mg daily   Pepto bismol 524 mg by mouth four times daily  Doxycycline 100 mg by mouth twice daily  Metronidazole 250 mg by mouth four times daily     Take iron mid-day during H pylori treatment to minimize interaction with doxycycline.    Follow-up:    -- 1 week for check-in (12/28 at 11:30 AM for a telephone call)   -- at least four weeks after treatment for eradication     EDUCATION:                            We reviewed H pylori today including background information, indications for treatment and potential modes of transmission. We also discussed potential for antibiotic resistance and importance of finishing treatment if able, as well as follow-up testing. Discussed bismuth quadruple therapy today including medications, dosing, side effects, precautions and general counseling information. Discussed the potential for bismuth to cause dark tongue/stools and the potential interaction between  metronidazole and alcohol. Reviewed spacing of supplements from tetracycline therapy. Contact information provided in the event they have questions or concerns related to treatment.    SUBJECTIVE/OBJECTIVE:                          Jennifer Fallon is a 68 year old female called for an initial visit. She was referred to me from Dr. Reyes.     ID [id number] (first portion) and ID [id number] (second portion). Visit required two Czech interpreters since we had a poor connection with the first.    Reason for visit: H pylori treatment     Allergies/ADRs: None  Tobacco: She reports that she has never smoked. She has never used smokeless tobacco.  Alcohol: not currently using    Medication Adherence/Access: no issues reported    H pylori:   Omeprazole 40 mg daily     Jennifer had a recent scope with Dr. Reyes which was positive for H pylori per biopsies. She is not having any stomach pain after starting the omeprazole. Not familiar with H pylori. Does feel she would be able to take medications four times daily during treatment.    Liver Function Studies -   Recent Labs   Lab Test 08/18/23  1238   PROTTOTAL 7.3   ALBUMIN 4.2   BILITOTAL 0.5   ALKPHOS 100   AST 32   ALT 21       GFR Estimate   Date Value Ref Range Status   08/18/2023 >90 >60 mL/min/1.73m2 Final   06/01/2021 >90 >60 mL/min/[1.73_m2] Final     Comment:     Non  GFR Calc  Starting 12/18/2018, serum creatinine based estimated GFR (eGFR) will be   calculated using the Chronic Kidney Disease Epidemiology Collaboration   (CKD-EPI) equation.       GFR Estimate If Black   Date Value Ref Range Status   06/01/2021 >90 >60 mL/min/[1.73_m2] Final     Comment:      GFR Calc  Starting 12/18/2018, serum creatinine based estimated GFR (eGFR) will be   calculated using the Chronic Kidney Disease Epidemiology Collaboration   (CKD-EPI) equation.          Anemia:   Ferrous sulfate 325 mg daily     Follows with heme/onc. Last visit  was 12/18/23. Notes her symptoms were much improved with starting iron.    Hemoglobin   Date Value Ref Range Status   12/15/2023 13.3 11.7 - 15.7 g/dL Final   09/20/2023 11.2 (L) 11.7 - 15.7 g/dL Final   06/01/2021 10.0 (L) 11.7 - 15.7 g/dL Final   02/03/2021 10.9 (L) 11.7 - 15.7 g/dL Final       Today's Vitals: LMP  (LMP Unknown)   ----------------    I spent 35 minutes with this patient today. All changes were made via collaborative practice agreement with Hayder Reyes. A copy of the visit note was provided to the patient's provider(s).    A summary of these recommendations was declined by the patient.    Kaycee Conrad PharmD, BCACP  MTM Pharmacist   [facility]   Phone: [phone]    Telemedicine Visit Details  Type of service:  Telephone visit  Start Time:  12:32 PM  End Time: 1:07 PM     Medication Therapy Recommendations  Helicobacter pylori gastritis    Rationale: Untreated condition - Needs additional medication therapy - Indication   Recommendation: Start Medication - Pepto-Bismol 262 MG Chew   Status: Accepted per CPA

## 2023-12-28 ENCOUNTER — VIRTUAL VISIT (OUTPATIENT)
Dept: GASTROENTEROLOGY | Facility: CLINIC | Age: 68
End: 2023-12-28
Attending: INTERNAL MEDICINE
Payer: COMMERCIAL

## 2023-12-28 DIAGNOSIS — K29.70 HELICOBACTER PYLORI GASTRITIS: Primary | ICD-10-CM

## 2023-12-28 DIAGNOSIS — B96.81 HELICOBACTER PYLORI GASTRITIS: Primary | ICD-10-CM

## 2023-12-28 NOTE — PROGRESS NOTES
Medication Therapy Management (MTM) Encounter    ASSESSMENT:                            Medication Adherence/Access: No issues identified    H pylori: Jennifer would benefit from continuing current medications for treatment of H pylori as directed. Discussed that stomach upset/soreness could be from medications or H pylori itself. Encouraged continued fluids/food with administration. She can take acetaminophen for body aches if needed, as well as lime/honey that she has been using. Reviewed interactions between treatment and Sreeer. If she is able to complete treatment as directed, we discussed re-testing in early February. She should be done with antibiotic treatment the first week of January, and then will have a couple more week of omeprazole treatment. We will aim for re-testing about two weeks after that.    PLAN:                            Continue with current medications for H pylori treatment as previously directed.  Will plan on eradication testing (via stool antigen) on February 5th or after.    Follow-up:    - as needed after stool testing    SUBJECTIVE/OBJECTIVE:                          Jennifer Fallon is a 68 year old female called for a follow-up visit from 12/20/2023.     The visit was held with an interprter ID [id number] ().    Reason for visit: H pylori treatment follow-up     Allergies/ADRs: None  Tobacco: She reports that she has never smoked. She has never used smokeless tobacco.  Alcohol: not currently using    Medication Adherence/Access: no issues reported    H pylori:   Omeprazole 40 mg daily  Doxycycline 100 mg twice daily   Metronidazole 250 mg four times daily   Pepto bismol 524 mg four times daily     Notes she started medications on Thursday or Friday last week. Thinks this is going okay. She did start the medications, but works overnight so she does take the last dose around 10 PM and hopes that's okay. She does feel some stomach pain at night, and is wondering  if this is from the medication. Notes she got a cough/cold and runny nose. She is wondering what she can take for pain. Also wondering about Elisabet [place] and honey. She confirms that she is taking each of the medications described above. No other questions or concerns today.    Today's Vitals: LMP  (LMP Unknown)   ----------------    I spent 18 minutes with this patient today. All changes were made via collaborative practice agreement with Dr. Reyes. A copy of the visit note was provided to the patient's provider(s).    A summary of these recommendations was declined by the patient.    Kaycee Conrad PharmD, BCACP  MTM Pharmacist   [facility]   Phone: [phone]    Telemedicine Visit Details  Type of service:  Telephone visit  Start Time:  11:30 AM  End Time: 11:48 AM     Medication Therapy Recommendations  No medication therapy recommendations to display

## 2023-12-28 NOTE — Clinical Note
12/28/2023         RE: Jennifer Fallon  [address] [address]        Dear Colleague,    Thank you for referring your patient, Jennifer Fallon, to the [facility]. Please see a copy of my visit note below.    Medication Therapy Management (MTM) Encounter    ASSESSMENT:                            Medication Adherence/Access: {adherencechoices:166462}    ***:   ***    PLAN:                            ***    Follow-up: {followuptest2:705570}    SUBJECTIVE/OBJECTIVE:                          Jennifer Fallon is a 68 year old female called for a follow-up visit from 12/20/2023. {mtmvisitdetails:039459}    The visit was held with an interprter ID [id number] ().    Reason for visit: ***.    Allergies/ADRs: {1/2/3/4/5:137215}  Past Medical History: {1/2/3/4/5:338079}  Tobacco: She reports that she has never smoked. She has never used smokeless tobacco.  Alcohol: {ALCOHOL CONSUMPTION HX:245979}  {Social and Goals:673713}  Medication Adherence/Access: {fumedadherence:445554}    {MTM SUBJECTIVE (Optional):242086}        ***:   ***    Today's Vitals: LMP  (LMP Unknown)   ----------------  {CONNIE?:241391}    I spent {mtm total time 3:489556} with this patient today{MTMpartdbillingquestion:258328}. { :478142}. A copy of the visit note was provided to the patient's provider(s).    A summary of these recommendations {GIVEN/NOT GIVEN:115038}.    ***    Telemedicine Visit Details  Type of service:  Telephone visit  Start Time: {video/phone visit start time:152948}  End Time: {video/phone visit end time:152948}     Medication Therapy Recommendations  No medication therapy recommendations to display     Medication Therapy Management (MTM) Encounter    ASSESSMENT:                            Medication Adherence/Access: No issues identified    H pylori: Jennifer would benefit from continuing current medications for treatment of H pylori as directed. Discussed that stomach  upset/soreness could be from medications or H pylori itself. Encouraged continued fluids/food with administration. She can take acetaminophen for body aches if needed, as well as lime/honey that she has been using. Reviewed interactions between treatment and [place]isabet[place]. If she is able to complete treatment as directed, we discussed re-testing in early February. She should be done with antibiotic treatment the first week of January, and then will have a couple more week of omeprazole treatment. We will aim for re-testing about two weeks after that.    PLAN:                            Continue with current medications for H pylori treatment as previously directed.  Will plan on eradication testing (via stool antigen) on February 5th or after.    Follow-up:    - as needed after stool testing    SUBJECTIVE/OBJECTIVE:                          Jennifer Fallon is a 68 year old female called for a follow-up visit from 12/20/2023.     The visit was held with an interprter ID [id number] ().    Reason for visit: H pylori treatment follow-up     Allergies/ADRs: None  Tobacco: She reports that she has never smoked. She has never used smokeless tobacco.  Alcohol: not currently using    Medication Adherence/Access: no issues reported    H pylori:   Omeprazole 40 mg daily  Doxycycline 100 mg twice daily   Metronidazole 250 mg four times daily   Pepto bismol 524 mg four times daily     Notes she started medications on Thursday or Friday last week. Thinks this is going okay. She did start the medications, but works overnight so she does take the last dose around 10 PM and hopes that's okay. She does feel some stomach pain at night, and is wondering if this is from the medication. Notes she got a cough/cold and runny nose. She is wondering what she can take for pain. Also wondering about Elisabet [place] and honey. She confirms that she is taking each of the medications described above. No other questions or concerns  today.    Today's Vitals: LMP  (LMP Unknown)   ----------------    I spent 18 minutes with this patient today. All changes were made via collaborative practice agreement with Dr. Reyes. A copy of the visit note was provided to the patient's provider(s).    A summary of these recommendations was sent via [employer].    Kaycee Conrad PharmD, BCACP  MTM Pharmacist   [facility]   Phone: [phone]    Telemedicine Visit Details  Type of service:  Telephone visit  Start Time:  11:30 AM  End Time: 11:48 AM     Medication Therapy Recommendations  No medication therapy recommendations to display       Again, thank you for allowing me to participate in the care of your patient.        Sincerely,        Kaycee Conrad RP

## 2024-01-05 ENCOUNTER — TELEPHONE (OUTPATIENT)
Dept: GASTROENTEROLOGY | Facility: CLINIC | Age: 69
End: 2024-01-05

## 2024-01-05 NOTE — TELEPHONE ENCOUNTER
Spoke with Jennifer with the help of a .     Stool collection to be completed after feb 5th. Order is in     Instructed to pick a collection container form the closest  lab, stool into the container then return the container to the lab     Verbalized understanding

## 2024-01-05 NOTE — TELEPHONE ENCOUNTER
[facility]    Phone Message    May a detailed message be left on voicemail: yes     Reason for Call: Other: Patient calling in to find out about next steps for lab testing.Please call patient ASAP.      Action Taken: Message routed to:  [facility] ([facility]    Travel Screening: Not Applicable

## 2024-02-05 ENCOUNTER — LAB (OUTPATIENT)
Dept: LAB | Facility: CLINIC | Age: 69
End: 2024-02-05
Payer: COMMERCIAL

## 2024-02-05 DIAGNOSIS — K29.70 HELICOBACTER PYLORI GASTRITIS: ICD-10-CM

## 2024-02-05 DIAGNOSIS — B96.81 HELICOBACTER PYLORI GASTRITIS: ICD-10-CM

## 2024-02-06 PROCEDURE — 87338 HPYLORI STOOL AG IA: CPT

## 2024-02-08 LAB — H PYLORI AG STL QL IA: POSITIVE

## 2024-02-09 ENCOUNTER — TELEPHONE (OUTPATIENT)
Dept: GASTROENTEROLOGY | Facility: CLINIC | Age: 69
End: 2024-02-09
Payer: COMMERCIAL

## 2024-02-09 NOTE — RESULT ENCOUNTER NOTE
Discussed results via  with recommendation for re-treatment. Scheduled for return MTM visit on 2/15 to discuss.

## 2024-02-09 NOTE — TELEPHONE ENCOUNTER
Called to discuss H pylori results and recommendation for alternative treatment. Scheduled MTM return for 11:30 AM on 2/15 to discuss.

## 2024-02-15 ENCOUNTER — VIRTUAL VISIT (OUTPATIENT)
Dept: GASTROENTEROLOGY | Facility: CLINIC | Age: 69
End: 2024-02-15
Attending: INTERNAL MEDICINE
Payer: COMMERCIAL

## 2024-02-15 ENCOUNTER — APPOINTMENT (OUTPATIENT)
Dept: INTERPRETER SERVICES | Facility: CLINIC | Age: 69
End: 2024-02-15
Payer: COMMERCIAL

## 2024-02-15 DIAGNOSIS — K29.70 HELICOBACTER PYLORI GASTRITIS: Primary | ICD-10-CM

## 2024-02-15 DIAGNOSIS — B96.81 HELICOBACTER PYLORI GASTRITIS: Primary | ICD-10-CM

## 2024-02-15 RX ORDER — LEVOFLOXACIN 500 MG/1
500 TABLET, FILM COATED ORAL DAILY
Qty: 14 TABLET | Refills: 0 | Status: SHIPPED | OUTPATIENT
Start: 2024-02-15 | End: 2024-02-19

## 2024-02-15 RX ORDER — AMOXICILLIN 250 MG/1
750 CAPSULE ORAL 3 TIMES DAILY
Qty: 126 CAPSULE | Refills: 0 | Status: SHIPPED | OUTPATIENT
Start: 2024-02-15 | End: 2024-05-28

## 2024-02-15 NOTE — Clinical Note
2/15/2024         RE: Jennifer Fallon  [address]  [facility] [address]        Dear Colleague,    Thank you for referring your patient, Jennifer Fallon, to the [facility]. Please see a copy of my visit note below.    Medication Therapy Management (MTM) Encounter    ASSESSMENT:                            Medication Adherence/Access: No issues identified    H pylori:     PLAN:                            Would recommend levofloxacin triple therapy x 14 days as salvage treatment for H pylori:  -- esomeprazole 20 mg by mouth twice daily  -- amoxicillin 750 mg by mouth three times daily  -- levofloxacin 500 mg by mouth daily     Orders placed to [facility] in [place] per patient preference    Follow-up:    -- 2/22 at 11:30 AM for a telephone     SUBJECTIVE/OBJECTIVE:                          Jennifer Fallon is a 68 year old female called for a follow-up visit from 12/28/2023.       Reason for visit: H pylori - re-treatment    Allergies/ADRs: None  Tobacco: She reports that she has never smoked. She has never used smokeless tobacco.  Alcohol: not currently using    Medication Adherence/Access: no issues reported  -- taking iron, thyroid, and vitamin D3     H pylori:     Notes that before treatment she did not have any acid reflux, but now notices this bothers her more. Works from 5 PM-1AM. Notes she has five others in her household.     Lab Results   Component Value Date    AST 32 08/18/2023    AST 18 06/01/2021     Lab Results   Component Value Date    ALT 21 08/18/2023    ALT 29 06/01/2021     Lab Results   Component Value Date    BILICONJ 0.0 05/26/2006      Lab Results   Component Value Date    BILITOTAL 0.5 08/18/2023    BILITOTAL 0.3 06/01/2021     Lab Results   Component Value Date    ALBUMIN 4.2 08/18/2023    ALBUMIN 3.4 06/01/2021     Lab Results   Component Value Date    PROTTOTAL 7.3 08/18/2023    PROTTOTAL 7.4 06/01/2021      Lab Results   Component Value Date     ALKPHOS 100 08/18/2023    ALKPHOS 87 06/01/2021       Estimated Creatinine Clearance: 107.7 mL/min (based on SCr of 0.58 mg/dL).      Today's Vitals: LMP  (LMP Unknown)   ----------------    I spent 23 minutes with this patient today. All changes were made via collaborative practice agreement with Dr. Reyes. A copy of the visit note was provided to the patient's provider(s).    A summary of these recommendations was declined by the patient.    Kaycee AmayaD, BCACP  MTM Pharmacist   [facility]   Phone: [phone]    Telemedicine Visit Details  Type of service:  Telephone visit  Start Time:  11:30 AM  End Time: 11:56 AM     Medication Therapy Recommendations  No medication therapy recommendations to display     Medication Therapy Management (MTM) Encounter    ASSESSMENT:                            Medication Adherence/Access: No issues identified    H pylori: Discussed recommendation to pursue re-treatment. Reviewed consideration for household testing  given persistently positive result. Reviewed ways to minimize transmission. It is likely that she had an increase in symptoms because she completed the two month course of omeprazole. We discussed that it is likely her symptoms are attributable to H pylori.    Would recommend levofloxacin triple therapy as salvage regimen, which was discussed in detail including medications, mechanism of action, dosing, side effects and precautions. Discussed interaction between levofloxacin and iron, and encouraged her to take these at a separate time of day. Liver and kidney function normal, however, discussed risks of fluoroquinolone risk including tendonitis/tendon rupture and monitoring for signs/symptoms of this. Encouraged her to contact us if she is unable to get the medications as prescribed. Would recommend eradication testing at least four weeks after treatment completion.    PLAN:                            Would recommend levofloxacin  triple therapy x 14 days as salvage treatment for H pylori:  -- esomeprazole 20 mg by mouth twice daily  -- amoxicillin 750 mg by mouth three times daily  -- levofloxacin 500 mg by mouth daily     Orders placed to [facility] in [place] per patient preference    Follow-up:    -- 2/22 at 11:30 AM for a telephone call    SUBJECTIVE/OBJECTIVE:                          Jennifer Fallon is a 68 year old female called for a follow-up visit from 12/28/2023.     An [employer] was used for today's visit.    Reason for visit: H pylori - re-treatment    Allergies/ADRs: None  Tobacco: She reports that she has never smoked. She has never used smokeless tobacco.  Alcohol: not currently using    Medication Adherence/Access: no issues reported  -- taking iron, thyroid, and vitamin D3     H pylori:     Jennifer had an EGD at the end of November 2023 which was positive for H pylori. She was then treated with bismuth quadruple therapy and was able to complete the entire treatment. She completed eradication testing in early February 2024, which remained positive. Notes that before treatment she did not have any acid reflux, but now notices this bothers her more. Works from 5 PM-1AM and states by the end of the shift, when she has not done anything for this it bothers her the most. Notes she has five others in her household.     Lab Results   Component Value Date    AST 32 08/18/2023    AST 18 06/01/2021     Lab Results   Component Value Date    ALT 21 08/18/2023    ALT 29 06/01/2021     Lab Results   Component Value Date    BILICONJ 0.0 05/26/2006      Lab Results   Component Value Date    BILITOTAL 0.5 08/18/2023    BILITOTAL 0.3 06/01/2021     Lab Results   Component Value Date    ALBUMIN 4.2 08/18/2023    ALBUMIN 3.4 06/01/2021     Lab Results   Component Value Date    PROTTOTAL 7.3 08/18/2023    PROTTOTAL 7.4 06/01/2021      Lab Results   Component Value Date    ALKPHOS 100 08/18/2023    ALKPHOS 87  06/01/2021       Estimated Creatinine Clearance: 107.7 mL/min (based on SCr of 0.58 mg/dL).    Today's Vitals: LMP  (LMP Unknown)   ----------------    I spent 23 minutes with this patient today. All changes were made via collaborative practice agreement with Dr. Reyes. A copy of the visit note was provided to the patient's provider(s).    A summary of these recommendations was declined by the patient.    Kaycee Conrad PharmD, BCACP  MTM Pharmacist   [facility]   Phone: [phone]    Telemedicine Visit Details  Type of service:  Telephone visit  Start Time:  11:30 AM  End Time: 11:56 AM     Medication Therapy Recommendations  No medication therapy recommendations to display       Again, thank you for allowing me to participate in the care of your patient.        Sincerely,        Kaycee Conrad [facility]

## 2024-02-15 NOTE — PROGRESS NOTES
Medication Therapy Management (MTM) Encounter    ASSESSMENT:                            Medication Adherence/Access: No issues identified    H pylori: Discussed recommendation to pursue re-treatment. Reviewed consideration for household testing  given persistently positive result. Reviewed ways to minimize transmission. It is likely that she had an increase in symptoms because she completed the two month course of omeprazole. We discussed that it is likely her symptoms are attributable to H pylori.    Would recommend levofloxacin triple therapy as salvage regimen, which was discussed in detail including medications, mechanism of action, dosing, side effects and precautions. Discussed interaction between levofloxacin and iron, and encouraged her to take these at a separate time of day. Liver and kidney function normal, however, discussed risks of fluoroquinolone risk including tendonitis/tendon rupture and monitoring for signs/symptoms of this. Encouraged her to contact us if she is unable to get the medications as prescribed. Would recommend eradication testing at least four weeks after treatment completion.    PLAN:                            Would recommend levofloxacin triple therapy x 14 days as salvage treatment for H pylori:  -- esomeprazole 20 mg by mouth twice daily  -- amoxicillin 750 mg by mouth three times daily  -- levofloxacin 500 mg by mouth daily     Orders placed to [facility] per patient preference    Follow-up:    -- 2/22 at 11:30 AM for a telephone call    SUBJECTIVE/OBJECTIVE:                          Jennifer Fallon is a 68 year old female called for a follow-up visit from 12/28/2023.     An [employer] interpreter was used for today's visit.    Reason for visit: H pylori - re-treatment    Allergies/ADRs: None  Tobacco: She reports that she has never smoked. She has never used smokeless tobacco.  Alcohol: not currently using    Medication Adherence/Access: no issues  reported  -- taking iron, thyroid, and vitamin D3     H pylori:     Jennifer had an EGD at the end of November 2023 which was positive for H pylori. She was then treated with bismuth quadruple therapy and was able to complete the entire treatment. She completed eradication testing in early February 2024, which remained positive. Notes that before treatment she did not have any acid reflux, but now notices this bothers her more. Works from 5 PM-1AM and states by the end of the shift, when she has not done anything for this it bothers her the most. Notes she has five others in her household.     Lab Results   Component Value Date    AST 32 08/18/2023    AST 18 06/01/2021     Lab Results   Component Value Date    ALT 21 08/18/2023    ALT 29 06/01/2021     Lab Results   Component Value Date    BILICONJ 0.0 05/26/2006      Lab Results   Component Value Date    BILITOTAL 0.5 08/18/2023    BILITOTAL 0.3 06/01/2021     Lab Results   Component Value Date    ALBUMIN 4.2 08/18/2023    ALBUMIN 3.4 06/01/2021     Lab Results   Component Value Date    PROTTOTAL 7.3 08/18/2023    PROTTOTAL 7.4 06/01/2021      Lab Results   Component Value Date    ALKPHOS 100 08/18/2023    ALKPHOS 87 06/01/2021       Estimated Creatinine Clearance: 107.7 mL/min (based on SCr of 0.58 mg/dL).    Today's Vitals: LMP  (LMP Unknown)   ----------------    I spent 23 minutes with this patient today. All changes were made via collaborative practice agreement with Dr. Reyes. A copy of the visit note was provided to the patient's provider(s).    A summary of these recommendations was declined by the patient.    Kaycee AmayaD, BCACP  MTM Pharmacist   [facility]   Phone: [phone]    Telemedicine Visit Details  Type of service:  Telephone visit  Start Time:  11:30 AM  End Time: 11:56 AM     Medication Therapy Recommendations  Helicobacter pylori gastritis    Rationale: Condition refractory to medication - Ineffective  medication - Effectiveness   Recommendation: Change Medication - amoxicillin 250 MG capsule   Status: Accepted per CPA

## 2024-02-19 ENCOUNTER — TELEPHONE (OUTPATIENT)
Dept: GASTROENTEROLOGY | Facility: CLINIC | Age: 69
End: 2024-02-19
Payer: COMMERCIAL

## 2024-02-19 ENCOUNTER — APPOINTMENT (OUTPATIENT)
Dept: INTERPRETER SERVICES | Facility: CLINIC | Age: 69
End: 2024-02-19
Payer: COMMERCIAL

## 2024-02-19 DIAGNOSIS — K29.70 HELICOBACTER PYLORI GASTRITIS: ICD-10-CM

## 2024-02-19 DIAGNOSIS — B96.81 HELICOBACTER PYLORI GASTRITIS: ICD-10-CM

## 2024-02-19 RX ORDER — LEVOFLOXACIN 500 MG/1
500 TABLET, FILM COATED ORAL DAILY
Qty: 14 TABLET | Refills: 0 | Status: SHIPPED | OUTPATIENT
Start: 2024-02-19 | End: 2024-05-28

## 2024-02-19 NOTE — TELEPHONE ENCOUNTER
Jennifer's son called and left a message saying she could not get the last medication, it will not be ready until Thursday which is the day of our visit.    Returned call with an [facility] . Notes she got amoxicillin and esomeprazole. Notes they did not give her the last medication.     Unsure if she should start taking them now, or wait until she has all three. She is wondering if we can send to another pharmacy who may have them in stock. [facility] in [facility] and [facility] in [place].    Discussed that she should wait until she has all the medication to start this. I can re-order levofloxacin to the [facility] in [place]. Will re-schedule appointment to 2/26 at 2 PM. She will call me if she has any difficulty getting the last medication.

## 2024-02-19 NOTE — TELEPHONE ENCOUNTER
I contacted [facility] to clarify the status of her medications. They note that she picked up the levofloxacin and the amoxicillin on 2/16 and then the esomeprazole was sold today (2/19). I called back to clarify.    She says she only has the amoxicillin from Friday and then got the esomeprazole today. She will pickup the levofloxacin from [facility].

## 2024-02-26 ENCOUNTER — VIRTUAL VISIT (OUTPATIENT)
Dept: GASTROENTEROLOGY | Facility: CLINIC | Age: 69
End: 2024-02-26
Attending: INTERNAL MEDICINE
Payer: COMMERCIAL

## 2024-02-26 DIAGNOSIS — K29.70 HELICOBACTER PYLORI GASTRITIS: Primary | ICD-10-CM

## 2024-02-26 DIAGNOSIS — B96.81 HELICOBACTER PYLORI GASTRITIS: Primary | ICD-10-CM

## 2024-02-26 NOTE — Clinical Note
2/26/2024         RE: Quoc Fallon  [address] [address]        Dear Colleague,    Thank you for referring your patient, Quoc Fallon, to the [facility]. Please see a copy of my visit note below.    Medication Therapy Management (MTM) Encounter    ASSESSMENT:                            Medication Adherence/Access: {adherencechoices:289329}    ***:   ***    PLAN:                            ***    Follow-up: {followuptest2:454068}    SUBJECTIVE/OBJECTIVE:                          Quoc Fallon is a 68 year old female called for a follow-up visit from 2/15/2023.     Visit held with  ID [id number].    Reason for visit: H pylori follow-up    Allergies/ADRs: {1/2/3/4/5:147315}  Past Medical History: {1/2/3/4/5:068417}  Tobacco: She reports that she has never smoked. She has never used smokeless tobacco.  Alcohol: {ALCOHOL CONSUMPTION HX:591041}  {Social and Goals:828400}  Medication Adherence/Access: {fumedadherence:294288}    {MTM SUBJECTIVE (Optional):609028}    H pylori:   Esomeprazole 20 mg twice daily   Amoxicillin 750 mg three times daily   Levofloxacin 500 mg daily         Copied Forward:  Jennifer had an EGD at the end of November 2023 which was positive for H pylori. She was then treated with bismuth quadruple therapy and was able to complete the entire treatment. She completed eradication testing in early February 2024, which remained positive. Notes that before treatment she did not have any acid reflux, but now notices this bothers her more. Works from 5 PM-1AM and states by the end of the shift, when she has not done anything for this it bothers her the most. Notes she has five others in her household.        Today's Vitals: LMP  (LMP Unknown)   ----------------  {CONNIE?:791617}    I spent {mtm total time 3:562857} with this patient today{MTMpartdbillingquestion:996156}. { :713350}. A copy of the visit note was provided  to the patient's provider(s).    A summary of these recommendations {GIVEN/NOT GIVEN:484409}.    Kaycee Conrad PharmD, BCACP  MTM Pharmacist   [facility]   Phone: [phone]    Telemedicine Visit Details  Type of service:  Telephone visit  Start Time:  2:00  PM  End Time: {video/phone visit end time:723314}     Medication Therapy Recommendations  No medication therapy recommendations to display     Medication Therapy Management (MTM) Encounter    ASSESSMENT:                            Medication Adherence/Access: No issues identified    H pylori: Jennifer would benefit from taking H pylori salvage regimen as directed. She would benefit from eradication testing at least four weeks after she completed treatment (testing around the beginning of April). It seems like she has had some symptomatic improvement, which is a positive sign. She has follow-up scheduled with the GI team, shortly before she is due for eradication testing.    PLAN:                            Jennifer to complete H pylori treatment as directed.  Will plan on doing H pylori re-testing on April 2nd or after.    Follow-up:    -- on 4/2 or after for eradication testing (via stool antigen)   -- with Agnieszka Dumont PA-C as planned    SUBJECTIVE/OBJECTIVE:                          Quoc Fallon is a 68 year old female called for a follow-up visit from 2/15/2023.     Our visit held with  ID [id number].    Reason for visit: H pylori follow-up    Allergies/ADRs: None  Tobacco: She reports that she has never smoked. She has never used smokeless tobacco.  Alcohol: not currently using    Medication Adherence/Access: no issues reported    H pylori:   Esomeprazole 20 mg twice daily   Amoxicillin 750 mg three times daily   Levofloxacin 500 mg daily     Notes that everything seems fine. Notes that she hasn't had symptoms in a few days. Denies heartburn or upper abdominal symptoms. Notes she started these last week, she  thinks Tuesday but maybe Wednesday. Confirms she is about half-way through treatment today. No questions or concerns for me today. Confirms medication directions.    Copied Forward:  Jennifer had an EGD at the end of November 2023 which was positive for H pylori. She was then treated with bismuth quadruple therapy and was able to complete the entire treatment. She completed eradication testing in early February 2024, which remained positive. Notes that before treatment she did not have any acid reflux, but now notices this bothers her more. Works from 5 PM-1AM and states by the end of the shift, when she has not done anything for this it bothers her the most. Notes she has five others in her household.      Today's Vitals: LMP  (LMP Unknown)   ----------------    I spent 7 minutes with this patient today. All changes were made via collaborative practice agreement with Hayder Reyes. A copy of the visit note was provided to the patient's provider(s).    A summary of these recommendations was declined by the patient.    Kaycee Conrad PharmD, BCACP  MTM Pharmacist   [facility]   Phone: [phone]    Telemedicine Visit Details  Type of service:  Telephone visit  Start Time:  2:00  PM  End Time:  2:07 PM     Medication Therapy Recommendations  No medication therapy recommendations to display       Again, thank you for allowing me to participate in the care of your patient.        Sincerely,        Kaycee Conrad [facility]

## 2024-02-26 NOTE — PROGRESS NOTES
Medication Therapy Management (MTM) Encounter    ASSESSMENT:                            Medication Adherence/Access: No issues identified    H pylori: Jennifer would benefit from taking H pylori salvage regimen as directed. She would benefit from eradication testing at least four weeks after she completed treatment (testing around the beginning of April). It seems like she has had some symptomatic improvement, which is a positive sign. She has follow-up scheduled with the GI team, shortly before she is due for eradication testing.    PLAN:                            Jennifer to complete H pylori treatment as directed.  Will plan on doing H pylori re-testing on April 2nd or after.    Follow-up:    -- on 4/2 or after for eradication testing (via stool antigen)   -- with Agnieszka Dumont PA-C as planned   -- MTM as needed, pending eradication results    SUBJECTIVE/OBJECTIVE:                          Quoc Fallon is a 68 year old female called for a follow-up visit from 2/15/2023.     Our visit held with  ID [id number].    Reason for visit: H pylori follow-up    Allergies/ADRs: None  Tobacco: She reports that she has never smoked. She has never used smokeless tobacco.  Alcohol: not currently using    Medication Adherence/Access: no issues reported    H pylori:   Esomeprazole 20 mg twice daily   Amoxicillin 750 mg three times daily   Levofloxacin 500 mg daily     Notes that everything seems fine. Notes that she hasn't had symptoms in a few days. Denies heartburn or upper abdominal symptoms. Notes she started these last week, she thinks Tuesday but maybe Wednesday. Confirms she is about half-way through treatment today. No questions or concerns for me today. Confirms medication directions.    Copied Forward:  Jennifer had an EGD at the end of November 2023 which was positive for H pylori. She was then treated with bismuth quadruple therapy and was able to complete the entire treatment. She completed  eradication testing in early February 2024, which remained positive. Notes that before treatment she did not have any acid reflux, but now notices this bothers her more. Works from 5 PM-1AM and states by the end of the shift, when she has not done anything for this it bothers her the most. Notes she has five others in her household.      Today's Vitals: LMP  (LMP Unknown)   ----------------    I spent 7 minutes with this patient today. All changes were made via collaborative practice agreement with Hayder Reyes. A copy of the visit note was provided to the patient's provider(s).    A summary of these recommendations was declined by the patient.    Kaycee AmayaD, BCACP  MTM Pharmacist   [facility]   Phone: [phone]    Telemedicine Visit Details  Type of service:  Telephone visit  Start Time:  2:00  PM  End Time:  2:07 PM     Medication Therapy Recommendations  No medication therapy recommendations to display

## 2024-03-15 NOTE — TELEPHONE ENCOUNTER
Action 03/15/24 MMT   Action Taken   Internal referral, Pt already established in clinic. No outside records

## 2024-03-27 ENCOUNTER — LAB (OUTPATIENT)
Dept: LAB | Facility: CLINIC | Age: 69
End: 2024-03-27
Payer: COMMERCIAL

## 2024-03-27 ENCOUNTER — PRE VISIT (OUTPATIENT)
Dept: GASTROENTEROLOGY | Facility: CLINIC | Age: 69
End: 2024-03-27

## 2024-03-27 ENCOUNTER — OFFICE VISIT (OUTPATIENT)
Dept: GASTROENTEROLOGY | Facility: CLINIC | Age: 69
End: 2024-03-27
Payer: COMMERCIAL

## 2024-03-27 VITALS
HEART RATE: 62 BPM | DIASTOLIC BLOOD PRESSURE: 71 MMHG | WEIGHT: 205.4 LBS | HEIGHT: 64 IN | BODY MASS INDEX: 35.07 KG/M2 | SYSTOLIC BLOOD PRESSURE: 135 MMHG

## 2024-03-27 DIAGNOSIS — K29.70 HELICOBACTER PYLORI GASTRITIS: ICD-10-CM

## 2024-03-27 DIAGNOSIS — B96.81 HELICOBACTER PYLORI GASTRITIS: ICD-10-CM

## 2024-03-27 DIAGNOSIS — D50.9 IRON DEFICIENCY ANEMIA, UNSPECIFIED IRON DEFICIENCY ANEMIA TYPE: Primary | ICD-10-CM

## 2024-03-27 DIAGNOSIS — A04.8 H. PYLORI INFECTION: ICD-10-CM

## 2024-03-27 PROCEDURE — 99417 PROLNG OP E/M EACH 15 MIN: CPT | Performed by: STUDENT IN AN ORGANIZED HEALTH CARE EDUCATION/TRAINING PROGRAM

## 2024-03-27 PROCEDURE — 99215 OFFICE O/P EST HI 40 MIN: CPT | Performed by: STUDENT IN AN ORGANIZED HEALTH CARE EDUCATION/TRAINING PROGRAM

## 2024-03-27 ASSESSMENT — PAIN SCALES - GENERAL: PAINLEVEL: NO PAIN (0)

## 2024-03-27 NOTE — LETTER
3/27/2024         RE: Quoc Fallon  [address] [address]        Dear Colleague,    Thank you for referring your patient, Quoc Fallon, to the [facility] [place]. Please see a copy of my visit note below.    GI CLINIC VISIT    CC/REFERRING MD:  No ref. provider found  REASON FOR CONSULTATION:   Quoc Fallon is a 68 year old female who I was asked to see in consultation at the request of Dr. Hills ref. provider found for   Chief Complaint   Patient presents with    New Patient       ASSESSMENT/PLAN:  1. Iron deficiency anemia, unspecified iron deficiency anemia type  2. H. pylori infection  Ms. Jorge Fallon had workup for iron deficiency anemia with colonoscopy and EGD at the end of November 2023 which showed non-bleeding gastritis and was positive for H pylori. She was treated with bismuth quadruple therapy and was able to complete the entire treatment. She completed eradication testing in early February 2024, which remained positive and re-treatment was initiated with levofloxacin triple therapy.     She has also been seen by hematology for iron deficiency anemia. She had initially been found at a routine clinic visit on 8/16/23 to have Hgb of 7.3, MCV 64. Repeat Hgb on 8/18/23 with Hgb of 10.0, MCV 78. She had a blood transfusion in the ER. She was started on oral iron and hemoglobin improved to 13.3. Plan is to repeat CBC in 6/2024.     After completing most recent 14 day H pylori treatment she started feeling gastritis again, describes as a little bit of aching in the epigastric region of the abdomen that lingers. Also having heartburn. She plans to do repeat stool test for eradication on or after April 2nd (next week). She is not having dysphagia, unintentional weight loss, melena.     We reviewed the following plan:   -- H pylori eradication testing 4/2 or after  -- Encouraged eating some other types of food with the fruit that  seems to bring on the abdominal pain and heartburn  -- If H pylori is still positive, will re-treat  -- If H pylori is negative, will plan to start omeprazole 20 mg daily  -- Recheck Hgb per hematology in June  -- Follow up 3-4 months  -- Future considerations could include capsule endoscopy if CAROLYN returns    # Colon cancer screening   Colonoscopy 11/2023 with no polyps, recommended to repeat in 7 years (history of adenomatous polyp).      Specialty Problems    None      RTC 3-4 months      Thank you for this consultation. It was a pleasure to participate in the care of this patient; please contact us with any further questions.  A total of 30 face-to-face minutes was spent with this patient, >50% of which was counseling regarding the above delineated issues. An additional 31 minutes was spent on the date of the encounter doing chart review, documentation, and further activities as noted above.    Agnieszak Dumont PA-C  Division of Gastroenterology, Hepatology and Nutrition  [facility]    ---------------------------------------------------------------------------------------------------  HPI:  Quoc Fallon is a 68 year old female with past medical history significant for type 2 diabetes, hypothyroidism, who presents for epigastric pain, heartburn, H pylori, iron deficiency.     Ms. Jorge Fallon had workup for iron deficiency anemia with colonoscopy and EGD at the end of November 2023 which showed non-bleeding gastritis and was positive for H pylori. She was then treated with bismuth quadruple therapy and was able to complete the entire treatment. She completed eradication testing in early February 2024, which remained positive. Notes she has five others in her household.      She has been seen by hematology for iron deficiency anemia. She has a history of this in the past as well. She had initially been found at a routine clinic visit on 8/16/23 to have Hgb of 7.3, MCV 64. Repeat Hgb on 8/18/23  with Hgb of 10.0, MCV 78. She had a blood transfusion in the ER. She was started on oral iron and hemoglobin improved to 13.3. Plan is to repeat CBC in 6/2024.     Today, she reports after 14 day treatment started feeling gastritis again, describes as a little bit of aching in the epigastric region of the abdomen that lingers. This pain was there before H pylori treatment and is a little bit better now. Seems to be worse around 10 pm. She works from 3:30 pm - 1 am and has a lunch break around 9 pm when she eats fruits including blueberries, raspberries, and kiwi. Around 10 pm she starts to notice heartburn and epigastric pain. Once she gets home and goes to sleep, she has no pain and it does not wake her from sleep. She also feels acid reflux/acid sensation sitting in the chest which feels like heartburn. She otherwise prepares her meals at home and eats healthy, avoids spicy foods. She stopped PPI when she stopped H pylori treatment and plans to have H pylori eradication testing on or after 4/2 (next week). She denies dysphagia to solids or liquids. Continues on oral iron.    FV  used for the visit.      Wt Readings from Last 10 Encounters:   03/27/24 93.2 kg (205 lb 6.4 oz)   12/18/23 99.3 kg (219 lb)   09/20/23 91.9 kg (202 lb 9.6 oz)   09/08/23 93.2 kg (205 lb 6.4 oz)   08/24/23 92.4 kg (203 lb 9.6 oz)   08/16/23 92.5 kg (204 lb)   02/08/22 98 kg (216 lb)   01/11/22 98.2 kg (216 lb 6.4 oz)   06/01/21 93.9 kg (207 lb)   11/27/20 95.9 kg (211 lb 6.4 oz)        ROS:    A 10 point review of systems was performed and is negative except as noted in the HPI.     PROBLEM LIST  Patient Active Problem List    Diagnosis Date Noted    Deltoid tendinitis of right shoulder 11/06/2018     Priority: Medium    Morbid obesity (H) 10/31/2018     Priority: Medium    Vitamin D deficiency 06/13/2017     Priority: Medium    Diabetes mellitus type 2 in obese (H) 05/25/2016     Priority: Medium    Hypothyroidism,  "unspecified type 05/25/2016     Priority: Medium    Iron deficiency anemia, unspecified iron deficiency anemia type 05/25/2016     Priority: Medium    Advanced directives, counseling/discussion 03/23/2016     Priority: Medium     Discussed Advance Directive planning with patient; information given to patient to review.      Postmenopausal status 03/23/2016     Priority: Medium    Bilateral ankle pain 03/23/2016     Priority: Medium    Dermatophytosis of nail 03/23/2016     Priority: Medium    CARDIOVASCULAR SCREENING; LDL GOAL LESS THAN 70      Priority: Medium    Abnormal Pap smear of cervix 01/01/2012     Priority: Medium     2012 (approx date) = abnormal pap. Per 10/31/18 visit notes: \"Patient states approx 6 years ago she had abnormal Pap followed up with 'additional testing' She does not endorse ablative therapy being performed.\"  2016 NIL pap  2018 NIL pap, neg HR HPV. Plan 3 year cotest           PERTINENT PAST MEDICAL HISTORY:  Past Medical History:   Diagnosis Date    Abnormal Pap smear of cervix 2012    approx date - pt reports in 10/31/18 visit notes that \"additional testing\" was performed    CARDIOVASCULAR SCREENING; LDL GOAL LESS THAN 130     Constipation     Diabetes (H) diagnosed 2016    controlled with diet only; no meds.    Diarrhea     Hypothyroidism        PREVIOUS SURGERIES:  Past Surgical History:   Procedure Laterality Date    CHOLECYSTECTOMY  2004    COLONOSCOPY Left 6/23/2017    Procedure: COMBINED COLONOSCOPY, SINGLE OR MULTIPLE BIOPSY/POLYPECTOMY BY BIOPSY;  COLONOSCOPY with polypectomy of colon polyp by cold biopsy  ;  Surgeon: Chang Beasley MD;  Location:  GI    COLONOSCOPY N/A 11/29/2023    Procedure: Colonoscopy;  Surgeon: Hayder Reyes MD;  Location:  GI    ESOPHAGOSCOPY, GASTROSCOPY, DUODENOSCOPY (EGD), COMBINED N/A 11/29/2023    Procedure: Esophagoscopy, gastroscopy, duodenoscopy (EGD), combined;  Surgeon: Hayder Reyes MD;  Location: [facility]    TUBAL " LIGATION  1987       PREVIOUS ENDOSCOPY:    EGD 11/29/23  Impression:               - Normal esophagus.                             - Z-line regular, 37 cm from the incisors.                             - Erythematous mucosa in the gastric body. Biopsied.                             - One gastric polyp in gastric body. Resected and                             retrieved.                             - Normal mucosa was found in the antrum and                             incisura. Biopsied.                             - One gastric polyp in antrum. Resected and                             retrieved.                             - Normal duodenal bulb, first portion of the                             duodenum, second portion of the duodenum and third                             portion of the duodenum. Biopsied.                             No clear reason for significant iron deficiency                             noted on exam today. There could be some                             microscopic oozing from gastritis but would not                             expect significant anemia from this (7s). Biopsies                             taken of duodenum to evaluate for celiac and                             stomach to evaluate for h pylori. See same day                             colonoscopy report.   Recommendation:           - Await pathology results.                             - Use Prilosec (omeprazole) 40 mg PO daily for 2                             months (will adjust if h pylori is positive).                             - Perform a colonoscopy today.     Colonoscopy 11/29/23  Impression:               - The examined portion of the ileum was normal.                             - One 2 mm polyp in the cecum, removed with a jumbo                             cold forceps. Resected and retrieved.                             - Diverticulosis in the sigmoid colon and ascending                             colon.                              - The examination was otherwise normal on direct                             and retroflexion views.   Recommendation:           - Discharge patient to home (with escort).                             - Resume previous diet.                             - Continue present medications.                             - Await pathology results.                             - Repeat colonoscopy in 7 years for surveillance                             based on pathology results.                             - Return to referring physician as previously                             scheduled.                             - See same day EGD report. No reason for iron                             deficiency anemia found on this colonoscopy. If no                             reason for CAROLYN found on EGD biopsies and no other                             reason for CAROLYN is found then referring provider can                             consider video capsule endoscopy to further                             evaluate.       ALLERGIES:   No Known Allergies    PERTINENT MEDICATIONS:  Current Outpatient Medications   Medication    amoxicillin (AMOXIL) 250 MG capsule    atorvastatin (LIPITOR) 20 MG tablet    esomeprazole (NEXIUM) 20 MG DR capsule    ferrous sulfate (FE TABS) 325 (65 Fe) MG EC tablet    levofloxacin (LEVAQUIN) 500 MG tablet    levothyroxine (SYNTHROID/LEVOTHROID) 112 MCG tablet    vitamin D3 (CHOLECALCIFEROL) 50 mcg (2000 units) tablet     Current Facility-Administered Medications   Medication    methylPREDNISolone (DEPO-MEDROL) injection 40 mg       SOCIAL HISTORY:  Social History     Socioeconomic History    Marital status:      Spouse name: Not on file    Number of children: Not on file    Years of education: Not on file    Highest education level: Not on file   Occupational History    Not on file   Tobacco Use    Smoking status: Never    Smokeless tobacco: Never   Vaping Use    Vaping Use: Never used  "  Substance and Sexual Activity    Alcohol use: Yes     Alcohol/week: 0.0 standard drinks of alcohol     Comment: Socially    Drug use: No    Sexual activity: Yes     Partners: Male   Other Topics Concern    Parent/sibling w/ CABG, MI or angioplasty before 65F 55M? Not Asked   Social History Narrative    Not on file     Social Determinants of Health     Financial Resource Strain: Not on file   Food Insecurity: Not on file   Transportation Needs: Not on file   Physical Activity: Not on file   Stress: Not on file   Social Connections: Not on file   Interpersonal Safety: Not on file   Housing Stability: Not on file       FAMILY HISTORY:  Family History   Problem Relation Age of Onset    Diabetes Brother     Diabetes Brother     Coronary Artery Disease Mother     Breast Cancer No family hx of     Colon Cancer No family hx of     Prostate Cancer No family hx of     Other Cancer No family hx of        Past/family/social history reviewed and no changes    PHYSICAL EXAMINATION:  Vitals /71 (BP Location: Left arm, Patient Position: Sitting, Cuff Size: Adult Large)   Pulse 62   Ht 1.626 m (5' 4\")   Wt 93.2 kg (205 lb 6.4 oz)   LMP  (LMP Unknown)   BMI 35.26 kg/m     Wt   Wt Readings from Last 2 Encounters:   03/27/24 93.2 kg (205 lb 6.4 oz)   12/18/23 99.3 kg (219 lb)      Gen: Pt sitting up on exam table in NAD, interactive and cooperative on exam  Eyes: sclerae anicteric, no injection  ENT:  OP clear, MMM  Cardiac: RRR, nl S1, S2, no murmurs, rubs or gallops  Resp: Clear on anterior exam  GI: Normoactive BS, abd soft, flat, tenderness to palpation over epigastric region, no organomegaly or masses palpated  Skin: Warm, dry, no jaundice, nails appear healthy  Neuro: alert, oriented, answers questions appropriately      PERTINENT STUDIES:    Lab on 02/05/2024   Component Date Value Ref Range Status    Helicobacter pylori Antigen Stool 02/06/2024 Positive (A)  Negative Final         Again, thank you for allowing me " to participate in the care of your patient.      Sincerely,    Agnieszka Dumont PA-C

## 2024-03-27 NOTE — PATIENT INSTRUCTIONS
It was a pleasure taking care of you today.  I've included a brief summary of our discussion and care plan from today's visit below.  Please review this information with your primary care provider.  _______________________________________________________________________     My recommendations are summarized as follows:  -- Incorporate other foods with your fruit such as bread, crackers, yogurt, sandwich   -- Stool test for H pylori on or after April 2nd   -- After that result comes back, we can plan to start you on omeprazole to help with reflux and gastritis  -- If H pylori is still positive, we will re-treat   -- Recheck hemoglobin per hematology in June   -- Follow up in 3-4 months      ______________________________________________________________________     How do I schedule labs, imaging studies, or procedures that were ordered in clinic today?      Labs: To schedule lab appointment you can contact your local [facility] or call [phone] to schedule at any convenient [facility] location.      Procedures: If a colonoscopy, upper endoscopy, breath test, esophageal manometry, or pH impedence was ordered today, our endoscopy team will call you to schedule this. If you have not heard from our endoscopy team within a week, please call [phone] to schedule.      Imaging Studies: If you were scheduled for a CT scan, X-ray, MRI, ultrasound, HIDA scan or other imaging study, please call [phone] to have this scheduled.      Referral: If a referral to another specialty was ordered, expect a phone call or follow instructions above. If you have not heard from anyone regarding your referral in a week, please call our clinic to check the status.      Who do I call with any questions after my visit?  Please be in touch if there are any further questions that arise following today's visit.  There are multiple ways to contact your gastroenterology care team.       During business hours, you may  reach a Gastroenterology nurse at [phone]     To schedule or reschedule an appointment, please call [phone].      You can always send a secure message through [employer].  [employer] messages are answered by your nurse or doctor typically within 24 hours.  Please allow extra time on weekends and holidays.       For urgent/emergent questions after business hours, you may reach the on-call GI Fellow by contacting the [facility] at [phone].     How will I get the results of any tests ordered?    You will receive all of your results.  If you have signed up for [employer], any tests ordered at your visit will be available to you after your physician reviews them.  Typically this takes 1-2 weeks.  If there are urgent results that require a change in your care plan, your physician or nurse will call you to discuss the next steps.       What is [employer]?  [employer]ik is a secure way for you to access all of your healthcare records from the [facility].  It is a web based computer program, so you can sign on to it from any location.  It also allows you to send secure messages to your care team.  I recommend signing up for [employer] access if you have not already done so and are comfortable with using a computer.       How to I schedule a follow-up visit?  If you did not schedule a follow-up visit today, please call [phone] to schedule a follow-up office visit.

## 2024-03-27 NOTE — PROGRESS NOTES
GI CLINIC VISIT    CC/REFERRING MD:  No ref. provider found  REASON FOR CONSULTATION:   Quoc Fallon is a 68 year old female who I was asked to see in consultation at the request of Dr. Hills ref. provider found for   Chief Complaint   Patient presents with    New Patient       ASSESSMENT/PLAN:  1. Iron deficiency anemia, unspecified iron deficiency anemia type  2. H. pylori infection  Ms. Jorge Fallon had workup for iron deficiency anemia with colonoscopy and EGD at the end of November 2023 which showed non-bleeding gastritis and was positive for H pylori. She was treated with bismuth quadruple therapy and was able to complete the entire treatment. She completed eradication testing in early February 2024, which remained positive and re-treatment was initiated with levofloxacin triple therapy.     She has also been seen by hematology for iron deficiency anemia. She had initially been found at a routine clinic visit on 8/16/23 to have Hgb of 7.3, MCV 64. Repeat Hgb on 8/18/23 with Hgb of 10.0, MCV 78. She had a blood transfusion in the ER. She was started on oral iron and hemoglobin improved to 13.3. Plan is to repeat CBC in 6/2024.     After completing most recent 14 day H pylori treatment she started feeling gastritis again, describes as a little bit of aching in the epigastric region of the abdomen that lingers. Also having heartburn. She plans to do repeat stool test for eradication on or after April 2nd (next week). She is not having dysphagia, unintentional weight loss, melena.     We reviewed the following plan:   -- H pylori eradication testing 4/2 or after  -- Encouraged eating some other types of food with the fruit that seems to bring on the abdominal pain and heartburn  -- If H pylori is still positive, will re-treat  -- If H pylori is negative, will plan to start omeprazole 20 mg daily  -- Recheck Hgb per hematology in June  -- Follow up 3-4 months  -- Future considerations could include capsule  endoscopy if CAROLYN returns    # Colon cancer screening   Colonoscopy 11/2023 with no polyps, recommended to repeat in 7 years (history of adenomatous polyp).      Specialty Problems    None      RTC 3-4 months      Thank you for this consultation. It was a pleasure to participate in the care of this patient; please contact us with any further questions.  A total of 30 face-to-face minutes was spent with this patient, >50% of which was counseling regarding the above delineated issues. An additional 31 minutes was spent on the date of the encounter doing chart review, documentation, and further activities as noted above.    Agnieszka Dumont PA-C  Division of Gastroenterology, Hepatology and Nutrition  [facility]    ---------------------------------------------------------------------------------------------------  HPI:  Quoc Fallon is a 68 year old female with past medical history significant for type 2 diabetes, hypothyroidism, who presents for epigastric pain, heartburn, H pylori, iron deficiency.     Ms. Jorge Fallon had workup for iron deficiency anemia with colonoscopy and EGD at the end of November 2023 which showed non-bleeding gastritis and was positive for H pylori. She was then treated with bismuth quadruple therapy and was able to complete the entire treatment. She completed eradication testing in early February 2024, which remained positive. Notes she has five others in her household.      She has been seen by hematology for iron deficiency anemia. She has a history of this in the past as well. She had initially been found at a routine clinic visit on 8/16/23 to have Hgb of 7.3, MCV 64. Repeat Hgb on 8/18/23 with Hgb of 10.0, MCV 78. She had a blood transfusion in the ER. She was started on oral iron and hemoglobin improved to 13.3. Plan is to repeat CBC in 6/2024.     Today, she reports after 14 day treatment started feeling gastritis again, describes as a little bit of aching in the  epigastric region of the abdomen that lingers. This pain was there before H pylori treatment and is a little bit better now. Seems to be worse around 10 pm. She works from 3:30 pm - 1 am and has a lunch break around 9 pm when she eats fruits including blueberries, raspberries, and kiwi. Around 10 pm she starts to notice heartburn and epigastric pain. Once she gets home and goes to sleep, she has no pain and it does not wake her from sleep. She also feels acid reflux/acid sensation sitting in the chest which feels like heartburn. She otherwise prepares her meals at home and eats healthy, avoids spicy foods. She stopped PPI when she stopped H pylori treatment and plans to have H pylori eradication testing on or after 4/2 (next week). She denies dysphagia to solids or liquids. Continues on oral iron.    FV  used for the visit.      Wt Readings from Last 10 Encounters:   03/27/24 93.2 kg (205 lb 6.4 oz)   12/18/23 99.3 kg (219 lb)   09/20/23 91.9 kg (202 lb 9.6 oz)   09/08/23 93.2 kg (205 lb 6.4 oz)   08/24/23 92.4 kg (203 lb 9.6 oz)   08/16/23 92.5 kg (204 lb)   02/08/22 98 kg (216 lb)   01/11/22 98.2 kg (216 lb 6.4 oz)   06/01/21 93.9 kg (207 lb)   11/27/20 95.9 kg (211 lb 6.4 oz)        ROS:    A 10 point review of systems was performed and is negative except as noted in the HPI.     PROBLEM LIST  Patient Active Problem List    Diagnosis Date Noted    Deltoid tendinitis of right shoulder 11/06/2018     Priority: Medium    Morbid obesity (H) 10/31/2018     Priority: Medium    Vitamin D deficiency 06/13/2017     Priority: Medium    Diabetes mellitus type 2 in obese (H) 05/25/2016     Priority: Medium    Hypothyroidism, unspecified type 05/25/2016     Priority: Medium    Iron deficiency anemia, unspecified iron deficiency anemia type 05/25/2016     Priority: Medium    Advanced directives, counseling/discussion 03/23/2016     Priority: Medium     Discussed Advance Directive planning with patient;  "information given to patient to review.      Postmenopausal status 03/23/2016     Priority: Medium    Bilateral ankle pain 03/23/2016     Priority: Medium    Dermatophytosis of nail 03/23/2016     Priority: Medium    CARDIOVASCULAR SCREENING; LDL GOAL LESS THAN 70      Priority: Medium    Abnormal Pap smear of cervix 01/01/2012     Priority: Medium     2012 (approx date) = abnormal pap. Per 10/31/18 visit notes: \"Patient states approx 6 years ago she had abnormal Pap followed up with 'additional testing' She does not endorse ablative therapy being performed.\"  2016 NIL pap  2018 NIL pap, neg HR HPV. Plan 3 year cotest           PERTINENT PAST MEDICAL HISTORY:  Past Medical History:   Diagnosis Date    Abnormal Pap smear of cervix 2012    approx date - pt reports in 10/31/18 visit notes that \"additional testing\" was performed    CARDIOVASCULAR SCREENING; LDL GOAL LESS THAN 130     Constipation     Diabetes (H) diagnosed 2016    controlled with diet only; no meds.    Diarrhea     Hypothyroidism        PREVIOUS SURGERIES:  Past Surgical History:   Procedure Laterality Date    CHOLECYSTECTOMY  2004    COLONOSCOPY Left 6/23/2017    Procedure: COMBINED COLONOSCOPY, SINGLE OR MULTIPLE BIOPSY/POLYPECTOMY BY BIOPSY;  COLONOSCOPY with polypectomy of colon polyp by cold biopsy  ;  Surgeon: Chang Beasley MD;  Location:  GI    COLONOSCOPY N/A 11/29/2023    Procedure: Colonoscopy;  Surgeon: Hayder Reyes MD;  Location: [facility]    ESOPHAGOSCOPY, GASTROSCOPY, DUODENOSCOPY (EGD), COMBINED N/A 11/29/2023    Procedure: Esophagoscopy, gastroscopy, duodenoscopy (EGD), combined;  Surgeon: Hayder Reyes MD;  Location: [facility]    TUBAL LIGATION  1987       PREVIOUS ENDOSCOPY:    EGD 11/29/23  Impression:               - Normal esophagus.                             - Z-line regular, 37 cm from the incisors.                             - Erythematous mucosa in the gastric body. Biopsied.                          "    - One gastric polyp in gastric body. Resected and                             retrieved.                             - Normal mucosa was found in the antrum and                             incisura. Biopsied.                             - One gastric polyp in antrum. Resected and                             retrieved.                             - Normal duodenal bulb, first portion of the                             duodenum, second portion of the duodenum and third                             portion of the duodenum. Biopsied.                             No clear reason for significant iron deficiency                             noted on exam today. There could be some                             microscopic oozing from gastritis but would not                             expect significant anemia from this (7s). Biopsies                             taken of duodenum to evaluate for celiac and                             stomach to evaluate for h pylori. See same day                             colonoscopy report.   Recommendation:           - Await pathology results.                             - Use Prilosec (omeprazole) 40 mg PO daily for 2                             months (will adjust if h pylori is positive).                             - Perform a colonoscopy today.     Colonoscopy 11/29/23  Impression:               - The examined portion of the ileum was normal.                             - One 2 mm polyp in the cecum, removed with a jumbo                             cold forceps. Resected and retrieved.                             - Diverticulosis in the sigmoid colon and ascending                             colon.                             - The examination was otherwise normal on direct                             and retroflexion views.   Recommendation:           - Discharge patient to home (with escort).                             - Resume previous diet.                             -  Continue present medications.                             - Await pathology results.                             - Repeat colonoscopy in 7 years for surveillance                             based on pathology results.                             - Return to referring physician as previously                             scheduled.                             - See same day EGD report. No reason for iron                             deficiency anemia found on this colonoscopy. If no                             reason for CAROLYN found on EGD biopsies and no other                             reason for CAROLYN is found then referring provider can                             consider video capsule endoscopy to further                             evaluate.       ALLERGIES:   No Known Allergies    PERTINENT MEDICATIONS:  Current Outpatient Medications   Medication    amoxicillin (AMOXIL) 250 MG capsule    atorvastatin (LIPITOR) 20 MG tablet    esomeprazole (NEXIUM) 20 MG DR capsule    ferrous sulfate (FE TABS) 325 (65 Fe) MG EC tablet    levofloxacin (LEVAQUIN) 500 MG tablet    levothyroxine (SYNTHROID/LEVOTHROID) 112 MCG tablet    vitamin D3 (CHOLECALCIFEROL) 50 mcg (2000 units) tablet     Current Facility-Administered Medications   Medication    methylPREDNISolone (DEPO-MEDROL) injection 40 mg       SOCIAL HISTORY:  Social History     Socioeconomic History    Marital status:      Spouse name: Not on file    Number of children: Not on file    Years of education: Not on file    Highest education level: Not on file   Occupational History    Not on file   Tobacco Use    Smoking status: Never    Smokeless tobacco: Never   Vaping Use    Vaping Use: Never used   Substance and Sexual Activity    Alcohol use: Yes     Alcohol/week: 0.0 standard drinks of alcohol     Comment: Socially    Drug use: No    Sexual activity: Yes     Partners: Male   Other Topics Concern    Parent/sibling w/ CABG, MI or angioplasty before 65F 55M? Not  "Asked   Social History Narrative    Not on file     Social Determinants of Health     Financial Resource Strain: Not on file   Food Insecurity: Not on file   Transportation Needs: Not on file   Physical Activity: Not on file   Stress: Not on file   Social Connections: Not on file   Interpersonal Safety: Not on file   Housing Stability: Not on file       FAMILY HISTORY:  Family History   Problem Relation Age of Onset    Diabetes Brother     Diabetes Brother     Coronary Artery Disease Mother     Breast Cancer No family hx of     Colon Cancer No family hx of     Prostate Cancer No family hx of     Other Cancer No family hx of        Past/family/social history reviewed and no changes    PHYSICAL EXAMINATION:  Vitals /71 (BP Location: Left arm, Patient Position: Sitting, Cuff Size: Adult Large)   Pulse 62   Ht 1.626 m (5' 4\")   Wt 93.2 kg (205 lb 6.4 oz)   LMP  (LMP Unknown)   BMI 35.26 kg/m     Wt   Wt Readings from Last 2 Encounters:   03/27/24 93.2 kg (205 lb 6.4 oz)   12/18/23 99.3 kg (219 lb)      Gen: Pt sitting up on exam table in NAD, interactive and cooperative on exam  Eyes: sclerae anicteric, no injection  ENT:  OP clear, MMM  Cardiac: RRR, nl S1, S2, no murmurs, rubs or gallops  Resp: Clear on anterior exam  GI: Normoactive BS, abd soft, flat, tenderness to palpation over epigastric region, no organomegaly or masses palpated  Skin: Warm, dry, no jaundice, nails appear healthy  Neuro: alert, oriented, answers questions appropriately      PERTINENT STUDIES:    Lab on 02/05/2024   Component Date Value Ref Range Status    Helicobacter pylori Antigen Stool 02/06/2024 Positive (A)  Negative Final         "

## 2024-04-02 ENCOUNTER — TELEPHONE (OUTPATIENT)
Dept: INTERNAL MEDICINE | Facility: CLINIC | Age: 69
End: 2024-04-02
Payer: COMMERCIAL

## 2024-04-02 ENCOUNTER — TELEPHONE (OUTPATIENT)
Dept: GASTROENTEROLOGY | Facility: CLINIC | Age: 69
End: 2024-04-02
Payer: COMMERCIAL

## 2024-04-02 PROCEDURE — 99000 SPECIMEN HANDLING OFFICE-LAB: CPT | Performed by: PATHOLOGY

## 2024-04-02 PROCEDURE — 87338 HPYLORI STOOL AG IA: CPT | Performed by: INTERNAL MEDICINE

## 2024-04-02 NOTE — TELEPHONE ENCOUNTER
Left Voicemail (1st Attempt) for the patient to call back and schedule the following:    Appointment type: Return GI  Provider: Agnieszka Dumont PA-C  Return date: 07/11/24  Specialty phone number: [phone]  Additional appointment(s) needed: N/A  Additonal Notes: N/A

## 2024-04-03 LAB — H PYLORI AG STL QL IA: NEGATIVE

## 2024-04-04 ENCOUNTER — TELEPHONE (OUTPATIENT)
Dept: GASTROENTEROLOGY | Facility: CLINIC | Age: 69
End: 2024-04-04
Payer: COMMERCIAL

## 2024-04-04 ENCOUNTER — APPOINTMENT (OUTPATIENT)
Dept: INTERPRETER SERVICES | Facility: CLINIC | Age: 69
End: 2024-04-04
Payer: COMMERCIAL

## 2024-04-04 NOTE — TELEPHONE ENCOUNTER
Patient confirmed scheduled appointment:  Date: 07/12/24  Time: 10am  Visit type: Return GI  Provider: Agnieszka Dumont PA-C  Location: [address]  Testing/imaging: N/A  Additional notes: N/A

## 2024-04-11 DIAGNOSIS — R10.13 EPIGASTRIC PAIN: Primary | ICD-10-CM

## 2024-04-12 ENCOUNTER — PATIENT OUTREACH (OUTPATIENT)
Dept: GASTROENTEROLOGY | Facility: CLINIC | Age: 69
End: 2024-04-12
Payer: COMMERCIAL

## 2024-04-12 NOTE — PROGRESS NOTES
Received a message from Agnieszka FRANCOIS the H pylori stool study was negative so an order for omeprazole was sent to her pharmacy.     Spoke with Ma with he assistance of a  and provided the above information.  Discussed to take 1 time a day and it was sent to [facility] in [place].   Verbalized understanding and no further questions

## 2024-05-28 ENCOUNTER — OFFICE VISIT (OUTPATIENT)
Dept: INTERNAL MEDICINE | Facility: CLINIC | Age: 69
End: 2024-05-28
Payer: COMMERCIAL

## 2024-05-28 VITALS
DIASTOLIC BLOOD PRESSURE: 84 MMHG | TEMPERATURE: 98.4 F | SYSTOLIC BLOOD PRESSURE: 146 MMHG | HEIGHT: 64 IN | WEIGHT: 212 LBS | BODY MASS INDEX: 36.19 KG/M2 | OXYGEN SATURATION: 98 % | RESPIRATION RATE: 18 BRPM | HEART RATE: 67 BPM

## 2024-05-28 DIAGNOSIS — Z00.00 ROUTINE GENERAL MEDICAL EXAMINATION AT A HEALTH CARE FACILITY: Primary | ICD-10-CM

## 2024-05-28 DIAGNOSIS — I10 BENIGN ESSENTIAL HYPERTENSION: ICD-10-CM

## 2024-05-28 DIAGNOSIS — Z78.0 POST-MENOPAUSAL: ICD-10-CM

## 2024-05-28 DIAGNOSIS — E55.9 VITAMIN D DEFICIENCY: ICD-10-CM

## 2024-05-28 DIAGNOSIS — E66.9 TYPE 2 DIABETES MELLITUS WITH OBESITY (H): ICD-10-CM

## 2024-05-28 DIAGNOSIS — E66.01 CLASS 2 SEVERE OBESITY DUE TO EXCESS CALORIES WITH SERIOUS COMORBIDITY AND BODY MASS INDEX (BMI) OF 36.0 TO 36.9 IN ADULT (H): ICD-10-CM

## 2024-05-28 DIAGNOSIS — Z12.31 ENCOUNTER FOR SCREENING MAMMOGRAM FOR BREAST CANCER: ICD-10-CM

## 2024-05-28 DIAGNOSIS — E11.69 TYPE 2 DIABETES MELLITUS WITH OBESITY (H): ICD-10-CM

## 2024-05-28 DIAGNOSIS — D50.9 IRON DEFICIENCY ANEMIA, UNSPECIFIED IRON DEFICIENCY ANEMIA TYPE: ICD-10-CM

## 2024-05-28 DIAGNOSIS — E03.9 HYPOTHYROIDISM, UNSPECIFIED TYPE: ICD-10-CM

## 2024-05-28 DIAGNOSIS — E66.812 CLASS 2 SEVERE OBESITY DUE TO EXCESS CALORIES WITH SERIOUS COMORBIDITY AND BODY MASS INDEX (BMI) OF 36.0 TO 36.9 IN ADULT (H): ICD-10-CM

## 2024-05-28 LAB
BASOPHILS # BLD AUTO: 0 10E3/UL (ref 0–0.2)
BASOPHILS NFR BLD AUTO: 0 %
EOSINOPHIL # BLD AUTO: 0.1 10E3/UL (ref 0–0.7)
EOSINOPHIL NFR BLD AUTO: 2 %
ERYTHROCYTE [DISTWIDTH] IN BLOOD BY AUTOMATED COUNT: 13.8 % (ref 10–15)
HBA1C MFR BLD: 6.1 % (ref 0–5.6)
HCT VFR BLD AUTO: 43.2 % (ref 35–47)
HGB BLD-MCNC: 14 G/DL (ref 11.7–15.7)
IMM GRANULOCYTES # BLD: 0 10E3/UL
IMM GRANULOCYTES NFR BLD: 0 %
LYMPHOCYTES # BLD AUTO: 1.5 10E3/UL (ref 0.8–5.3)
LYMPHOCYTES NFR BLD AUTO: 24 %
MCH RBC QN AUTO: 30 PG (ref 26.5–33)
MCHC RBC AUTO-ENTMCNC: 32.4 G/DL (ref 31.5–36.5)
MCV RBC AUTO: 93 FL (ref 78–100)
MONOCYTES # BLD AUTO: 0.4 10E3/UL (ref 0–1.3)
MONOCYTES NFR BLD AUTO: 6 %
NEUTROPHILS # BLD AUTO: 4.3 10E3/UL (ref 1.6–8.3)
NEUTROPHILS NFR BLD AUTO: 68 %
PLATELET # BLD AUTO: 204 10E3/UL (ref 150–450)
RBC # BLD AUTO: 4.66 10E6/UL (ref 3.8–5.2)
WBC # BLD AUTO: 6.3 10E3/UL (ref 4–11)

## 2024-05-28 PROCEDURE — 82306 VITAMIN D 25 HYDROXY: CPT | Performed by: NURSE PRACTITIONER

## 2024-05-28 PROCEDURE — 85025 COMPLETE CBC W/AUTO DIFF WBC: CPT | Performed by: NURSE PRACTITIONER

## 2024-05-28 PROCEDURE — 90472 IMMUNIZATION ADMIN EACH ADD: CPT | Performed by: NURSE PRACTITIONER

## 2024-05-28 PROCEDURE — 99214 OFFICE O/P EST MOD 30 MIN: CPT | Mod: 25 | Performed by: NURSE PRACTITIONER

## 2024-05-28 PROCEDURE — 83540 ASSAY OF IRON: CPT | Performed by: NURSE PRACTITIONER

## 2024-05-28 PROCEDURE — 36415 COLL VENOUS BLD VENIPUNCTURE: CPT | Performed by: NURSE PRACTITIONER

## 2024-05-28 PROCEDURE — 82043 UR ALBUMIN QUANTITATIVE: CPT | Performed by: NURSE PRACTITIONER

## 2024-05-28 PROCEDURE — 83550 IRON BINDING TEST: CPT | Performed by: NURSE PRACTITIONER

## 2024-05-28 PROCEDURE — 90677 PCV20 VACCINE IM: CPT | Performed by: NURSE PRACTITIONER

## 2024-05-28 PROCEDURE — 82728 ASSAY OF FERRITIN: CPT | Performed by: NURSE PRACTITIONER

## 2024-05-28 PROCEDURE — 80061 LIPID PANEL: CPT | Performed by: NURSE PRACTITIONER

## 2024-05-28 PROCEDURE — 99397 PER PM REEVAL EST PAT 65+ YR: CPT | Mod: 25 | Performed by: NURSE PRACTITIONER

## 2024-05-28 PROCEDURE — 83036 HEMOGLOBIN GLYCOSYLATED A1C: CPT | Performed by: NURSE PRACTITIONER

## 2024-05-28 PROCEDURE — 90715 TDAP VACCINE 7 YRS/> IM: CPT | Performed by: NURSE PRACTITIONER

## 2024-05-28 PROCEDURE — 80053 COMPREHEN METABOLIC PANEL: CPT | Performed by: NURSE PRACTITIONER

## 2024-05-28 PROCEDURE — 90471 IMMUNIZATION ADMIN: CPT | Performed by: NURSE PRACTITIONER

## 2024-05-28 PROCEDURE — 84443 ASSAY THYROID STIM HORMONE: CPT | Performed by: NURSE PRACTITIONER

## 2024-05-28 PROCEDURE — 82570 ASSAY OF URINE CREATININE: CPT | Performed by: NURSE PRACTITIONER

## 2024-05-28 RX ORDER — LANCETS
EACH MISCELLANEOUS
Qty: 100 EACH | Refills: 6 | Status: SHIPPED | OUTPATIENT
Start: 2024-05-28

## 2024-05-28 RX ORDER — LOSARTAN POTASSIUM 25 MG/1
25 TABLET ORAL DAILY
Qty: 90 TABLET | Refills: 3 | Status: SHIPPED | OUTPATIENT
Start: 2024-05-28

## 2024-05-28 NOTE — PROGRESS NOTES
Assessment & Plan     (Z00.00) Routine general medical examination at a health care facility  (primary encounter diagnosis)  Comment: Feels well. Tdap booster and PCV20 administered today. Routine lab work ordered.   Plan: Lipid panel reflex to direct LDL Fasting,         Comprehensive metabolic panel (BMP + Alb, Alk         Phos, ALT, AST, Total. Bili, TP), TSH with free        T4 reflex, CBC with platelets and differential    (I10) Benign essential hypertension  Comment: BP elevated today 136/90 and 146/84. Has not previously been on blood pressure medication. Plan to start losartan 25 mg daily. Plant to recheck BMP in 2-3 weeks and recheck BP in office in 4 weeks either with nurse-only or provider visit.   Plan: losartan (COZAAR) 25 MG tablet, Basic metabolic        panel  (Ca, Cl, CO2, Creat, Gluc, K, Na, BUN),         Albumin Random Urine Quantitative with Creat         Ratio    (E11.69,  E66.9) Type 2 diabetes mellitus with obesity (H)  Comment: Most recent A1c values:   Hemoglobin A1C   Date Value Ref Range Status   05/28/2024 6.1 (H) 0.0 - 5.6 % Final     Comment:     Normal <5.7%   Prediabetes 5.7-6.4%    Diabetes 6.5% or higher     Note: Adopted from ADA consensus guidelines.   08/16/2023 6.1 (H) 0.0 - 5.6 % Final     Comment:     Normal <5.7%   Prediabetes 5.7-6.4%    Diabetes 6.5% or higher     Note: Adopted from ADA consensus guidelines.   06/01/2021 6.0 (H) 0 - 5.6 % Final     Comment:     Normal <5.7% Prediabetes 5.7-6.4%  Diabetes 6.5% or higher - adopted from ADA   consensus guidelines.     02/03/2021 7.0 (H) 0 - 5.6 % Final     Comment:     Normal <5.7% Prediabetes 5.7-6.4%  Diabetes 6.5% or higher - adopted from ADA   consensus guidelines.     01/07/2020 6.4 (H) 0 - 5.6 % Final     Comment:     Normal <5.7% Prediabetes 5.7-6.4%  Diabetes 6.5% or higher - adopted from ADA   consensus guidelines.      Blood glucose currently diet controlled. Ordered new blood glucose meter, lancets, and test  strips per patient request. Routine lab work including A1c ordered today.   Plan: HEMOGLOBIN A1C, Lipid panel reflex to direct         LDL Fasting, Comprehensive metabolic panel (BMP        + Alb, Alk Phos, ALT, AST, Total. Bili, TP),         TSH with free T4 reflex, CBC with platelets and        differential, blood glucose monitoring (NO         BRAND SPECIFIED) meter device kit, blood         glucose (NO BRAND SPECIFIED) test strip, thin         (NO BRAND SPECIFIED) lancets, Albumin Random         Urine Quantitative with Creat Ratio    (E55.9) Vitamin D deficiency  Comment: History of vitamin D deficiency. Most recent vitamin D value 19 in 2021. Plan to recheck vitamin D levels today.   Plan: DEXA HIP/PELVIS/SPINE - Future, Vitamin D         Deficiency    (E66.01,  Z68.36) Class 2 severe obesity due to excess calories with serious comorbidity and body mass index (BMI) of 36.0 to 36.9 in adult (H)  Comment: Noted. BMI has decreased from 41 to 36 since last September.     (E03.9) Hypothyroidism, unspecified type  Comment:Taking synthroid for hypothyroidism. Most recent thyroid labs:  TSH   Date Value Ref Range Status   08/16/2023 5.30 (H) 0.30 - 4.20 uIU/mL Final   06/01/2021 3.04 0.40 - 4.00 mU/L Final    Plan to recheck thyroid labs today.     (D50.9) Iron deficiency anemia, unspecified iron deficiency anemia type  Comment: History of iron deficiency anemia requiring trip to ED last August for blood transfusion. EGD and colonoscopy in November of 2023 showed non-bleeding gastritis and she has been treated for H pylori twice. She is now H pylori negative as of 4/2/24. She is also being followed by hematology. Hematology recommends June for her next follow up. She is currently taking an oral iron supplement - ferrous sulfate 325 mg 1 tablet daily. Plan to recheck CBC and iron studies today.   Plan: CBC with platelets and differential, Iron and         iron binding capacity, Ferritin    (Z12.31) Encounter for  "screening mammogram for breast cancer  Comment: Last mammogram 9/7/23. Ordered placed for next mammogram.   Plan: MA Screen Bilateral w/Alfred    (Z78.0) Post-menopausal  Comment:DEXA scan recommended based on age and post menopausal status. Patient is in agreement with doing this test. Order placed.   Plan: DEXA HIP/PELVIS/SPINE - Future            BMI  Estimated body mass index is 36.39 kg/m  as calculated from the following:    Height as of this encounter: 1.626 m (5' 4\").    Weight as of this encounter: 96.2 kg (212 lb).         Patient Instructions   Lab in [address]    To schedule your mammogram, you may call the breast center at [phone].     Bone density - they will call you to set up    Start losartan once daily for blood pressure     Recheck lab in 2 weeks     Recheck blood pressure in 4 weeks     Subjective   Quoc Thakur is a 68 year old, presenting for the following health issues:  Establish Care        5/28/2024     9:46 AM   Additional Questions   Roomed by Shanna AQUINO     HPI     Fasting     Iron deficient   Iron daily   No periods and now iron seem to be fine     Lab scheduled in June to recheck iron etc   Seeing hematology     Gastroenterology prescribed omeprazole     Hypothyroid   Dose working well     Stopped cholesterol medication - lipitor      Blood pressure elevated   At home 130/90  125/89  Need to start medication   146/84  136/90 in clinic     # [id number]      Patient's , Earle, present during visit.   Review of Systems  Constitutional, neuro, ENT, endocrine, pulmonary, cardiac, gastrointestinal, genitourinary, musculoskeletal, integument and psychiatric systems are negative, except as otherwise noted.      Objective    BP (!) 146/84   Pulse 67   Temp 98.4  F (36.9  C) (Oral)   Resp 18   Ht 1.626 m (5' 4\")   Wt 96.2 kg (212 lb)   LMP  (LMP Unknown)   SpO2 98%   BMI 36.39 kg/m    Body mass index is 36.39 kg/m .  Physical Exam   GENERAL: alert and no distress  EYES: " Eyes grossly normal to inspection, PERRL and conjunctivae and sclerae normal  HENT: ear canals and TM's normal, nose and mouth without ulcers or lesions  RESP: lungs clear to auscultation - no rales, rhonchi or wheezes  CV: regular rate and rhythm, normal S1 S2, no S3 or S4, no murmur, click or rub, no peripheral edema  ABDOMEN: soft, nontender, no hepatosplenomegaly, no masses and bowel sounds normal  MS: no gross musculoskeletal defects noted, no edema  NEURO: Normal strength and tone, mentation intact and speech normal  PSYCH: mentation appears normal, affect normal/bright    Lab   Mammogram   Dexa         Signed Electronically by: ARINA Dee CNP

## 2024-05-28 NOTE — PATIENT INSTRUCTIONS
Lab in [address]    To schedule your mammogram, you may call the breast center at [phone].     Bone density - they will call you to set up    Start losartan once daily for blood pressure     Recheck lab in 2 weeks     Recheck blood pressure in 4 weeks

## 2024-05-28 NOTE — NURSING NOTE
"Chief Complaint   Patient presents with    Establish Care     initial BP (!) 136/90   Pulse 67   Temp 98.4  F (36.9  C) (Oral)   Resp 18   Ht 1.626 m (5' 4\")   Wt 96.2 kg (212 lb)   LMP  (LMP Unknown)   SpO2 98%   BMI 36.39 kg/m   Estimated body mass index is 36.39 kg/m  as calculated from the following:    Height as of this encounter: 1.626 m (5' 4\").    Weight as of this encounter: 96.2 kg (212 lb)..  bp completed using cuff size large  GADIEL VERDE LPN  "

## 2024-05-29 DIAGNOSIS — E78.00 HYPERCHOLESTEREMIA: ICD-10-CM

## 2024-05-29 DIAGNOSIS — E78.1 HYPERTRIGLYCERIDEMIA: Primary | ICD-10-CM

## 2024-05-29 LAB
ALBUMIN SERPL BCG-MCNC: 4.1 G/DL (ref 3.5–5.2)
ALP SERPL-CCNC: 101 U/L (ref 40–150)
ALT SERPL W P-5'-P-CCNC: 23 U/L (ref 0–50)
ANION GAP SERPL CALCULATED.3IONS-SCNC: 10 MMOL/L (ref 7–15)
AST SERPL W P-5'-P-CCNC: 25 U/L (ref 0–45)
BILIRUB SERPL-MCNC: 0.3 MG/DL
BUN SERPL-MCNC: 16.1 MG/DL (ref 8–23)
CALCIUM SERPL-MCNC: 8.9 MG/DL (ref 8.8–10.2)
CHLORIDE SERPL-SCNC: 107 MMOL/L (ref 98–107)
CHOLEST SERPL-MCNC: 187 MG/DL
CREAT SERPL-MCNC: 0.54 MG/DL (ref 0.51–0.95)
CREAT UR-MCNC: 158 MG/DL
DEPRECATED HCO3 PLAS-SCNC: 22 MMOL/L (ref 22–29)
EGFRCR SERPLBLD CKD-EPI 2021: >90 ML/MIN/1.73M2
FASTING STATUS PATIENT QL REPORTED: NO
FASTING STATUS PATIENT QL REPORTED: NO
FERRITIN SERPL-MCNC: 40 NG/ML (ref 11–328)
GLUCOSE SERPL-MCNC: 126 MG/DL (ref 70–99)
HDLC SERPL-MCNC: 47 MG/DL
IRON BINDING CAPACITY (ROCHE): 300 UG/DL (ref 240–430)
IRON SATN MFR SERPL: 22 % (ref 15–46)
IRON SERPL-MCNC: 66 UG/DL (ref 37–145)
LDLC SERPL CALC-MCNC: 99 MG/DL
MICROALBUMIN UR-MCNC: 26.6 MG/L
MICROALBUMIN/CREAT UR: 16.84 MG/G CR (ref 0–25)
NONHDLC SERPL-MCNC: 140 MG/DL
POTASSIUM SERPL-SCNC: 4.2 MMOL/L (ref 3.4–5.3)
PROT SERPL-MCNC: 7.3 G/DL (ref 6.4–8.3)
SODIUM SERPL-SCNC: 139 MMOL/L (ref 135–145)
TRIGL SERPL-MCNC: 206 MG/DL
TSH SERPL DL<=0.005 MIU/L-ACNC: 3.35 UIU/ML (ref 0.3–4.2)
VIT D+METAB SERPL-MCNC: 22 NG/ML (ref 20–50)

## 2024-05-29 RX ORDER — ROSUVASTATIN CALCIUM 20 MG/1
20 TABLET, COATED ORAL DAILY
Qty: 90 TABLET | Refills: 3 | Status: SHIPPED | OUTPATIENT
Start: 2024-05-29

## 2024-06-14 ENCOUNTER — LAB (OUTPATIENT)
Dept: ONCOLOGY | Facility: CLINIC | Age: 69
End: 2024-06-14
Attending: INTERNAL MEDICINE
Payer: COMMERCIAL

## 2024-06-14 DIAGNOSIS — D50.9 IRON DEFICIENCY ANEMIA, UNSPECIFIED IRON DEFICIENCY ANEMIA TYPE: ICD-10-CM

## 2024-06-14 LAB
BASOPHILS # BLD AUTO: 0 10E3/UL (ref 0–0.2)
BASOPHILS NFR BLD AUTO: 0 %
EOSINOPHIL # BLD AUTO: 0.1 10E3/UL (ref 0–0.7)
EOSINOPHIL NFR BLD AUTO: 2 %
ERYTHROCYTE [DISTWIDTH] IN BLOOD BY AUTOMATED COUNT: 14.2 % (ref 10–15)
HCT VFR BLD AUTO: 42.6 % (ref 35–47)
HGB BLD-MCNC: 13.8 G/DL (ref 11.7–15.7)
IMM GRANULOCYTES # BLD: 0 10E3/UL
IMM GRANULOCYTES NFR BLD: 0 %
LYMPHOCYTES # BLD AUTO: 1.7 10E3/UL (ref 0.8–5.3)
LYMPHOCYTES NFR BLD AUTO: 23 %
MCH RBC QN AUTO: 30.2 PG (ref 26.5–33)
MCHC RBC AUTO-ENTMCNC: 32.4 G/DL (ref 31.5–36.5)
MCV RBC AUTO: 93 FL (ref 78–100)
MONOCYTES # BLD AUTO: 0.5 10E3/UL (ref 0–1.3)
MONOCYTES NFR BLD AUTO: 6 %
NEUTROPHILS # BLD AUTO: 5.1 10E3/UL (ref 1.6–8.3)
NEUTROPHILS NFR BLD AUTO: 68 %
NRBC # BLD AUTO: 0 10E3/UL
NRBC BLD AUTO-RTO: 0 /100
PLATELET # BLD AUTO: 225 10E3/UL (ref 150–450)
RBC # BLD AUTO: 4.57 10E6/UL (ref 3.8–5.2)
WBC # BLD AUTO: 7.5 10E3/UL (ref 4–11)

## 2024-06-14 PROCEDURE — 85025 COMPLETE CBC W/AUTO DIFF WBC: CPT | Performed by: PHYSICIAN ASSISTANT

## 2024-06-14 PROCEDURE — 36415 COLL VENOUS BLD VENIPUNCTURE: CPT

## 2024-06-14 NOTE — PROGRESS NOTES
Medical Assistant Note:  Ma Blaze Fallon presents today for blood draw.    Patient seen by provider today: No.   present during visit today: Yes, Language: Salvadorean.     Concerns: No Concerns.    Procedure:  Lab draw site: right antecub, Needle type: butterfly, Gauge: 23.    Post Assessment:  Labs drawn without difficulty: Yes.    Discharge Plan:  Departure Mode: Ambulatory.    Face to Face Time: 10.    Michelle Gordon, CMA

## 2024-06-17 ENCOUNTER — ONCOLOGY VISIT (OUTPATIENT)
Dept: ONCOLOGY | Facility: CLINIC | Age: 69
End: 2024-06-17
Attending: INTERNAL MEDICINE
Payer: COMMERCIAL

## 2024-06-17 VITALS
WEIGHT: 213 LBS | TEMPERATURE: 97.4 F | BODY MASS INDEX: 36.56 KG/M2 | HEART RATE: 56 BPM | OXYGEN SATURATION: 93 % | DIASTOLIC BLOOD PRESSURE: 77 MMHG | SYSTOLIC BLOOD PRESSURE: 139 MMHG | RESPIRATION RATE: 16 BRPM

## 2024-06-17 DIAGNOSIS — D50.9 IRON DEFICIENCY ANEMIA, UNSPECIFIED IRON DEFICIENCY ANEMIA TYPE: Primary | ICD-10-CM

## 2024-06-17 PROCEDURE — 99213 OFFICE O/P EST LOW 20 MIN: CPT | Performed by: INTERNAL MEDICINE

## 2024-06-17 PROCEDURE — 99214 OFFICE O/P EST MOD 30 MIN: CPT | Performed by: INTERNAL MEDICINE

## 2024-06-17 RX ORDER — LEVOFLOXACIN 500 MG/1
500 TABLET, FILM COATED ORAL DAILY
COMMUNITY

## 2024-06-17 ASSESSMENT — ENCOUNTER SYMPTOMS
CONSTITUTIONAL NEGATIVE: 1
RESPIRATORY NEGATIVE: 1
PSYCHIATRIC NEGATIVE: 1
HEMATOLOGIC/LYMPHATIC NEGATIVE: 1
ARTHRALGIAS: 1
CARDIOVASCULAR NEGATIVE: 1
NEUROLOGICAL NEGATIVE: 1
EYES NEGATIVE: 1

## 2024-06-17 ASSESSMENT — PAIN SCALES - GENERAL: PAINLEVEL: NO PAIN (0)

## 2024-06-17 NOTE — LETTER
6/17/2024      Quoc Fallon  [address] [address]      Dear Colleague,    Thank you for referring your patient, Quoc Fallon, to the [facility]. Please see a copy of my visit note below.    Assessment & Plan  Prior iron deficiency with anemia, corrected with oral iron    Stop oral iron  Routine CBC and provider visit one year (consider discharge to PCP)    Interval History  This is a scheduled follow up visit for this   who was diagnosed with anemia of iron deficiency last year.  She started oral iron and her numbers have all normalized.    Generally, she feels quite well, though she has some aches in her knees and left arm.  She forgot her thyroid medicines while visiting family in [place] recently but has restarted that.    ECOG = 0    Patient Active Problem List   Diagnosis     CARDIOVASCULAR SCREENING; LDL GOAL LESS THAN 70     Postmenopausal status     Bilateral ankle pain     Dermatophytosis of nail     Type 2 diabetes mellitus with obesity (H)     Hypothyroidism, unspecified type     Iron deficiency anemia, unspecified iron deficiency anemia type     Vitamin D deficiency     Deltoid tendinitis of right shoulder     Abnormal Pap smear of cervix     H. pylori infection     Class 2 severe obesity due to excess calories with serious comorbidity in adult (H)     Current Outpatient Medications   Medication Sig Dispense Refill     blood glucose (NO BRAND SPECIFIED) test strip Use to test blood sugar 1 times daily or as directed. To accompany: Blood Glucose Monitor Brands: per insurance. 100 strip 6     blood glucose monitoring (NO BRAND SPECIFIED) meter device kit Use to test blood sugar 1 times daily or as directed. Preferred blood glucose meter OR supplies to accompany: Blood Glucose Monitor Brands: per insurance. 1 kit 0     ferrous sulfate (FE TABS) 325 (65 Fe) MG EC tablet Take 1 tablet (325 mg) by mouth daily       levofloxacin  "(LEVAQUIN) 500 MG tablet Take 500 mg by mouth daily       levothyroxine (SYNTHROID/LEVOTHROID) 112 MCG tablet Take 1 tablet (112 mcg) by mouth daily 90 tablet 3     losartan (COZAAR) 25 MG tablet Take 1 tablet (25 mg) by mouth daily 90 tablet 3     rosuvastatin (CRESTOR) 20 MG tablet Take 1 tablet (20 mg) by mouth daily 90 tablet 3     thin (NO BRAND SPECIFIED) lancets Use with lanceting device. To accompany: Blood Glucose Monitor Brands: per insurance. 100 each 6     vitamin D3 (CHOLECALCIFEROL) 50 mcg (2000 units) tablet Take 2 tablets (100 mcg) by mouth daily 200 tablet 3     Current Facility-Administered Medications   Medication Dose Route Frequency Provider Last Rate Last Admin     methylPREDNISolone (DEPO-MEDROL) injection 40 mg  40 mg   Yeo, Albert, MD   40 mg at 01/11/22 1149     Past Medical History:   Diagnosis Date     Abnormal Pap smear of cervix 2012    approx date - pt reports in 10/31/18 visit notes that \"additional testing\" was performed     CARDIOVASCULAR SCREENING; LDL GOAL LESS THAN 130      Constipation      Diabetes (H) diagnosed 2016    controlled with diet only; no meds.     Diarrhea      Hypothyroidism      Past Surgical History:   Procedure Laterality Date     CHOLECYSTECTOMY  2004     COLONOSCOPY Left 6/23/2017    Procedure: COMBINED COLONOSCOPY, SINGLE OR MULTIPLE BIOPSY/POLYPECTOMY BY BIOPSY;  COLONOSCOPY with polypectomy of colon polyp by cold biopsy  ;  Surgeon: Chang Beasley MD;  Location:  GI     COLONOSCOPY N/A 11/29/2023    Procedure: Colonoscopy;  Surgeon: Hayder Reyes MD;  Location:  GI     ESOPHAGOSCOPY, GASTROSCOPY, DUODENOSCOPY (EGD), COMBINED N/A 11/29/2023    Procedure: Esophagoscopy, gastroscopy, duodenoscopy (EGD), combined;  Surgeon: Hayder Reyes MD;  Location:  GI     TUBAL LIGATION  1987     Socioeconomic History     Marital status:      Review of Systems   Constitutional: Negative.    HENT:  Negative.     Eyes: Negative.  "   Respiratory: Negative.     Cardiovascular: Negative.    Gastrointestinal:         Mild GI distress from oral iron   Endocrine:        Diabetes mellitus, hypothyroid   Genitourinary: Negative.     Musculoskeletal:  Positive for arthralgias.   Skin: Negative.    Neurological: Negative.    Hematological: Negative.    Psychiatric/Behavioral: Negative.     All other systems reviewed and are negative.      /77   Pulse 56   Temp 97.4  F (36.3  C) (Temporal)   Resp 16   Wt 96.6 kg (213 lb)   LMP  (LMP Unknown)   SpO2 93%   BMI 36.56 kg/m      Physical Exam  Vitals and nursing note reviewed. Exam conducted with a chaperone present.   Constitutional:       Appearance: Normal appearance. She is well-developed, well-groomed and overweight.      Comments: Cheerful Azerbaijani speaking woman   HENT:      Head: Normocephalic and atraumatic.      Mouth/Throat:      Mouth: Mucous membranes are moist.      Dentition: Normal dentition. No dental caries.   Eyes:      Extraocular Movements: Extraocular movements intact.      Conjunctiva/sclera: Conjunctivae normal.      Pupils: Pupils are equal, round, and reactive to light.   Cardiovascular:      Rate and Rhythm: Normal rate and regular rhythm.      Pulses: Normal pulses.      Heart sounds: Normal heart sounds.   Pulmonary:      Effort: Pulmonary effort is normal.      Breath sounds: Normal breath sounds.   Abdominal:      General: There is no distension.      Palpations: Abdomen is soft. There is no mass.      Tenderness: There is no abdominal tenderness. There is no guarding.   Musculoskeletal:         General: No swelling or deformity.      Cervical back: Normal range of motion and neck supple.   Lymphadenopathy:      Cervical: No cervical adenopathy.      Upper Body:      Right upper body: No axillary or epitrochlear adenopathy.      Left upper body: No axillary or epitrochlear adenopathy.   Skin:     General: Skin is warm and dry.   Neurological:      General: No focal  deficit present.      Mental Status: She is alert and oriented to person, place, and time.      Cranial Nerves: No cranial nerve deficit.      Motor: No weakness.      Gait: Gait normal.      Deep Tendon Reflexes: Reflexes normal.   Psychiatric:         Mood and Affect: Mood normal.         Behavior: Behavior normal. Behavior is cooperative.         Thought Content: Thought content normal.         Judgment: Judgment normal.         Recent Results (from the past 720 hour(s))   HEMOGLOBIN A1C    Collection Time: 05/28/24 11:01 AM   Result Value Ref Range    Hemoglobin A1C 6.1 (H) 0.0 - 5.6 %   Lipid panel reflex to direct LDL Fasting    Collection Time: 05/28/24 11:01 AM   Result Value Ref Range    Cholesterol 187 <200 mg/dL    Triglycerides 206 (H) <150 mg/dL    Direct Measure HDL 47 (L) >=50 mg/dL    LDL Cholesterol Calculated 99 <=100 mg/dL    Non HDL Cholesterol 140 (H) <130 mg/dL    Patient Fasting > 8hrs? No    Comprehensive metabolic panel (BMP + Alb, Alk Phos, ALT, AST, Total. Bili, TP)    Collection Time: 05/28/24 11:01 AM   Result Value Ref Range    Sodium 139 135 - 145 mmol/L    Potassium 4.2 3.4 - 5.3 mmol/L    Carbon Dioxide (CO2) 22 22 - 29 mmol/L    Anion Gap 10 7 - 15 mmol/L    Urea Nitrogen 16.1 8.0 - 23.0 mg/dL    Creatinine 0.54 0.51 - 0.95 mg/dL    GFR Estimate >90 >60 mL/min/1.73m2    Calcium 8.9 8.8 - 10.2 mg/dL    Chloride 107 98 - 107 mmol/L    Glucose 126 (H) 70 - 99 mg/dL    Alkaline Phosphatase 101 40 - 150 U/L    AST 25 0 - 45 U/L    ALT 23 0 - 50 U/L    Protein Total 7.3 6.4 - 8.3 g/dL    Albumin 4.1 3.5 - 5.2 g/dL    Bilirubin Total 0.3 <=1.2 mg/dL    Patient Fasting > 8hrs? No    TSH with free T4 reflex    Collection Time: 05/28/24 11:01 AM   Result Value Ref Range    TSH 3.35 0.30 - 4.20 uIU/mL   Vitamin D Deficiency    Collection Time: 05/28/24 11:01 AM   Result Value Ref Range    Vitamin D, Total (25-Hydroxy) 22 20 - 50 ng/mL   Iron and iron binding capacity    Collection Time:  05/28/24 11:01 AM   Result Value Ref Range    Iron 66 37 - 145 ug/dL    Iron Binding Capacity 300 240 - 430 ug/dL    Iron Sat Index 22 15 - 46 %   Ferritin    Collection Time: 05/28/24 11:01 AM   Result Value Ref Range    Ferritin 40 11 - 328 ng/mL   CBC with platelets and differential    Collection Time: 05/28/24 11:01 AM   Result Value Ref Range    WBC Count 6.3 4.0 - 11.0 10e3/uL    RBC Count 4.66 3.80 - 5.20 10e6/uL    Hemoglobin 14.0 11.7 - 15.7 g/dL    Hematocrit 43.2 35.0 - 47.0 %    MCV 93 78 - 100 fL    MCH 30.0 26.5 - 33.0 pg    MCHC 32.4 31.5 - 36.5 g/dL    RDW 13.8 10.0 - 15.0 %    Platelet Count 204 150 - 450 10e3/uL    % Neutrophils 68 %    % Lymphocytes 24 %    % Monocytes 6 %    % Eosinophils 2 %    % Basophils 0 %    % Immature Granulocytes 0 %    Absolute Neutrophils 4.3 1.6 - 8.3 10e3/uL    Absolute Lymphocytes 1.5 0.8 - 5.3 10e3/uL    Absolute Monocytes 0.4 0.0 - 1.3 10e3/uL    Absolute Eosinophils 0.1 0.0 - 0.7 10e3/uL    Absolute Basophils 0.0 0.0 - 0.2 10e3/uL    Absolute Immature Granulocytes 0.0 <=0.4 10e3/uL   Albumin Random Urine Quantitative with Creat Ratio    Collection Time: 05/28/24 11:06 AM   Result Value Ref Range    Creatinine Urine mg/dL 158.0 mg/dL    Albumin Urine mg/L 26.6 mg/L    Albumin Urine mg/g Cr 16.84 0.00 - 25.00 mg/g Cr   CBC with platelets and differential    Collection Time: 06/14/24  8:03 AM   Result Value Ref Range    WBC Count 7.5 4.0 - 11.0 10e3/uL    RBC Count 4.57 3.80 - 5.20 10e6/uL    Hemoglobin 13.8 11.7 - 15.7 g/dL    Hematocrit 42.6 35.0 - 47.0 %    MCV 93 78 - 100 fL    MCH 30.2 26.5 - 33.0 pg    MCHC 32.4 31.5 - 36.5 g/dL    RDW 14.2 10.0 - 15.0 %    Platelet Count 225 150 - 450 10e3/uL    % Neutrophils 68 %    % Lymphocytes 23 %    % Monocytes 6 %    % Eosinophils 2 %    % Basophils 0 %    % Immature Granulocytes 0 %    NRBCs per 100 WBC 0 <1 /100    Absolute Neutrophils 5.1 1.6 - 8.3 10e3/uL    Absolute Lymphocytes 1.7 0.8 - 5.3 10e3/uL    Absolute  Monocytes 0.5 0.0 - 1.3 10e3/uL    Absolute Eosinophils 0.1 0.0 - 0.7 10e3/uL    Absolute Basophils 0.0 0.0 - 0.2 10e3/uL    Absolute Immature Granulocytes 0.0 <=0.4 10e3/uL    Absolute NRBCs 0.0 10e3/uL     No results found for this or any previous visit (from the past 744 hour(s)).      Again, thank you for allowing me to participate in the care of your patient.        Sincerely,        Katalina Castillo MD

## 2024-06-17 NOTE — NURSING NOTE
"Oncology Rooming Note    June 17, 2024 9:25 AM   Ma Blaze Fallon is a 69 year old female who presents for:    Chief Complaint   Patient presents with    Oncology Clinic Visit     Initial Vitals: /77   Pulse 56   Temp 97.4  F (36.3  C) (Temporal)   Resp 16   Wt 96.6 kg (213 lb)   LMP  (LMP Unknown)   SpO2 93%   BMI 36.56 kg/m   Estimated body mass index is 36.56 kg/m  as calculated from the following:    Height as of 5/28/24: 1.626 m (5' 4\").    Weight as of this encounter: 96.6 kg (213 lb). Body surface area is 2.09 meters squared.  No Pain (0) Comment: Data Unavailable   No LMP recorded (lmp unknown). Patient is postmenopausal.  Allergies reviewed: Yes  Medications reviewed: Yes    Medications: Medication refills not needed today.  Pharmacy name entered into [employer]:    [facility] #[id number] - [address] AT [facility] [address]  [facility]Y #[id number] [facility] [address] [facility]    Frailty Screening:   Is the patient here for a new oncology consult visit in cancer care? 2. No      Clinical concerns: Follow up       Raegan Felix MA              "

## 2024-06-18 NOTE — PROGRESS NOTES
Assessment & Plan   Prior iron deficiency with anemia, corrected with oral iron    Stop oral iron  Routine CBC and provider visit one year (consider discharge to PCP)    Interval History  This is a scheduled follow up visit for this   who was diagnosed with anemia of iron deficiency last year.  She started oral iron and her numbers have all normalized.    Generally, she feels quite well, though she has some aches in her knees and left arm.  She forgot her thyroid medicines while visiting family in [place] recently but has restarted that.    ECOG = 0    Patient Active Problem List   Diagnosis    CARDIOVASCULAR SCREENING; LDL GOAL LESS THAN 70    Postmenopausal status    Bilateral ankle pain    Dermatophytosis of nail    Type 2 diabetes mellitus with obesity (H)    Hypothyroidism, unspecified type    Iron deficiency anemia, unspecified iron deficiency anemia type    Vitamin D deficiency    Deltoid tendinitis of right shoulder    Abnormal Pap smear of cervix    H. pylori infection    Class 2 severe obesity due to excess calories with serious comorbidity in adult (H)     Current Outpatient Medications   Medication Sig Dispense Refill    blood glucose (NO BRAND SPECIFIED) test strip Use to test blood sugar 1 times daily or as directed. To accompany: Blood Glucose Monitor Brands: per insurance. 100 strip 6    blood glucose monitoring (NO BRAND SPECIFIED) meter device kit Use to test blood sugar 1 times daily or as directed. Preferred blood glucose meter OR supplies to accompany: Blood Glucose Monitor Brands: per insurance. 1 kit 0    ferrous sulfate (FE TABS) 325 (65 Fe) MG EC tablet Take 1 tablet (325 mg) by mouth daily      levofloxacin (LEVAQUIN) 500 MG tablet Take 500 mg by mouth daily      levothyroxine (SYNTHROID/LEVOTHROID) 112 MCG tablet Take 1 tablet (112 mcg) by mouth daily 90 tablet 3    losartan (COZAAR) 25 MG tablet Take 1 tablet (25 mg) by mouth daily 90 tablet 3    rosuvastatin (CRESTOR) 20 MG  "tablet Take 1 tablet (20 mg) by mouth daily 90 tablet 3    thin (NO BRAND SPECIFIED) lancets Use with lanceting device. To accompany: Blood Glucose Monitor Brands: per insurance. 100 each 6    vitamin D3 (CHOLECALCIFEROL) 50 mcg (2000 units) tablet Take 2 tablets (100 mcg) by mouth daily 200 tablet 3     Current Facility-Administered Medications   Medication Dose Route Frequency Provider Last Rate Last Admin    methylPREDNISolone (DEPO-MEDROL) injection 40 mg  40 mg   Yeo, Albert, MD   40 mg at 01/11/22 1149     Past Medical History:   Diagnosis Date    Abnormal Pap smear of cervix 2012    approx date - pt reports in 10/31/18 visit notes that \"additional testing\" was performed    CARDIOVASCULAR SCREENING; LDL GOAL LESS THAN 130     Constipation     Diabetes (H) diagnosed 2016    controlled with diet only; no meds.    Diarrhea     Hypothyroidism      Past Surgical History:   Procedure Laterality Date    CHOLECYSTECTOMY  2004    COLONOSCOPY Left 6/23/2017    Procedure: COMBINED COLONOSCOPY, SINGLE OR MULTIPLE BIOPSY/POLYPECTOMY BY BIOPSY;  COLONOSCOPY with polypectomy of colon polyp by cold biopsy  ;  Surgeon: Chang Beasley MD;  Location:  GI    COLONOSCOPY N/A 11/29/2023    Procedure: Colonoscopy;  Surgeon: Hayder Reyes MD;  Location:  GI    ESOPHAGOSCOPY, GASTROSCOPY, DUODENOSCOPY (EGD), COMBINED N/A 11/29/2023    Procedure: Esophagoscopy, gastroscopy, duodenoscopy (EGD), combined;  Surgeon: Hayder Reyes MD;  Location: [facility]    TUBAL LIGATION  1987     Socioeconomic History    Marital status:      Review of Systems   Constitutional: Negative.    HENT:  Negative.     Eyes: Negative.    Respiratory: Negative.     Cardiovascular: Negative.    Gastrointestinal:         Mild GI distress from oral iron   Endocrine:        Diabetes mellitus, hypothyroid   Genitourinary: Negative.     Musculoskeletal:  Positive for arthralgias.   Skin: Negative.    Neurological: Negative.  "   Hematological: Negative.    Psychiatric/Behavioral: Negative.     All other systems reviewed and are negative.      /77   Pulse 56   Temp 97.4  F (36.3  C) (Temporal)   Resp 16   Wt 96.6 kg (213 lb)   LMP  (LMP Unknown)   SpO2 93%   BMI 36.56 kg/m      Physical Exam  Vitals and nursing note reviewed. Exam conducted with a chaperone present.   Constitutional:       Appearance: Normal appearance. She is well-developed, well-groomed and overweight.      Comments: Cheerful Chinese speaking woman   HENT:      Head: Normocephalic and atraumatic.      Mouth/Throat:      Mouth: Mucous membranes are moist.      Dentition: Normal dentition. No dental caries.   Eyes:      Extraocular Movements: Extraocular movements intact.      Conjunctiva/sclera: Conjunctivae normal.      Pupils: Pupils are equal, round, and reactive to light.   Cardiovascular:      Rate and Rhythm: Normal rate and regular rhythm.      Pulses: Normal pulses.      Heart sounds: Normal heart sounds.   Pulmonary:      Effort: Pulmonary effort is normal.      Breath sounds: Normal breath sounds.   Abdominal:      General: There is no distension.      Palpations: Abdomen is soft. There is no mass.      Tenderness: There is no abdominal tenderness. There is no guarding.   Musculoskeletal:         General: No swelling or deformity.      Cervical back: Normal range of motion and neck supple.   Lymphadenopathy:      Cervical: No cervical adenopathy.      Upper Body:      Right upper body: No axillary or epitrochlear adenopathy.      Left upper body: No axillary or epitrochlear adenopathy.   Skin:     General: Skin is warm and dry.   Neurological:      General: No focal deficit present.      Mental Status: She is alert and oriented to person, place, and time.      Cranial Nerves: No cranial nerve deficit.      Motor: No weakness.      Gait: Gait normal.      Deep Tendon Reflexes: Reflexes normal.   Psychiatric:         Mood and Affect: Mood normal.          Behavior: Behavior normal. Behavior is cooperative.         Thought Content: Thought content normal.         Judgment: Judgment normal.         Recent Results (from the past 720 hour(s))   HEMOGLOBIN A1C    Collection Time: 05/28/24 11:01 AM   Result Value Ref Range    Hemoglobin A1C 6.1 (H) 0.0 - 5.6 %   Lipid panel reflex to direct LDL Fasting    Collection Time: 05/28/24 11:01 AM   Result Value Ref Range    Cholesterol 187 <200 mg/dL    Triglycerides 206 (H) <150 mg/dL    Direct Measure HDL 47 (L) >=50 mg/dL    LDL Cholesterol Calculated 99 <=100 mg/dL    Non HDL Cholesterol 140 (H) <130 mg/dL    Patient Fasting > 8hrs? No    Comprehensive metabolic panel (BMP + Alb, Alk Phos, ALT, AST, Total. Bili, TP)    Collection Time: 05/28/24 11:01 AM   Result Value Ref Range    Sodium 139 135 - 145 mmol/L    Potassium 4.2 3.4 - 5.3 mmol/L    Carbon Dioxide (CO2) 22 22 - 29 mmol/L    Anion Gap 10 7 - 15 mmol/L    Urea Nitrogen 16.1 8.0 - 23.0 mg/dL    Creatinine 0.54 0.51 - 0.95 mg/dL    GFR Estimate >90 >60 mL/min/1.73m2    Calcium 8.9 8.8 - 10.2 mg/dL    Chloride 107 98 - 107 mmol/L    Glucose 126 (H) 70 - 99 mg/dL    Alkaline Phosphatase 101 40 - 150 U/L    AST 25 0 - 45 U/L    ALT 23 0 - 50 U/L    Protein Total 7.3 6.4 - 8.3 g/dL    Albumin 4.1 3.5 - 5.2 g/dL    Bilirubin Total 0.3 <=1.2 mg/dL    Patient Fasting > 8hrs? No    TSH with free T4 reflex    Collection Time: 05/28/24 11:01 AM   Result Value Ref Range    TSH 3.35 0.30 - 4.20 uIU/mL   Vitamin D Deficiency    Collection Time: 05/28/24 11:01 AM   Result Value Ref Range    Vitamin D, Total (25-Hydroxy) 22 20 - 50 ng/mL   Iron and iron binding capacity    Collection Time: 05/28/24 11:01 AM   Result Value Ref Range    Iron 66 37 - 145 ug/dL    Iron Binding Capacity 300 240 - 430 ug/dL    Iron Sat Index 22 15 - 46 %   Ferritin    Collection Time: 05/28/24 11:01 AM   Result Value Ref Range    Ferritin 40 11 - 328 ng/mL   CBC with platelets and differential     Collection Time: 05/28/24 11:01 AM   Result Value Ref Range    WBC Count 6.3 4.0 - 11.0 10e3/uL    RBC Count 4.66 3.80 - 5.20 10e6/uL    Hemoglobin 14.0 11.7 - 15.7 g/dL    Hematocrit 43.2 35.0 - 47.0 %    MCV 93 78 - 100 fL    MCH 30.0 26.5 - 33.0 pg    MCHC 32.4 31.5 - 36.5 g/dL    RDW 13.8 10.0 - 15.0 %    Platelet Count 204 150 - 450 10e3/uL    % Neutrophils 68 %    % Lymphocytes 24 %    % Monocytes 6 %    % Eosinophils 2 %    % Basophils 0 %    % Immature Granulocytes 0 %    Absolute Neutrophils 4.3 1.6 - 8.3 10e3/uL    Absolute Lymphocytes 1.5 0.8 - 5.3 10e3/uL    Absolute Monocytes 0.4 0.0 - 1.3 10e3/uL    Absolute Eosinophils 0.1 0.0 - 0.7 10e3/uL    Absolute Basophils 0.0 0.0 - 0.2 10e3/uL    Absolute Immature Granulocytes 0.0 <=0.4 10e3/uL   Albumin Random Urine Quantitative with Creat Ratio    Collection Time: 05/28/24 11:06 AM   Result Value Ref Range    Creatinine Urine mg/dL 158.0 mg/dL    Albumin Urine mg/L 26.6 mg/L    Albumin Urine mg/g Cr 16.84 0.00 - 25.00 mg/g Cr   CBC with platelets and differential    Collection Time: 06/14/24  8:03 AM   Result Value Ref Range    WBC Count 7.5 4.0 - 11.0 10e3/uL    RBC Count 4.57 3.80 - 5.20 10e6/uL    Hemoglobin 13.8 11.7 - 15.7 g/dL    Hematocrit 42.6 35.0 - 47.0 %    MCV 93 78 - 100 fL    MCH 30.2 26.5 - 33.0 pg    MCHC 32.4 31.5 - 36.5 g/dL    RDW 14.2 10.0 - 15.0 %    Platelet Count 225 150 - 450 10e3/uL    % Neutrophils 68 %    % Lymphocytes 23 %    % Monocytes 6 %    % Eosinophils 2 %    % Basophils 0 %    % Immature Granulocytes 0 %    NRBCs per 100 WBC 0 <1 /100    Absolute Neutrophils 5.1 1.6 - 8.3 10e3/uL    Absolute Lymphocytes 1.7 0.8 - 5.3 10e3/uL    Absolute Monocytes 0.5 0.0 - 1.3 10e3/uL    Absolute Eosinophils 0.1 0.0 - 0.7 10e3/uL    Absolute Basophils 0.0 0.0 - 0.2 10e3/uL    Absolute Immature Granulocytes 0.0 <=0.4 10e3/uL    Absolute NRBCs 0.0 10e3/uL     No results found for this or any previous visit (from the past 744 hour(s)).

## 2024-07-31 ENCOUNTER — APPOINTMENT (OUTPATIENT)
Dept: GENERAL RADIOLOGY | Facility: CLINIC | Age: 69
End: 2024-07-31
Attending: EMERGENCY MEDICINE
Payer: OTHER MISCELLANEOUS

## 2024-07-31 ENCOUNTER — HOSPITAL ENCOUNTER (EMERGENCY)
Facility: CLINIC | Age: 69
Discharge: HOME | End: 2024-07-31
Payer: OTHER MISCELLANEOUS

## 2024-07-31 ENCOUNTER — APPOINTMENT (OUTPATIENT)
Dept: GENERAL RADIOLOGY | Facility: CLINIC | Age: 69
End: 2024-07-31
Payer: OTHER MISCELLANEOUS

## 2024-07-31 VITALS
RESPIRATION RATE: 18 BRPM | OXYGEN SATURATION: 96 % | DIASTOLIC BLOOD PRESSURE: 66 MMHG | SYSTOLIC BLOOD PRESSURE: 138 MMHG | HEART RATE: 65 BPM | TEMPERATURE: 98.3 F
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
## 2024-07-31 DIAGNOSIS — S29.9XXA CHEST WALL INJURY, INITIAL ENCOUNTER: ICD-10-CM
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
## 2024-07-31 DIAGNOSIS — S89.91XA KNEE INJURY, RIGHT, INITIAL ENCOUNTER: ICD-10-CM

## 2024-07-31 PROCEDURE — 72072 X-RAY EXAM THORAC SPINE 3VWS: CPT

## 2024-07-31 PROCEDURE — 73562 X-RAY EXAM OF KNEE 3: CPT | Mod: RT

## 2024-07-31 PROCEDURE — 99285 EMERGENCY DEPT VISIT HI MDM: CPT

## 2024-07-31 PROCEDURE — 71101 X-RAY EXAM UNILAT RIBS/CHEST: CPT | Mod: RT

## 2024-07-31 PROCEDURE — 250N000013 HC RX MED GY IP 250 OP 250 PS 637: Performed by: EMERGENCY MEDICINE

## 2024-07-31 RX ORDER — OXYCODONE HYDROCHLORIDE 5 MG/1
5 TABLET ORAL EVERY 6 HOURS PRN
Qty: 12 TABLET | Refills: 0 | Status: SHIPPED | OUTPATIENT
Start: 2024-07-31 | End: 2024-08-03

## 2024-07-31 RX ORDER — IBUPROFEN 600 MG/1
600 TABLET, FILM COATED ORAL ONCE
Status: COMPLETED | OUTPATIENT
Start: 2024-07-31 | End: 2024-07-31

## 2024-07-31 RX ORDER — ACETAMINOPHEN 325 MG/1
975 TABLET ORAL ONCE
Status: COMPLETED | OUTPATIENT
Start: 2024-07-31 | End: 2024-07-31

## 2024-07-31 RX ADMIN — IBUPROFEN 600 MG: 600 TABLET ORAL at 20:51

## 2024-07-31 RX ADMIN — ACETAMINOPHEN 975 MG: 325 TABLET, FILM COATED ORAL at 20:51

## 2024-07-31 ASSESSMENT — ACTIVITIES OF DAILY LIVING (ADL)
ADLS_ACUITY_SCORE: 35

## 2024-07-31 ASSESSMENT — COLUMBIA-SUICIDE SEVERITY RATING SCALE - C-SSRS
1. IN THE PAST MONTH, HAVE YOU WISHED YOU WERE DEAD OR WISHED YOU COULD GO TO SLEEP AND NOT WAKE UP?: NO
2. HAVE YOU ACTUALLY HAD ANY THOUGHTS OF KILLING YOURSELF IN THE PAST MONTH?: NO
6. HAVE YOU EVER DONE ANYTHING, STARTED TO DO ANYTHING, OR PREPARED TO DO ANYTHING TO END YOUR LIFE?: NO

## 2024-07-31 NOTE — Clinical Note
Lakesha Fallon was seen and treated in our emergency department on 7/31/2024.  She may return to work on 08/07/2024.       If you have any questions or concerns, please don't hesitate to call.      Melanie Walker PA-C

## 2024-08-01 NOTE — ED TRIAGE NOTES
Pt was at work when she tripped on a carpet and fell, landing on her right side and injuring her right lower rib area. Pt denies hitting head and did not have LOC. Also reports abrasion to right knee/shin but is able to bear weight.      Triage Assessment (Adult)       Row Name 07/31/24 2045          Triage Assessment    Airway WDL WDL        Respiratory WDL    Respiratory WDL WDL        Skin Circulation/Temperature WDL    Skin Circulation/Temperature WDL WDL        Cardiac WDL    Cardiac WDL WDL        Peripheral/Neurovascular WDL    Peripheral Neurovascular WDL WDL        Cognitive/Neuro/Behavioral WDL    Cognitive/Neuro/Behavioral WDL WDL

## 2024-08-01 NOTE — ED PROVIDER NOTES
Emergency Department Note      History of Present Illness     Chief Complaint   Fall (r) and Rib Pain      HPI   Lakesha Fallon is a 69 year old female who presents for evaluation of a fall.  The patient states that shortly prior to arrival she was at work when she tripped on a carpet and fell hitting her chest and right knee.  She notes that she hit her chest against a bucket and this is where the majority of her pain is.  The patient denies any head injury or neck pain.  She denies any loss of consciousness, difficulty breathing, abdominal pain, or unilateral numbness/weakness.  She is not anticoagulated.    Independent Historian   None    Review of External Notes   Chart reviewed, no pertinent external notes.    Past Medical History     Medical History and Problem List   No pertinent past medical history.    Medications   Atorvastatin  Omeprazole  Ferrous sulfate    Surgical History   no pertinent past surgical history.    Physical Exam     Patient Vitals for the past 24 hrs:   BP Temp Temp src Pulse Resp SpO2   07/31/24 2128 138/66 -- -- 65 -- 96 %   07/31/24 2045 (!) 154/71 98.3  F (36.8  C) Temporal 66 18 98 %     Physical Exam  General: Alert, well developed, well nourished. Cooperative.     In moderate distress  HEENT:  Head:  Atraumatic  Ears:  External ears are normal  Eyes:   Conjunctivae normal and EOM are normal. No scleral icterus.    Pupils are equal, round, and reactive to light.   Neck:   Normal range of motion. Neck supple.  CV:  Normal rate, regular rhythm, normal heart sounds and radial pulses are 2+ and symmetric.  No murmur.  Right anterior chest wall tenderness, no overlying skin changes.  Resp:  Breath sounds are clear bilaterally    Non-labored, no retractions or accessory muscle use  GI:  Abdomen is soft, no distension, no tenderness. No rebound or guarding. No CVA tenderness bilaterally  MS:  Right lower extremity: Tenderness to palpation and ecchymosis over the anterior knee.   Full flexion/extension.  Mild effusion.  No tenderness throughout the lower extremity otherwise.  Normal dorsiflexion/plantarflexion.  5/5 strength dorsiflexion/plantarflexion.  DP pulse palpable.  Compartment soft and nontender.  Peripheral sensation intact light touch distally.  Distal CMS intact.    Normal range of motion.     Back atraumatic.    Mild tenderness to palpation over the thoracic spine.    No midline cervical or lumbar tenderness  Skin:  Warm and dry.  No rash or lesions noted.  Neuro:   Alert. Normal strength.   Psych: Normal mood and affect.    Diagnostics     Imaging   Thoracic spine XR, 3 views   Final Result   IMPRESSION: Mild wedging of several mid thoracic vertebral bodies. No definite fracture. Normal vertebral body heights. Slight upper left and mid right thoracic curve. Normal extraspinal structures. Surgical clips right upper quadrant.       XR Knee Right 3 Views   Final Result   IMPRESSION: Negative for fracture or dislocation. Moderate tricompartmental degenerative arthritis. Questionable trace knee joint effusion. Remainder unremarkable.      Ribs XR, unilat 3 views + PA chest, right   Final Result   IMPRESSION:        No acute, displaced right rib fractures are identified. Heart size is normal. No pneumothorax or focal pulmonary consolidation.          Independent Interpretation   None    ED Course      Medications Administered   Medications   ibuprofen (ADVIL/MOTRIN) tablet 600 mg (600 mg Oral $Given 7/31/24 2051)   acetaminophen (TYLENOL) tablet 975 mg (975 mg Oral $Given 7/31/24 2051)       Procedures   Procedures     Discussion of Management   None    ED Course        Additional Documentation  None    Medical Decision Making / Diagnosis     CMS Diagnoses: None    MIPS       None    [facility]   Lakesha Fallon is a 69 year old female who presents for evaluation of a fall.  On exam, the patient had tenderness to palpation over the right anterior chest wall, thoracic spine, and right  knee.  She otherwise has regular rate and rhythm with clear breath sounds bilaterally.  She does not have any evidence of neurovascular compromise.  The patient denied any head injury or loss of consciousness from the fall and therefore advanced imaging of the head was deferred.  Rib x-rays negative for any obvious fracture or pneumothorax.  Thoracic spine x-ray shows no evidence of acute fracture either.  The right knee x-ray is also reassuring and negative.  We provided the patient with Tylenol and ibuprofen here and offered other pain medication which she declined.  We discussed the importance of managing pain of the chest wall injury in order to avoid potential pneumonia in the future.  A prescription for oxycodone was sent to the patient's pharmacy and she was also advised to continue Tylenol/ibuprofen as needed for pain.  He has provided the patient with an incentive spirometer to ensure adequate deep breaths during the healing process.  The patient was advised to follow-up with her primary care provider this week for reevaluation and ongoing management of symptoms.  She should return to the ER for fever, cough, difficulty breathing, increased pain, vomiting, syncope, or any other new concerns.  The patient and her family were comfortable with this plan and all questions were answered.    Disposition   The patient was discharged.     Diagnosis     ICD-10-CM    1. Chest wall injury, initial encounter  S29.9XXA Other Respiratory Supplies for DME - ONLY FOR DME      2. Knee injury, right, initial encounter  S89.91XA       3. Fall at home, initial encounter  W19.XXXA     Y92.009            Discharge Medications   New Prescriptions    OXYCODONE (ROXICODONE) 5 MG TABLET    Take 1 tablet (5 mg) by mouth every 6 hours as needed for moderate to severe pain         MANN Sharma Carley J, PA-C  08/01/24 0019

## 2024-08-05 ENCOUNTER — APPOINTMENT (OUTPATIENT)
Dept: INTERPRETER SERVICES | Facility: CLINIC | Age: 69
End: 2024-08-05
Payer: COMMERCIAL

## 2024-08-08 ENCOUNTER — OFFICE VISIT (OUTPATIENT)
Dept: FAMILY MEDICINE | Facility: CLINIC | Age: 69
End: 2024-08-08
Payer: OTHER MISCELLANEOUS

## 2024-08-08 VITALS
OXYGEN SATURATION: 97 % | SYSTOLIC BLOOD PRESSURE: 138 MMHG | HEART RATE: 63 BPM | DIASTOLIC BLOOD PRESSURE: 82 MMHG | BODY MASS INDEX: 35.51 KG/M2 | RESPIRATION RATE: 11 BRPM | HEIGHT: 64 IN | WEIGHT: 208 LBS | TEMPERATURE: 97.7 F

## 2024-08-08 DIAGNOSIS — R07.81 RIB PAIN: Primary | ICD-10-CM

## 2024-08-08 PROCEDURE — 99213 OFFICE O/P EST LOW 20 MIN: CPT | Performed by: FAMILY MEDICINE

## 2024-08-08 PROCEDURE — G2211 COMPLEX E/M VISIT ADD ON: HCPCS | Performed by: FAMILY MEDICINE

## 2024-08-08 RX ORDER — ACETAMINOPHEN 500 MG
1000 TABLET ORAL EVERY 8 HOURS PRN
Qty: 100 TABLET | Refills: 0 | Status: SHIPPED | OUTPATIENT
Start: 2024-08-08

## 2024-08-08 RX ORDER — NAPROXEN 500 MG/1
500 TABLET ORAL 2 TIMES DAILY WITH MEALS
Qty: 30 TABLET | Refills: 0 | Status: SHIPPED | OUTPATIENT
Start: 2024-08-08

## 2024-08-08 ASSESSMENT — PAIN SCALES - GENERAL: PAINLEVEL: WORST PAIN (10)

## 2024-08-08 NOTE — LETTER
August 8, 2024      Quoc Fallon  [address] [address]        To Whom It May Concern:    Quoc Fallon was seen in our clinic. She may return to work with the following: bending no more than twice hourly, lifting limitation 25 pounds through 8/15/24.  After this she may work without restrictions.      Sincerely,          Ruslan Dejesus

## 2024-10-22 DIAGNOSIS — E03.9 HYPOTHYROIDISM, UNSPECIFIED TYPE: ICD-10-CM

## 2024-10-22 RX ORDER — LEVOTHYROXINE SODIUM 112 UG/1
112 TABLET ORAL DAILY
Qty: 90 TABLET | Refills: 1 | Status: SHIPPED | OUTPATIENT
Start: 2024-10-22

## 2024-11-20 ENCOUNTER — OFFICE VISIT (OUTPATIENT)
Dept: GASTROENTEROLOGY | Facility: CLINIC | Age: 69
End: 2024-11-20
Attending: STUDENT IN AN ORGANIZED HEALTH CARE EDUCATION/TRAINING PROGRAM
Payer: COMMERCIAL

## 2024-11-20 VITALS
WEIGHT: 216 LBS | BODY MASS INDEX: 36.88 KG/M2 | DIASTOLIC BLOOD PRESSURE: 77 MMHG | HEART RATE: 65 BPM | OXYGEN SATURATION: 98 % | SYSTOLIC BLOOD PRESSURE: 157 MMHG | HEIGHT: 64 IN

## 2024-11-20 DIAGNOSIS — R03.0 ELEVATED BLOOD PRESSURE READING: ICD-10-CM

## 2024-11-20 DIAGNOSIS — R10.13 EPIGASTRIC PAIN: Primary | ICD-10-CM

## 2024-11-20 RX ORDER — FAMOTIDINE 20 MG/1
20 TABLET, FILM COATED ORAL
Qty: 90 TABLET | Refills: 3 | Status: SHIPPED | OUTPATIENT
Start: 2024-11-20

## 2024-11-20 ASSESSMENT — PAIN SCALES - GENERAL: PAINLEVEL_OUTOF10: NO PAIN (0)

## 2024-11-20 NOTE — LETTER
11/20/2024      Quoc Fallon  [address] [address]      Dear Colleague,    Thank you for referring your patient, Quoc Fallon, to the [facility] [place]. Please see a copy of my visit note below.    GI CLINIC VISIT    CC: Follow up    ASSESSMENT/PLAN:  1. Iron deficiency anemia, unspecified iron deficiency anemia type  2. H. pylori infection    Ms. Jorge Fallon had workup for iron deficiency anemia with colonoscopy and EGD at the end of November 2023 which showed non-bleeding gastritis and was positive for H pylori. She was treated with bismuth quadruple therapy and was able to complete the entire treatment. She completed eradication testing in early February 2024, which remained positive and re-treatment was initiated with levofloxacin triple therapy.      She has also been seen by hematology for iron deficiency anemia. She had initially been found at a routine clinic visit on 8/16/23 to have Hgb of 7.3, MCV 64. Repeat Hgb on 8/18/23 with Hgb of 10.0, MCV 78. She had a blood transfusion in the ER. She was started on oral iron and hemoglobin improved to 13.3. Plan is to repeat CBC in 6/2024.      After completing most recent 14 day H pylori treatment she started feeling gastritis again, describes as a little bit of aching in the epigastric region of the abdomen that lingers. Also having heartburn. She plans to do repeat stool test for eradication on or after April 2nd (next week). She is not having dysphagia, unintentional weight loss, melena.     H pylori testing for eradication was negative. She then completed 2 months of omeprazole and found this to be helpful. She has  come off PPI without any residual epigastric pain or heartburn, other than once in a while some heartburn if she eats chili. Hgb has since normalized.    Her blood pressure was elevated in clinic today at 157/77. I recommend that they obtain a home blood pressure device with  an arm cuff over the counter. Recommended taking their blood pressure at home after they have been seated and resting for at least 10 minutes. Discussed proper position seated upright with feel flat on the floor and uncrossed legs. They should take their blood pressure once daily for 2 weeks and record the readings, and bring these readings along with their home blood pressure device into their primary care clinic for a device check to ensure accuracy. This can help differentiate white coat hypertension from true hypertension.      We reviewed the following plan:   - Recommend getting a home blood pressure device and checking once daily for a few weeks and record these numbers. Bring this with your to your primary care provider along with the home blood pressure device to your visit so they can check it for accuracy. 130/80 is your goal blood pressure.  - Pepcid (famotidine) prescribed for you take as needed for heartburn or acid reflux at bedtime   - Follow up in 6 months     # Colon cancer screening   Colonoscopy 11/2023 with no polyps, recommended to repeat in 7 years (history of adenomatous polyp).      RTC 6 months    It was a pleasure to participate in the care of this patient; please contact us with any further questions.  A total of 20 face-to-face minutes was spent with this patient, >50% of which was counseling regarding the above delineated issues. An additional 7 minutes was spent on the date of the encounter doing chart review, documentation, and further activities as noted above.    Agnieszka Dumont PA-C  Division of Gastroenterology, Hepatology and Nutrition  [facility]    ---------------------------------------------------------------------------------------------------  HPI:  Quoc Fallon is a 69 year old female with past medical history significant for type 2 diabetes, hypothyroidism,  who presents for  epigastric pain, heartburn, H pylori, iron deficiency.      Initial history  3/2024:   Ms. Jroge Fallon had workup for iron deficiency anemia with colonoscopy and EGD at the end of November 2023 which showed non-bleeding gastritis and was positive for H pylori. She was then treated with bismuth quadruple therapy and was able to complete the entire treatment. She completed eradication testing in early February 2024, which remained positive. Notes she has five others in her household.       She has been seen by hematology for iron deficiency anemia. She has a history of this in the past as well. She had initially been found at a routine clinic visit on 8/16/23 to have Hgb of 7.3, MCV 64. Repeat Hgb on 8/18/23 with Hgb of 10.0, MCV 78. She had a blood transfusion in the ER. She was started on oral iron and hemoglobin improved to 13.3. Plan is to repeat CBC in 6/2024.      Today, she reports after 14 day treatment started feeling gastritis again, describes as a little bit of aching in the epigastric region of the abdomen that lingers. This pain was there before H pylori treatment and is a little bit better now. Seems to be worse around 10 pm. She works from 3:30 pm - 1 am and has a lunch break around 9 pm when she eats fruits including blueberries, raspberries, and kiwi. Around 10 pm she starts to notice heartburn and epigastric pain. Once she gets home and goes to sleep, she has no pain and it does not wake her from sleep. She also feels acid reflux/acid sensation sitting in the chest which feels like heartburn. She otherwise prepares her meals at home and eats healthy, avoids spicy foods. She stopped PPI when she stopped H pylori treatment and plans to have H pylori eradication testing on or after 4/2 (next week). She denies dysphagia to solids or liquids. Continues on oral iron.     Interval history 11/2024:   She took the omeprazole for 2 months. She is feeling well. She denies any abdominal pain, or heartburn. Does have some heartburn here and there or acid coming up in the night. She felt  "PPI was helpful. She has a good appetite. Denies early satiety. No dysphagia. Will wake with some acidity in her esophagus from sleep very rarely.    Bowel movements are regular.     ROS:    A 10 point review of systems was performed and is negative except as noted in the HPI.     PROBLEM LIST  Patient Active Problem List    Diagnosis Date Noted     Class 2 severe obesity due to excess calories with serious comorbidity in adult (H) 05/28/2024     Priority: Medium     H. pylori infection 03/27/2024     Priority: Medium     Deltoid tendinitis of right shoulder 11/06/2018     Priority: Medium     Vitamin D deficiency 06/13/2017     Priority: Medium     Type 2 diabetes mellitus with obesity (H) 05/25/2016     Priority: Medium     Hypothyroidism, unspecified type 05/25/2016     Priority: Medium     Iron deficiency anemia, unspecified iron deficiency anemia type 05/25/2016     Priority: Medium     Postmenopausal status 03/23/2016     Priority: Medium     Bilateral ankle pain 03/23/2016     Priority: Medium     Dermatophytosis of nail 03/23/2016     Priority: Medium     CARDIOVASCULAR SCREENING; LDL GOAL LESS THAN 70      Priority: Medium     Abnormal Pap smear of cervix 01/01/2012     Priority: Medium     2012 (approx date) = abnormal pap. Per 10/31/18 visit notes: \"Patient states approx 6 years ago she had abnormal Pap followed up with 'additional testing' She does not endorse ablative therapy being performed.\"  2016 NIL pap  2018 NIL pap, neg HR HPV. Plan 3 year cotest           PHYSICAL EXAMINATION:  Vitals BP (!) 157/77   Pulse 65   Ht 1.626 m (5' 4\")   Wt 98 kg (216 lb)   LMP  (LMP Unknown)   SpO2 98%   BMI 37.08 kg/m     Wt   Wt Readings from Last 2 Encounters:   11/20/24 98 kg (216 lb)   08/08/24 94.3 kg (208 lb)      Gen: Pt sitting up on exam table in NAD, interactive and cooperative on exam  Eyes: sclerae anicteric, no injection  Resp: Unlabored breathing   Skin: Warm, dry, no jaundice, nails appear " healthy  Neuro: alert, oriented, answers questions appropriately      Again, thank you for allowing me to participate in the care of your patient.        Sincerely,        Agnieszka Dumont PA-C

## 2024-11-20 NOTE — NURSING NOTE
"Chief Complaint   Patient presents with    Follow Up       Vitals:    11/20/24 1033   BP: (!) 157/77   Pulse: 65   SpO2: 98%   Weight: 98 kg (216 lb)   Height: 1.626 m (5' 4\")       Body mass index is 37.08 kg/m .    Riccardo Lutz MA    "

## 2024-11-20 NOTE — PATIENT INSTRUCTIONS
It was a pleasure meeting with you today and discussing your healthcare plan. Below is a summary of what we covered:    - Recommend getting a home blood pressure device and checking once daily for a few weeks and record these numbers. Bring this with your to your primary care provider along with the home blood pressure device to your visit so they can check it for accuracy. 130/80 is your goal blood pressure.  - Pepcid (famotidine) prescribed for you take as needed for heartburn or acid reflux at bedtime   - Follow up in 6 months    Please see below for any additional questions and scheduling guidelines.    Sign up for [employer]: [employer] patient portal serves as a secure platform for accessing your medical records from the [facility]. Additionally, [employer] facilitates easy, timely, and secure messaging with your care team. If you have not signed up, you may do so by using the provided code or calling [phone].    Coordinating your care after your visit:  There are multiple options for scheduling your follow-up care based on your provider's recommendation.    How do I schedule a follow-up clinic appointment:   After your appointment, you may receive scheduling assistance with the Clinic Coordinators by having a seat in the waiting room and a Clinic Coordinator will call you up to schedule.  Virtual visits or after you leave the clinic:  Your provider has placed a follow-up order in the [employer] portal for scheduling your return appointment. A member of the scheduling team will contact you to schedule.  [place] Scheduling: Timely scheduling through [employer] is advised to ensure appointment availability.   Call to schedule: You may schedule your follow-up appointment(s) by calling [phone], option 1.    How do I schedule my endoscopy or colonoscopy procedure:  If a procedure, such as a colonoscopy or upper endoscopy was ordered by your provider, the scheduling team will contact you to schedule this  procedure. Or you may choose to call to schedule at   [phone], option 2.  Please allow 20-30 minutes when scheduling a procedure.    How do I get my blood work done? To get your blood work done, you need to schedule a lab appointment at an [facility]. There are multiple ways to schedule:   At the clinic: The Clinic Coordinator you meet after your visit can help you schedule a lab appointment.   "[employer] scheduling: "[employer] offers online lab scheduling at all [facility] laboratory locations.   Call to schedule: You can call [phone] to schedule your lab appointment.    How do I schedule my imaging study: To schedule imaging studies, such as CT scans, ultrasounds, MRIs, or X-rays, contact Imaging Services at [phone].    How do I schedule a referral to another doctor: If your provider recommended a referral to another specialist(s), the referral order was placed by your provider. You will receive a phone call to schedule this referral, or you may choose to call the number attached to the referral to self-schedule.    For Post-Visit Question(s):  For any inquiries following today's visit:  Please utilize "[employer] messaging and allow 48 hours for reply or contact the Call Center during normal business hours at [phone], option 3.  For Emergent After-hours questions, contact the On-Call GI Fellow through the [facility]  at [phone].  In addition, you may contact your Nurse directly using the provided contact information.    Test Results:  Test results will be accessible via "[employer] in compliance with the 21st Century Cures Act. This means that your results will be available to you at the same time as your provider. Often you may see your results before your provider does. Results are reviewed by staff within two weeks with communication follow-up. Results may be released in the patient portal prior to your care team review.    Prescription Refill(s):   Medication prescribed by your provider will be addressed during your visit. For future refills, please coordinate with your pharmacy. If you have not had a recent clinic visit or routine labs, for your safety, your provider may not be able to refill your prescription.

## 2024-11-20 NOTE — PROGRESS NOTES
GI CLINIC VISIT    CC: Follow up    ASSESSMENT/PLAN:  1. Iron deficiency anemia, unspecified iron deficiency anemia type  2. H. pylori infection    Ms. Jorge Fallon had workup for iron deficiency anemia with colonoscopy and EGD at the end of November 2023 which showed non-bleeding gastritis and was positive for H pylori. She was treated with bismuth quadruple therapy and was able to complete the entire treatment. She completed eradication testing in early February 2024, which remained positive and re-treatment was initiated with levofloxacin triple therapy.      She has also been seen by hematology for iron deficiency anemia. She had initially been found at a routine clinic visit on 8/16/23 to have Hgb of 7.3, MCV 64. Repeat Hgb on 8/18/23 with Hgb of 10.0, MCV 78. She had a blood transfusion in the ER. She was started on oral iron and hemoglobin improved to 13.3. Plan is to repeat CBC in 6/2024.      After completing most recent 14 day H pylori treatment she started feeling gastritis again, describes as a little bit of aching in the epigastric region of the abdomen that lingers. Also having heartburn. She plans to do repeat stool test for eradication on or after April 2nd (next week). She is not having dysphagia, unintentional weight loss, melena.     H pylori testing for eradication was negative. She then completed 2 months of omeprazole and found this to be helpful. She has  come off PPI without any residual epigastric pain or heartburn, other than once in a while some heartburn if she eats chili. Hgb has since normalized.    Her blood pressure was elevated in clinic today at 157/77. I recommend that they obtain a home blood pressure device with an arm cuff over the counter. Recommended taking their blood pressure at home after they have been seated and resting for at least 10 minutes. Discussed proper position seated upright with feel flat on the floor and uncrossed legs. They should take their blood pressure  once daily for 2 weeks and record the readings, and bring these readings along with their home blood pressure device into their primary care clinic for a device check to ensure accuracy. This can help differentiate white coat hypertension from true hypertension.      We reviewed the following plan:   - Recommend getting a home blood pressure device and checking once daily for a few weeks and record these numbers. Bring this with your to your primary care provider along with the home blood pressure device to your visit so they can check it for accuracy. 130/80 is your goal blood pressure.  - Pepcid (famotidine) prescribed for you take as needed for heartburn or acid reflux at bedtime   - Follow up in 6 months     # Colon cancer screening   Colonoscopy 11/2023 with no polyps, recommended to repeat in 7 years (history of adenomatous polyp).      RTC 6 months    It was a pleasure to participate in the care of this patient; please contact us with any further questions.  A total of 20 face-to-face minutes was spent with this patient, >50% of which was counseling regarding the above delineated issues. An additional 7 minutes was spent on the date of the encounter doing chart review, documentation, and further activities as noted above.    Agnieszka Dumont PA-C  Division of Gastroenterology, Hepatology and Nutrition  [facility]    ---------------------------------------------------------------------------------------------------  HPI:  Quoc Fallon is a 69 year old female with past medical history significant for type 2 diabetes, hypothyroidism,  who presents for  epigastric pain, heartburn, H pylori, iron deficiency.      Initial history 3/2024:   Ms. Jorge Fallon had workup for iron deficiency anemia with colonoscopy and EGD at the end of November 2023 which showed non-bleeding gastritis and was positive for H pylori. She was then treated with bismuth quadruple therapy and was able to complete the  entire treatment. She completed eradication testing in early February 2024, which remained positive. Notes she has five others in her household.       She has been seen by hematology for iron deficiency anemia. She has a history of this in the past as well. She had initially been found at a routine clinic visit on 8/16/23 to have Hgb of 7.3, MCV 64. Repeat Hgb on 8/18/23 with Hgb of 10.0, MCV 78. She had a blood transfusion in the ER. She was started on oral iron and hemoglobin improved to 13.3. Plan is to repeat CBC in 6/2024.      Today, she reports after 14 day treatment started feeling gastritis again, describes as a little bit of aching in the epigastric region of the abdomen that lingers. This pain was there before H pylori treatment and is a little bit better now. Seems to be worse around 10 pm. She works from 3:30 pm - 1 am and has a lunch break around 9 pm when she eats fruits including blueberries, raspberries, and kiwi. Around 10 pm she starts to notice heartburn and epigastric pain. Once she gets home and goes to sleep, she has no pain and it does not wake her from sleep. She also feels acid reflux/acid sensation sitting in the chest which feels like heartburn. She otherwise prepares her meals at home and eats healthy, avoids spicy foods. She stopped PPI when she stopped H pylori treatment and plans to have H pylori eradication testing on or after 4/2 (next week). She denies dysphagia to solids or liquids. Continues on oral iron.     Interval history 11/2024:   She took the omeprazole for 2 months. She is feeling well. She denies any abdominal pain, or heartburn. Does have some heartburn here and there or acid coming up in the night. She felt PPI was helpful. She has a good appetite. Denies early satiety. No dysphagia. Will wake with some acidity in her esophagus from sleep very rarely.    Bowel movements are regular.     ROS:    A 10 point review of systems was performed and is negative except as noted  "in the HPI.     PROBLEM LIST  Patient Active Problem List    Diagnosis Date Noted    Class 2 severe obesity due to excess calories with serious comorbidity in adult (H) 05/28/2024     Priority: Medium    H. pylori infection 03/27/2024     Priority: Medium    Deltoid tendinitis of right shoulder 11/06/2018     Priority: Medium    Vitamin D deficiency 06/13/2017     Priority: Medium    Type 2 diabetes mellitus with obesity (H) 05/25/2016     Priority: Medium    Hypothyroidism, unspecified type 05/25/2016     Priority: Medium    Iron deficiency anemia, unspecified iron deficiency anemia type 05/25/2016     Priority: Medium    Postmenopausal status 03/23/2016     Priority: Medium    Bilateral ankle pain 03/23/2016     Priority: Medium    Dermatophytosis of nail 03/23/2016     Priority: Medium    CARDIOVASCULAR SCREENING; LDL GOAL LESS THAN 70      Priority: Medium    Abnormal Pap smear of cervix 01/01/2012     Priority: Medium     2012 (approx date) = abnormal pap. Per 10/31/18 visit notes: \"Patient states approx 6 years ago she had abnormal Pap followed up with 'additional testing' She does not endorse ablative therapy being performed.\"  2016 NIL pap  2018 NIL pap, neg HR HPV. Plan 3 year cotest           PHYSICAL EXAMINATION:  Vitals BP (!) 157/77   Pulse 65   Ht 1.626 m (5' 4\")   Wt 98 kg (216 lb)   LMP  (LMP Unknown)   SpO2 98%   BMI 37.08 kg/m     Wt   Wt Readings from Last 2 Encounters:   11/20/24 98 kg (216 lb)   08/08/24 94.3 kg (208 lb)      Gen: Pt sitting up on exam table in NAD, interactive and cooperative on exam  Eyes: sclerae anicteric, no injection  Resp: Unlabored breathing   Skin: Warm, dry, no jaundice, nails appear healthy  Neuro: alert, oriented, answers questions appropriately  "

## 2024-12-04 ENCOUNTER — TELEPHONE (OUTPATIENT)
Dept: GASTROENTEROLOGY | Facility: CLINIC | Age: 69
End: 2024-12-04
Payer: COMMERCIAL

## 2024-12-04 NOTE — TELEPHONE ENCOUNTER
1st attempt- unable to lvm. Sent Letter (1st attempt) for the patient to call back and schedule the following:    Appointment type: Return GI  Provider: Agnieszka Dumont   Return date: 6 mo (around May 2025)  Specialty phone number: [phone]  Additional appointment(s) needed: NA  Additonal Notes: attempted preferred number several times with assistance of . Phone rang and then goes silent, unable to lvm. No myc. Sent Letter x1

## 2024-12-26 ENCOUNTER — PATIENT OUTREACH (OUTPATIENT)
Dept: CARE COORDINATION | Facility: CLINIC | Age: 69
End: 2024-12-26
Payer: COMMERCIAL

## 2025-03-25 ENCOUNTER — OFFICE VISIT (OUTPATIENT)
Dept: INTERNAL MEDICINE | Facility: CLINIC | Age: 70
End: 2025-03-25
Payer: COMMERCIAL

## 2025-03-25 VITALS
RESPIRATION RATE: 20 BRPM | TEMPERATURE: 96.9 F | SYSTOLIC BLOOD PRESSURE: 152 MMHG | BODY MASS INDEX: 42.94 KG/M2 | HEART RATE: 65 BPM | HEIGHT: 59 IN | DIASTOLIC BLOOD PRESSURE: 78 MMHG | WEIGHT: 213 LBS

## 2025-03-25 DIAGNOSIS — I10 BENIGN ESSENTIAL HYPERTENSION: ICD-10-CM

## 2025-03-25 DIAGNOSIS — E66.9 TYPE 2 DIABETES MELLITUS WITH OBESITY (H): ICD-10-CM

## 2025-03-25 DIAGNOSIS — E55.9 VITAMIN D DEFICIENCY: ICD-10-CM

## 2025-03-25 DIAGNOSIS — Z00.00 ROUTINE GENERAL MEDICAL EXAMINATION AT A HEALTH CARE FACILITY: Primary | ICD-10-CM

## 2025-03-25 DIAGNOSIS — E78.1 HYPERTRIGLYCERIDEMIA: ICD-10-CM

## 2025-03-25 DIAGNOSIS — E66.01 MORBID OBESITY (H): ICD-10-CM

## 2025-03-25 DIAGNOSIS — M25.562 LEFT KNEE PAIN, UNSPECIFIED CHRONICITY: ICD-10-CM

## 2025-03-25 DIAGNOSIS — E03.9 HYPOTHYROIDISM, UNSPECIFIED TYPE: ICD-10-CM

## 2025-03-25 DIAGNOSIS — Z12.31 ENCOUNTER FOR SCREENING MAMMOGRAM FOR BREAST CANCER: ICD-10-CM

## 2025-03-25 DIAGNOSIS — E11.69 TYPE 2 DIABETES MELLITUS WITH OBESITY (H): ICD-10-CM

## 2025-03-25 DIAGNOSIS — E78.00 HYPERCHOLESTEREMIA: ICD-10-CM

## 2025-03-25 PROBLEM — E66.812 CLASS 2 SEVERE OBESITY DUE TO EXCESS CALORIES WITH SERIOUS COMORBIDITY IN ADULT (H): Status: RESOLVED | Noted: 2024-05-28 | Resolved: 2025-03-25

## 2025-03-25 LAB
ALBUMIN SERPL BCG-MCNC: 4.1 G/DL (ref 3.5–5.2)
ALP SERPL-CCNC: 112 U/L (ref 40–150)
ALT SERPL W P-5'-P-CCNC: 20 U/L (ref 0–50)
ANION GAP SERPL CALCULATED.3IONS-SCNC: 11 MMOL/L (ref 7–15)
AST SERPL W P-5'-P-CCNC: 22 U/L (ref 0–45)
BASOPHILS # BLD AUTO: 0 10E3/UL (ref 0–0.2)
BASOPHILS NFR BLD AUTO: 0 %
BILIRUB SERPL-MCNC: 0.4 MG/DL
BUN SERPL-MCNC: 13.5 MG/DL (ref 8–23)
CALCIUM SERPL-MCNC: 9.4 MG/DL (ref 8.8–10.4)
CHLORIDE SERPL-SCNC: 106 MMOL/L (ref 98–107)
CHOLEST SERPL-MCNC: 195 MG/DL
CREAT SERPL-MCNC: 0.56 MG/DL (ref 0.51–0.95)
EGFRCR SERPLBLD CKD-EPI 2021: >90 ML/MIN/1.73M2
EOSINOPHIL # BLD AUTO: 0.1 10E3/UL (ref 0–0.7)
EOSINOPHIL NFR BLD AUTO: 2 %
ERYTHROCYTE [DISTWIDTH] IN BLOOD BY AUTOMATED COUNT: 14.2 % (ref 10–15)
EST. AVERAGE GLUCOSE BLD GHB EST-MCNC: 143 MG/DL
FASTING STATUS PATIENT QL REPORTED: YES
FASTING STATUS PATIENT QL REPORTED: YES
GLUCOSE SERPL-MCNC: 141 MG/DL (ref 70–99)
HBA1C MFR BLD: 6.6 % (ref 0–5.6)
HCO3 SERPL-SCNC: 23 MMOL/L (ref 22–29)
HCT VFR BLD AUTO: 41.6 % (ref 35–47)
HDLC SERPL-MCNC: 43 MG/DL
HGB BLD-MCNC: 13.8 G/DL (ref 11.7–15.7)
IMM GRANULOCYTES # BLD: 0 10E3/UL
IMM GRANULOCYTES NFR BLD: 0 %
LDLC SERPL CALC-MCNC: 107 MG/DL
LYMPHOCYTES # BLD AUTO: 1.5 10E3/UL (ref 0.8–5.3)
LYMPHOCYTES NFR BLD AUTO: 20 %
MCH RBC QN AUTO: 29.9 PG (ref 26.5–33)
MCHC RBC AUTO-ENTMCNC: 33.2 G/DL (ref 31.5–36.5)
MCV RBC AUTO: 90 FL (ref 78–100)
MONOCYTES # BLD AUTO: 0.4 10E3/UL (ref 0–1.3)
MONOCYTES NFR BLD AUTO: 5 %
NEUTROPHILS # BLD AUTO: 5.3 10E3/UL (ref 1.6–8.3)
NEUTROPHILS NFR BLD AUTO: 73 %
NONHDLC SERPL-MCNC: 152 MG/DL
PLATELET # BLD AUTO: 198 10E3/UL (ref 150–450)
POTASSIUM SERPL-SCNC: 4.4 MMOL/L (ref 3.4–5.3)
PROT SERPL-MCNC: 7.7 G/DL (ref 6.4–8.3)
RBC # BLD AUTO: 4.62 10E6/UL (ref 3.8–5.2)
SODIUM SERPL-SCNC: 140 MMOL/L (ref 135–145)
TRIGL SERPL-MCNC: 225 MG/DL
TSH SERPL DL<=0.005 MIU/L-ACNC: 2.25 UIU/ML (ref 0.3–4.2)
VIT D+METAB SERPL-MCNC: 19 NG/ML (ref 20–50)
WBC # BLD AUTO: 7.3 10E3/UL (ref 4–11)

## 2025-03-25 PROCEDURE — 85025 COMPLETE CBC W/AUTO DIFF WBC: CPT | Performed by: NURSE PRACTITIONER

## 2025-03-25 PROCEDURE — 82306 VITAMIN D 25 HYDROXY: CPT | Performed by: NURSE PRACTITIONER

## 2025-03-25 PROCEDURE — 80053 COMPREHEN METABOLIC PANEL: CPT | Performed by: NURSE PRACTITIONER

## 2025-03-25 PROCEDURE — 84443 ASSAY THYROID STIM HORMONE: CPT | Performed by: NURSE PRACTITIONER

## 2025-03-25 PROCEDURE — 80061 LIPID PANEL: CPT | Performed by: NURSE PRACTITIONER

## 2025-03-25 PROCEDURE — 3077F SYST BP >= 140 MM HG: CPT | Performed by: NURSE PRACTITIONER

## 2025-03-25 PROCEDURE — 36415 COLL VENOUS BLD VENIPUNCTURE: CPT | Performed by: NURSE PRACTITIONER

## 2025-03-25 PROCEDURE — 3078F DIAST BP <80 MM HG: CPT | Performed by: NURSE PRACTITIONER

## 2025-03-25 PROCEDURE — 99397 PER PM REEVAL EST PAT 65+ YR: CPT | Performed by: NURSE PRACTITIONER

## 2025-03-25 PROCEDURE — 83036 HEMOGLOBIN GLYCOSYLATED A1C: CPT | Performed by: NURSE PRACTITIONER

## 2025-03-25 PROCEDURE — 99214 OFFICE O/P EST MOD 30 MIN: CPT | Mod: 25 | Performed by: NURSE PRACTITIONER

## 2025-03-25 RX ORDER — LEVOTHYROXINE SODIUM 112 UG/1
112 TABLET ORAL DAILY
Qty: 90 TABLET | Refills: 3 | Status: SHIPPED | OUTPATIENT
Start: 2025-03-25

## 2025-03-25 RX ORDER — ROSUVASTATIN CALCIUM 20 MG/1
20 TABLET, COATED ORAL DAILY
Qty: 90 TABLET | Refills: 3 | Status: SHIPPED | OUTPATIENT
Start: 2025-03-25

## 2025-03-25 RX ORDER — LANCETS
EACH MISCELLANEOUS
Qty: 100 EACH | Refills: 11 | Status: SHIPPED | OUTPATIENT
Start: 2025-03-25

## 2025-03-25 RX ORDER — LOSARTAN POTASSIUM 50 MG/1
50 TABLET ORAL DAILY
Qty: 30 TABLET | Refills: 1 | Status: SHIPPED | OUTPATIENT
Start: 2025-03-25

## 2025-03-25 ASSESSMENT — ANXIETY QUESTIONNAIRES: GAD7 TOTAL SCORE: INCOMPLETE

## 2025-03-25 NOTE — PATIENT INSTRUCTIONS
"Lab in [address]    Losartan 50 mg daily for blood pressure     Recheck blood pressure in 3-4 weeks   As well as lab     To schedule your mammogram, you may call the breast center at [phone].       Crestor 20 mg daily for cholesterol     Eye referral     Ortho referral         Patient Education   Consejos de atención preventiva  Se trata de consejos generales que solemos ofrecer para ayudar a las personas a mantenerse saludables. Vaughan equipo de atención puede darle consejos específicos. Hable con ellos sobre lien propias necesidades de atención preventiva.  Estilo de josé antonio  Ejercítese, augustus mínimo, 150 minutos a la semana (30 minutos al día, 5 días a la semana).  Realice actividades de fortalecimiento muscular 2 días a la semana, ya que contribuyen al control del peso y a la prevención de enfermedades.  No fume.  Use protector solar para prevenir el cáncer de piel.  Cada 2 a 5 años, realice pruebas de detección de radón en vaughan hogar. Se trata de un gas incoloro e inodoro que puede dañar los pulmones. Para obtener más información, visite [email or web address] y busque \"Radon in Homes\" (Radón en los hogares).  Mantenga las ming descargadas y bajo llave en un lugar seguro, augustus judith caja wilfredo o judith cámara blindada, o use un candado para ming y esconda las llaves. Guarde siempre las balas por separado. Para obtener más información, visite [employer].[place].gov y busque \"Safe Gun Storage\" (Almacenamiento seguro lavonne).  Alimentación  Consuma 5 o más porciones de frutas y verduras al día.  Pruebe el pan de too, el arroz integral y la pasta integral (en lugar de pan andino, arroz andino y pasta).  Consuma suficiente calcio y vitamina D. Revise la etiqueta de los alimentos y procure alcanzar el 100 % de la ingesta diaria recomendada (IDR).  Exámenes periódicos  Hágase un examen dental y judith limpieza cada 6 meses.  Visite a vaughan equipo de atención médica todos los años para hablar sobre lo siguiente:  Todo cambio en vaughan " morgan.  Todo medicamento que vaughan equipo de atención le haya recetado.  Atención preventiva, planificación familiar y formas de prevenir enfermedades crónicas.  Inyecciones (vacunas)   Vacunas contra el virus del papiloma humano (VPH) (hasta los 26 años), si nunca se las escudero colocado.  Vacunas contra la hepatitis B (hasta los 59 años), si nunca se las escudero colocado.  Vacuna contra la COVID-19: vacúnese cuando corresponda.  Vacuna contra la gripe: vacúnese contra la gripe todos los años.  Vacuna contra el tétanos: vacúnese contra el tétanos cada 10 años.  Vacunas contra el neumococo, la hepatitis A y el virus sincicial respiratorio (VSR): pregunte a vaughan equipo de atención si las necesita en función de vaughan riesgo.  Vacuna contra el herpes zóster (para personas de 50 años o más).  Pruebas médicas generales  Examen de detección de diabetes:  A partir de los 35 años, hágase exámenes de detección de diabetes, augustus mínimo, cada 3 años.  Si tiene menos de 35 años, pregunte a vaughan equipo de atención si debe hacerlo.  Prueba de colesterol: a los 39 años, comience a hacerse judith prueba de colesterol cada 5 años o con mayor frecuencia si se lo aconsejan.  Exploración de densidad ósea (DEXA): a los 50 años, pregunte a vaughan equipo de atención si debe hacerse esta exploración para detectar osteoporosis (fragilidad en los huesos).  Hepatitis C: hágase la prueba, augustus mínimo, judith vez en la josé antonio.  Examen de detección de aneurismas aórticos abdominales: hable con vaughan médico sobre la posibilidad de hacerse tico examen de detección si cumple alguna de las siguientes condiciones:  fumó alguna vez;  y  es hombre según vaughan sexo biológico;  y  tiene entre 65 y 75 años.  Infecciones de transmisión sexual (ITS)  Antes de los 24 años, pregunte a vaughan equipo de atención si debe hacerse exámenes de detección de ITS.  Después de los 24 años, hágase exámenes de detección de ITS si está en riesgo. Está en riesgo de contraer alguna ITS (incluido el virus de la  inmunodeficiencia adquirida [VIH]) en los siguientes casos:  Tiene relaciones sexuales con más de judith persona.  No usa preservativos siempre que tiene relaciones sexuales.  A usted o a vaughan brayan le diagnosticaron judith infección de transmisión sexual.  Si está en riesgo de contraer el VIH, consulte sobre los medicamentos de la profilaxis de preexposición (Pre-Exposure Prophylaxis, PrEP) para prevenirlo.  Hágase la prueba del VIH, augustus mínimo, judith vez en la josé antonio, tanto si está en riesgo de contraer el virus augustus si no.  Pruebas de detección de cáncer  Examen de detección de cáncer de pita uterino: si tiene pita uterino, comience a hacerse pruebas periódicas de detección de cáncer de pita uterino a los 21 años. La mayoría de las personas que se hacen exámenes periódicos de detección y obtienen resultados normales pueden dejar de hacerlo a partir de los 65 años. Hable sobre esto con vaughan proveedor.  Exploración de detección de cáncer de mama (mamografía): si alguna vez ha tenido mamas, comience a hacerse mamografías periódicas a partir de los 40 años. Se trata de judith exploración para detectar el cáncer de mama.  Examen de detección de cáncer de colon: es importante comenzar a hacerse los exámenes de detección de cáncer de colon a los 45 años.  Hágase judith colonoscopía cada 10 años (o con más frecuencia si está en riesgo) O pregunte a vaughan proveedor sobre pruebas de heces, augustus la prueba inmunoquímica fecal (Fecal Immunochemical Test, FIT) cada año o la prueba Cologuard cada 3 años.  Para obtener más información sobre las opciones de las pruebas que tiene a disposición, visite: www.fvfiles.[email or web address].  Si necesita ayuda para jose g judith decisión, visite: [email or web address].  Prueba de detección de cáncer de próstata: si tiene próstata y está entre los 55 y 69 años, pregunte a vaughan proveedor si le convendría hacerse judith prueba anual de detección de cáncer de próstata.  Examen de detección de cáncer de pulmón: si fuma o fumó y  tiene entre 50 y 80 años, pregunte a vaughan equipo de atención si los exámenes continuos de detección de cáncer de pulmón son adecuados para usted.    Solo para uso con fines informativos. [facility] documento no pretende sustituir ninguna recomendación médica. Derechos de autor   2023 [facility].   Todos los derechos reservados. Revisión clínica a cargo de [facility] Transitions Program. [employer] [id number] - REV 04/24.

## 2025-03-25 NOTE — PROGRESS NOTES
Preventive Care Visit  [facility]  ARINA Dee CNP, Internal Medicine  Mar 25, 2025      Assessment & Plan     Routine general medical examination at a health care facility    - Lipid panel reflex to direct LDL Fasting; Future  - Comprehensive metabolic panel (BMP + Alb, Alk Phos, ALT, AST, Total. Bili, TP); Future  - TSH with free T4 reflex; Future  - CBC with platelets and differential; Future  - Lipid panel reflex to direct LDL Fasting  - Comprehensive metabolic panel (BMP + Alb, Alk Phos, ALT, AST, Total. Bili, TP)  - TSH with free T4 reflex  - CBC with platelets and differential    Type 2 diabetes mellitus with obesity (H)  Her glucose when she checks it is in good range  - HEMOGLOBIN A1C; Future  - Adult Eye  Referral; Future  - Lipid panel reflex to direct LDL Fasting; Future  - Comprehensive metabolic panel (BMP + Alb, Alk Phos, ALT, AST, Total. Bili, TP); Future  - TSH with free T4 reflex; Future  - CBC with platelets and differential; Future  - Basic metabolic panel  (Ca, Cl, CO2, Creat, Gluc, K, Na, BUN); Future  - blood glucose (NO BRAND SPECIFIED) test strip; Use to test blood sugar 1 times daily or as directed. To accompany: Blood Glucose Monitor Brands: per insurance.  - thin (NO BRAND SPECIFIED) lancets; Use with lanceting device. To accompany: Blood Glucose Monitor Brands: per insurance.  - HEMOGLOBIN A1C  - Lipid panel reflex to direct LDL Fasting  - Comprehensive metabolic panel (BMP + Alb, Alk Phos, ALT, AST, Total. Bili, TP)  - TSH with free T4 reflex  - CBC with platelets and differential    Morbid obesity (H)  Discussed weight loss would be helpful for diabetes and her blood pressure    Hypothyroidism, unspecified type  Stable on current medicine  - TSH with free T4 reflex; Future  - levothyroxine (SYNTHROID/LEVOTHROID) 112 MCG tablet; Take 1 tablet (112 mcg) by mouth daily.  - TSH with free T4 reflex    Benign essential hypertension  She needs to  "restart her blood pressure medicine.  She is currently not taking it and her blood pressure is not at goal  - Lipid panel reflex to direct LDL Fasting; Future  - Comprehensive metabolic panel (BMP + Alb, Alk Phos, ALT, AST, Total. Bili, TP); Future  - TSH with free T4 reflex; Future  - CBC with platelets and differential; Future  - losartan (COZAAR) 50 MG tablet; Take 1 tablet (50 mg) by mouth daily.  - Basic metabolic panel  (Ca, Cl, CO2, Creat, Gluc, K, Na, BUN); Future  - Lipid panel reflex to direct LDL Fasting  - Comprehensive metabolic panel (BMP + Alb, Alk Phos, ALT, AST, Total. Bili, TP)  - TSH with free T4 reflex  - CBC with platelets and differential    Hypertriglyceridemia  She needs to restart her cholesterol medicine as she is diabetic  - rosuvastatin (CRESTOR) 20 MG tablet; Take 1 tablet (20 mg) by mouth daily.    Hypercholesteremia    - rosuvastatin (CRESTOR) 20 MG tablet; Take 1 tablet (20 mg) by mouth daily.    Vitamin D deficiency  Not taking supplement  - Vitamin D Deficiency; Future  - Vitamin D Deficiency    Left knee pain, unspecified chronicity  Discussed Ortho referral  - Orthopedic  Referral; Future    Encounter for screening mammogram for breast cancer    - MA Screen Bilateral w/Alfred; Future          BMI  Estimated body mass index is 43.02 kg/m  as calculated from the following:    Height as of this encounter: 1.499 m (4' 11\").    Weight as of this encounter: 96.6 kg (213 lb).   Weight management plan: Discussed healthy diet and exercise guidelines      Patient Instructions   Lab in [address]    Losartan 50 mg daily for blood pressure     Recheck blood pressure in 3-4 weeks   As well as lab     To schedule your mammogram, you may call the breast center at [phone].       Crestor 20 mg daily for cholesterol     Eye referral     Ortho referral         Patient Education  Consejos de atención preventiva  Se trata de consejos generales que solemos ofrecer para ayudar a las personas " "a mantenerse saludables. Vaughan equipo de atención puede darle consejos específicos. Hable con ellos sobre lien propias necesidades de atención preventiva.  Estilo de josé antonio  Ejercítese, augustus mínimo, 150 minutos a la semana (30 minutos al día, 5 días a la semana).  Realice actividades de fortalecimiento muscular 2 días a la semana, ya que contribuyen al control del peso y a la prevención de enfermedades.  No fume.  Use protector solar para prevenir el cáncer de piel.  Cada 2 a 5 años, realice pruebas de detección de radón en vaughan hogar. Se trata de un gas incoloro e inodoro que puede dañar los pulmones. Para obtener más información, visite www.health.[facility].mn.us y busque \"Radon in Homes\" (Radón en los hogares).  Mantenga las ming descargadas y bajo llave en un lugar seguro, augustus judith caja wilfredo o judith cámara blindada, o use un candado para ming y esconda las llaves. Guarde siempre las balas por separado. Para obtener más información, visite zuuka![place].gov y busque \"Safe Gun Storage\" (Almacenamiento seguro lavonne).  Alimentación  Consuma 5 o más porciones de frutas y verduras al día.  Pruebe el pan de too, el arroz integral y la pasta integral (en lugar de pan andino, arroz andino y pasta).  Consuma suficiente calcio y vitamina D. Revise la etiqueta de los alimentos y procure alcanzar el 100 % de la ingesta diaria recomendada (IDR).  Exámenes periódicos  Hágase un examen dental y judith limpieza cada 6 meses.  Visite a vaughan equipo de atención médica todos los años para hablar sobre lo siguiente:  Todo cambio en vaughan morgan.  Todo medicamento que vaughan equipo de atención le haya recetado.  Atención preventiva, planificación familiar y formas de prevenir enfermedades crónicas.  Inyecciones (vacunas)   Vacunas contra el virus del papiloma humano (VPH) (hasta los 26 años), si nunca se las escudero colocado.  Vacunas contra la hepatitis B (hasta los 59 años), si nunca se las escudero colocado.  Vacuna contra la COVID-19: vacúnese cuando " corresponda.  Vacuna contra la gripe: vacúnese contra la gripe todos los años.  Vacuna contra el tétanos: vacúnese contra el tétanos cada 10 años.  Vacunas contra el neumococo, la hepatitis A y el virus sincicial respiratorio (VSR): pregunte a vaughan equipo de atención si las necesita en función de vaughan riesgo.  Vacuna contra el herpes zóster (para personas de 50 años o más).  Pruebas médicas generales  Examen de detección de diabetes:  A partir de los 35 años, hágase exámenes de detección de diabetes, augustus mínimo, cada 3 años.  Si tiene menos de 35 años, pregunte a vaughan equipo de atención si debe hacerlo.  Prueba de colesterol: a los 39 años, comience a hacerse judith prueba de colesterol cada 5 años o con mayor frecuencia si se lo aconsejan.  Exploración de densidad ósea (DEXA): a los 50 años, pregunte a vaughan equipo de atención si debe hacerse esta exploración para detectar osteoporosis (fragilidad en los huesos).  Hepatitis C: hágase la prueba, augustus mínimo, judith vez en la josé antonio.  Examen de detección de aneurismas aórticos abdominales: hable con vaughan médico sobre la posibilidad de hacerse tico examen de detección si cumple alguna de las siguientes condiciones:  fumó alguna vez;  y  es hombre según vaughan sexo biológico;  y  tiene entre 65 y 75 años.  Infecciones de transmisión sexual (ITS)  Antes de los 24 años, pregunte a vaughan equipo de atención si debe hacerse exámenes de detección de ITS.  Después de los 24 años, hágase exámenes de detección de ITS si está en riesgo. Está en riesgo de contraer alguna ITS (incluido el virus de la inmunodeficiencia adquirida [VIH]) en los siguientes casos:  Tiene relaciones sexuales con más de judith persona.  No usa preservativos siempre que tiene relaciones sexuales.  A usted o a vaughan brayan le diagnosticaron judith infección de transmisión sexual.  Si está en riesgo de contraer el VIH, consulte sobre los medicamentos de la profilaxis de preexposición (Pre-Exposure Prophylaxis, PrEP) para prevenirlo.  Hágshari  la prueba del VIH, augustus mínimo, judith vez en la josé antonio, tanto si está en riesgo de contraer el virus augustus si no.  Pruebas de detección de cáncer  Examen de detección de cáncer de pita uterino: si tiene pita uterino, comience a hacerse pruebas periódicas de detección de cáncer de pita uterino a los 21 años. La mayoría de las personas que se hacen exámenes periódicos de detección y obtienen resultados normales pueden dejar de hacerlo a partir de los 65 años. Hable sobre esto con vaughan proveedor.  Exploración de detección de cáncer de mama (mamografía): si alguna vez ha tenido mamas, comience a hacerse mamografías periódicas a partir de los 40 años. Se trata de judith exploración para detectar el cáncer de mama.  Examen de detección de cáncer de colon: es importante comenzar a hacerse los exámenes de detección de cáncer de colon a los 45 años.  Hágase judith colonoscopía cada 10 años (o con más frecuencia si está en riesgo) O pregunte a vaughan proveedor sobre pruebas de heces, augustus la prueba inmunoquímica fecal (Fecal Immunochemical Test, FIT) cada año o la prueba Cologuard cada 3 años.  Para obtener más información sobre las opciones de las pruebas que tiene a disposición, visite: www.fvfiles.com/949825gd.  Si necesita ayuda para jose g judith decisión, visite: ace/[email or web address].  Prueba de detección de cáncer de próstata: si tiene próstata y está entre los 55 y 69 años, pregunte a vaughan proveedor si le convendría hacerse judith prueba anual de detección de cáncer de próstata.  Examen de detección de cáncer de pulmón: si fuma o fumó y tiene entre 50 y 80 años, pregunte a vaughan equipo de atención si los exámenes continuos de detección de cáncer de pulmón son adecuados para usted.    Solo para uso con fines informativos. Abbey documento no pretende sustituir ninguna recomendación médica. Derechos de autor   2023 [facility].   Todos los derechos reservados. Revisión clínica a cargo de [facility].  "[employer] [id number] - REV 04/24.       Subjective   Quoc Thakur is a 69 year old, presenting for the following:    Left knee pain for one month. Denies injury.  RECHECK (Mini mental: clock passed and remembers 2 words), Thyroid Problem, and Medicare Visit        3/25/2025    10:15 AM   Additional Questions   Roomed by LEVAR Vásquez LPN   Accompanied by self         3/25/2025    10:15 AM   Patient Reported Additional Medications   Patient reports taking the following new medications none          History of Present Illness       Hypothyroidism:     Since last visit, patient describes the following symptoms::  None    She eats 2-3 servings of fruits and vegetables daily.She consumes 0 sweetened beverage(s) daily.She exercises with enough effort to increase her heart rate 9 or less minutes per day.  She exercises with enough effort to increase her heart rate 3 or less days per week.   She is taking medications regularly.    Fasting     Glucose 110-20 fasting     Not taking cholesterol medication  Willing to do     Not taking blood pressure medication - willing to restart     Knee pain left   Pain behind knee and hard to bend   Sometimes feels knee is stuck   One year ago - pain during the cold and went away but this year has not gone away with better weather           Advance Care Planning  Patient does not have a Health Care Directive: Discussed advance care planning with patient; however, patient declined at this time.       No data to display                  6/1/2021   Nutrition   Diet: regular (no restrictions)         6/1/2021   Exercise   Frequency of exercise: None             No data to display                  6/1/2021   Dental   Dentist two times every year? Yes          No data to display                     No data to display                  Today's PHQ-9 Score:       3/25/2025    10:12 AM   PHQ-9 SCORE   PHQ-9 Total Score MyChart Incomplete         6/1/2021   Substance Use   Alcohol more than 3/day or more  than 7/wk No     Social History     Tobacco Use    Smoking status: Never    Smokeless tobacco: Never   Vaping Use    Vaping status: Never Used   Substance Use Topics    Alcohol use: Yes     Alcohol/week: 0.0 standard drinks of alcohol     Comment: Socially    Drug use: No           9/7/2023   LAST FHS-7 RESULTS   1st degree relative breast or ovarian cancer No   Any relative bilateral breast cancer No   Any male have breast cancer No   Any ONE woman have BOTH breast AND ovarian cancer No   Any woman with breast cancer before 50yrs No   2 or more relatives with breast AND/OR ovarian cancer No   2 or more relatives with breast AND/OR bowel cancer No              History of abnormal Pap smear:         Latest Ref Rng & Units 10/31/2018     9:25 AM 10/31/2018     9:18 AM 3/23/2016    12:00 AM   PAP / HPV   PAP (Historical)   NIL  NIL    HPV 16 DNA NEG^Negative Negative      HPV 18 DNA NEG^Negative Negative      Other HR HPV NEG^Negative Negative        ASCVD Risk   The 10-year ASCVD risk score (Bradley VALLADARES, et al., 2019) is: 26%    Values used to calculate the score:      Age: 69 years      Sex: Female      Is Non- : No      Diabetic: Yes      Tobacco smoker: No      Systolic Blood Pressure: 152 mmHg      Is BP treated: Yes      HDL Cholesterol: 47 mg/dL      Total Cholesterol: 187 mg/dL            Reviewed and updated as needed this visit by Provider                    Lab work is in process  Current providers sharing in care for this patient include:  Patient Care Team:  No Ref-Primary, Physician as PCP - General  Kelin Chauhan, RN as Specialty Care Coordinator (Hematology & Oncology)  Kaycee Conrad RP as Assigned MTM Pharmacist  Kesha Rodas APRN CNP as Assigned PCP  Agnieszka Dumont PA-C as Assigned Gastroenterology Provider  Kesha Rodas APRN CNP (Nurse Practitioner - Family)  Katalina Castillo MD as Assigned Cancer Care Provider    The following health  "maintenance items are reviewed in Epic and correct as of today:  Health Maintenance   Topic Date Due    DEXA  Never done    EYE EXAM  Never done    ZOSTER IMMUNIZATION (2 of 2) 01/29/2020    DIABETIC FOOT EXAM  06/01/2022    INFLUENZA VACCINE (1) 09/01/2024    COVID-19 Vaccine (3 - 2024-25 season) 09/01/2024    A1C  11/28/2024    ANNUAL REVIEW OF HM ORDERS  05/28/2025    BMP  05/28/2025    CMP  05/28/2025    LIPID  05/28/2025    MICROALBUMIN  05/28/2025    TSH W/FREE T4 REFLEX  05/28/2025    FALL RISK ASSESSMENT  05/28/2025    CBC W/DIFFERENTIAL  06/14/2025    MAMMO SCREENING  09/07/2025    MEDICARE ANNUAL WELLNESS VISIT  03/25/2026    ADVANCE CARE PLANNING  06/17/2029    COLORECTAL CANCER SCREENING  11/29/2030    RSV VACCINE (1 - 1-dose 75+ series) 06/11/2031    DTAP/TDAP/TD IMMUNIZATION (3 - Td or Tdap) 05/28/2034    HEPATITIS C SCREENING  Completed    PHQ-2 (once per calendar year)  Completed    Pneumococcal Vaccine: 50+ Years  Completed    HPV IMMUNIZATION  Aged Out    MENINGITIS IMMUNIZATION  Aged Out    PAP  Discontinued         Review of Systems  Constitutional, neuro, ENT, endocrine, pulmonary, cardiac, gastrointestinal, genitourinary, musculoskeletal, integument and psychiatric systems are negative, except as otherwise noted.     Objective    Exam  BP (!) 152/78   Pulse 65   Temp 96.9  F (36.1  C) (Oral)   Resp 20   Ht 1.499 m (4' 11\")   Wt 96.6 kg (213 lb)   LMP  (LMP Unknown)   Breastfeeding No   BMI 43.02 kg/m     Estimated body mass index is 43.02 kg/m  as calculated from the following:    Height as of this encounter: 1.499 m (4' 11\").    Weight as of this encounter: 96.6 kg (213 lb).    Physical Exam  GENERAL: alert and no distress  EYES: Eyes grossly normal to inspection, PERRL and conjunctivae and sclerae normal  HENT: ear canals and TM's normal, nose and mouth without ulcers or lesions  NECK: no adenopathy, no asymmetry, masses, or scars  RESP: lungs clear to auscultation - no rales, rhonchi " or wheezes  BREAST: normal without masses, tenderness or nipple discharge and no palpable axillary masses or adenopathy  CV: regular rate and rhythm, normal S1 S2, no S3 or S4, no murmur, click or rub, no peripheral edema  ABDOMEN: soft, nontender, no hepatosplenomegaly, no masses and bowel sounds normal  MS: no gross musculoskeletal defects noted, no edema  SKIN: no suspicious lesions or rashes  NEURO: Normal strength and tone, mentation intact and speech normal  PSYCH: mentation appears normal, affect normal/bright         No data to display                      Signed Electronically by: ARINA Dee CNP

## 2025-03-26 ENCOUNTER — PATIENT OUTREACH (OUTPATIENT)
Dept: CARE COORDINATION | Facility: CLINIC | Age: 70
End: 2025-03-26
Payer: COMMERCIAL

## 2025-03-26 DIAGNOSIS — E55.9 VITAMIN D DEFICIENCY: Primary | ICD-10-CM

## 2025-03-26 RX ORDER — CHOLECALCIFEROL (VITAMIN D3) 50 MCG
2 TABLET ORAL DAILY
Qty: 200 TABLET | Refills: 3 | Status: SHIPPED | OUTPATIENT
Start: 2025-03-26

## 2025-04-11 ENCOUNTER — APPOINTMENT (OUTPATIENT)
Dept: INTERPRETER SERVICES | Facility: CLINIC | Age: 70
End: 2025-04-11
Payer: COMMERCIAL

## 2025-04-15 ENCOUNTER — HOSPITAL ENCOUNTER (OUTPATIENT)
Dept: MAMMOGRAPHY | Facility: CLINIC | Age: 70
Discharge: HOME | End: 2025-04-15
Attending: NURSE PRACTITIONER | Admitting: NURSE PRACTITIONER
Payer: COMMERCIAL

## 2025-04-15 ENCOUNTER — ALLIED HEALTH/NURSE VISIT (OUTPATIENT)
Dept: INTERNAL MEDICINE | Facility: CLINIC | Age: 70
End: 2025-04-15
Payer: COMMERCIAL

## 2025-04-15 ENCOUNTER — LAB (OUTPATIENT)
Dept: LAB | Facility: CLINIC | Age: 70
End: 2025-04-15
Payer: COMMERCIAL

## 2025-04-15 VITALS — DIASTOLIC BLOOD PRESSURE: 73 MMHG | SYSTOLIC BLOOD PRESSURE: 130 MMHG

## 2025-04-15 DIAGNOSIS — E11.69 TYPE 2 DIABETES MELLITUS WITH OBESITY (H): ICD-10-CM

## 2025-04-15 DIAGNOSIS — Z12.31 ENCOUNTER FOR SCREENING MAMMOGRAM FOR BREAST CANCER: ICD-10-CM

## 2025-04-15 DIAGNOSIS — I10 HYPERTENSION: Primary | ICD-10-CM

## 2025-04-15 DIAGNOSIS — E66.9 TYPE 2 DIABETES MELLITUS WITH OBESITY (H): ICD-10-CM

## 2025-04-15 DIAGNOSIS — I10 BENIGN ESSENTIAL HYPERTENSION: ICD-10-CM

## 2025-04-15 LAB
ANION GAP SERPL CALCULATED.3IONS-SCNC: 9 MMOL/L (ref 7–15)
BUN SERPL-MCNC: 16.6 MG/DL (ref 8–23)
CALCIUM SERPL-MCNC: 9.2 MG/DL (ref 8.8–10.4)
CHLORIDE SERPL-SCNC: 106 MMOL/L (ref 98–107)
CREAT SERPL-MCNC: 0.58 MG/DL (ref 0.51–0.95)
EGFRCR SERPLBLD CKD-EPI 2021: >90 ML/MIN/1.73M2
GLUCOSE SERPL-MCNC: 150 MG/DL (ref 70–99)
HCO3 SERPL-SCNC: 25 MMOL/L (ref 22–29)
POTASSIUM SERPL-SCNC: 4.5 MMOL/L (ref 3.4–5.3)
SODIUM SERPL-SCNC: 140 MMOL/L (ref 135–145)

## 2025-04-15 PROCEDURE — 3078F DIAST BP <80 MM HG: CPT

## 2025-04-15 PROCEDURE — 36415 COLL VENOUS BLD VENIPUNCTURE: CPT

## 2025-04-15 PROCEDURE — 99207 PR NO CHARGE NURSE ONLY: CPT

## 2025-04-15 PROCEDURE — 3075F SYST BP GE 130 - 139MM HG: CPT

## 2025-04-15 PROCEDURE — 80048 BASIC METABOLIC PNL TOTAL CA: CPT

## 2025-04-15 PROCEDURE — 77063 BREAST TOMOSYNTHESIS BI: CPT

## 2025-04-15 PROCEDURE — 77067 SCR MAMMO BI INCL CAD: CPT

## 2025-06-04 ENCOUNTER — OFFICE VISIT (OUTPATIENT)
Dept: OBGYN | Facility: CLINIC | Age: 70
End: 2025-06-04
Payer: COMMERCIAL

## 2025-06-04 VITALS
BODY MASS INDEX: 42.54 KG/M2 | SYSTOLIC BLOOD PRESSURE: 158 MMHG | HEIGHT: 59 IN | WEIGHT: 211 LBS | DIASTOLIC BLOOD PRESSURE: 80 MMHG

## 2025-06-04 DIAGNOSIS — Z12.4 SCREENING FOR MALIGNANT NEOPLASM OF CERVIX: Primary | ICD-10-CM

## 2025-06-04 PROCEDURE — 3078F DIAST BP <80 MM HG: CPT | Performed by: OBSTETRICS & GYNECOLOGY

## 2025-06-04 PROCEDURE — G0476 HPV COMBO ASSAY CA SCREEN: HCPCS | Performed by: OBSTETRICS & GYNECOLOGY

## 2025-06-04 PROCEDURE — 3077F SYST BP >= 140 MM HG: CPT | Performed by: OBSTETRICS & GYNECOLOGY

## 2025-06-04 PROCEDURE — 99202 OFFICE O/P NEW SF 15 MIN: CPT | Performed by: OBSTETRICS & GYNECOLOGY

## 2025-06-04 NOTE — PROGRESS NOTES
"Chief Complaint   Patient presents with    Gyn Exam       Subjective:  69-year-old postmenopausal female presents for Pap smear.   helped facilitate today's visit in person.  She had just had a health maintenance physical in March 2025.    I had seen this patient in 2018 and elicited a history of an abnormal Pap in [place] around 2012.  Patient states that she had a follow-up Pap and everything was normal.  She does not endorse that she had any therapeutic intervention to the cervix.  She specifically states she did not have any biopsies, cryotherapy, laser or LEEP. her Pap smear in 2018 was normal with negative HPV and we will repeat that today but I informed the patient that should this be normal we can probably stop doing Pap smears moving forward.      REVIEW OF SYSTEMS:  General: as above    Health Maintenance   Topic Date Due    DEXA  Never done    EYE EXAM  Never done    RSV VACCINE (1 - Risk 60-74 years 1-dose series) Never done    ZOSTER VACCINE (2 of 2) 01/29/2020    DIABETIC FOOT EXAM  06/01/2022    COVID-19 VACCINE (3 - 2024-25 season) 09/01/2024    MICROALBUMIN  05/28/2025    FALL RISK ASSESSMENT  05/28/2025    INFLUENZA VACCINE (Season Ended) 09/01/2025    A1C  09/25/2025    MEDICARE ANNUAL WELLNESS VISIT  03/25/2026    CMP  03/25/2026    LIPID  03/25/2026    TSH W/FREE T4 REFLEX  03/25/2026    CBC W/DIFFERENTIAL  03/25/2026    BMP  04/15/2026    MAMMO SCREENING  04/15/2027    ADVANCE CARE PLANNING  03/25/2030    COLORECTAL CANCER SCREENING  11/29/2030    DTAP/TDAP/TD VACCINE (3 - Td or Tdap) 05/28/2034    HEPATITIS C SCREENING  Completed    PHQ-2 (once per calendar year)  Completed    PNEUMOCOCCAL VACCINE 50+ YEARS  Completed    HPV VACCINE  Aged Out    MENINGITIS VACCINE  Aged Out    PAP  Discontinued       No Known Allergies    Objective:  Vitals: BP (!) 158/80   Ht 1.499 m (4' 11\")   Wt 95.7 kg (211 lb)   LMP  (LMP Unknown)   BMI 42.62 kg/m    BMI= Body mass index is 42.62 " kg/m .    External genitalia without lesions.  Vagina negative throughout its course.  Cervix multiparous in appearance without lesions or bleeding.  Pap smear with endocervical brush and Cytobrush obtained.    Assessment/Plan:  1. Screening for malignant neoplasm of cervix (Primary)  Pap obtained  - HPV and Gynecologic Cytology Panel - Recommended Age 30-65 Years        Arcadio Silver MD

## 2025-06-06 ENCOUNTER — RESULTS FOLLOW-UP (OUTPATIENT)
Dept: OBGYN | Facility: CLINIC | Age: 70
End: 2025-06-06

## 2025-06-09 ENCOUNTER — TELEPHONE (OUTPATIENT)
Dept: INTERNAL MEDICINE | Facility: CLINIC | Age: 70
End: 2025-06-09
Payer: COMMERCIAL

## 2025-06-09 NOTE — TELEPHONE ENCOUNTER
Patient Quality Outreach    Patient is due for the following:   Diabetes -  Eye Exam  Hypertension -  BP check- done     Action(s) Taken:   Please send last DM eye exam info to abstraction.    Type of outreach:    Phone, left message for patient/parent to call back.    Questions for provider review:    None         Cait Vásquez LPN  Chart routed to None.

## 2025-06-11 LAB
BKR AP ASSOCIATED HPV REPORT: NORMAL
BKR LAB AP GYN ADEQUACY: NORMAL
BKR LAB AP GYN INTERPRETATION: NORMAL
BKR LAB AP GYN OTHER FINDINGS: NORMAL
BKR LAB AP PREVIOUS ABNORMAL: NORMAL
PATH REPORT.COMMENTS IMP SPEC: NORMAL
PATH REPORT.COMMENTS IMP SPEC: NORMAL
PATH REPORT.RELEVANT HX SPEC: NORMAL

## 2025-06-12 ENCOUNTER — PATIENT OUTREACH (OUTPATIENT)
Dept: OBGYN | Facility: CLINIC | Age: 70
End: 2025-06-12
Payer: COMMERCIAL

## (undated) DEVICE — ENDO FORCEP ENDOJAW BIOPSY 2.8MMX230CM FB-220U

## (undated) DEVICE — KIT ENDO TURNOVER/PROCEDURE W/CLEAN A SCOPE LINERS 103888

## (undated) RX ORDER — FENTANYL CITRATE 50 UG/ML
INJECTION, SOLUTION INTRAMUSCULAR; INTRAVENOUS
Status: DISPENSED
Start: 2023-11-29

## (undated) RX ORDER — FENTANYL CITRATE 50 UG/ML
INJECTION, SOLUTION INTRAMUSCULAR; INTRAVENOUS
Status: DISPENSED
Start: 2017-06-23